# Patient Record
Sex: FEMALE | Race: BLACK OR AFRICAN AMERICAN | Employment: FULL TIME | URBAN - METROPOLITAN AREA
[De-identification: names, ages, dates, MRNs, and addresses within clinical notes are randomized per-mention and may not be internally consistent; named-entity substitution may affect disease eponyms.]

---

## 2017-01-23 ENCOUNTER — HOSPITAL ENCOUNTER (OUTPATIENT)
Dept: LAB | Age: 27
Discharge: HOME | End: 2017-01-23

## 2017-01-23 DIAGNOSIS — C56.2 FEMALE YOLK SAC TUMOR, LEFT (HCC): ICD-10-CM

## 2017-01-23 PROCEDURE — 99001 SPECIMEN HANDLING PT-LAB: CPT | Performed by: OBSTETRICS & GYNECOLOGY

## 2017-01-26 ENCOUNTER — TELEPHONE (OUTPATIENT)
Dept: ONCOLOGY | Age: 27
End: 2017-01-26

## 2017-01-26 DIAGNOSIS — C56.2 FEMALE YOLK SAC TUMOR, LEFT (HCC): Primary | ICD-10-CM

## 2017-02-09 ENCOUNTER — HOSPITAL ENCOUNTER (OUTPATIENT)
Dept: LAB | Age: 27
Discharge: HOME | End: 2017-02-09

## 2017-02-09 ENCOUNTER — TELEPHONE (OUTPATIENT)
Dept: ONCOLOGY | Age: 27
End: 2017-02-09

## 2017-02-09 DIAGNOSIS — C56.2 FEMALE YOLK SAC TUMOR, LEFT (HCC): ICD-10-CM

## 2017-02-09 PROCEDURE — 99001 SPECIMEN HANDLING PT-LAB: CPT | Performed by: NURSE PRACTITIONER

## 2017-02-09 NOTE — TELEPHONE ENCOUNTER
Pt agreeable to having labs redrawn. Orders in [address] [place] [address]. Will f/u via phone to review results.

## 2017-02-10 LAB
AFP-TM SERPL-MCNC: 4.2 NG/ML (ref 0–8.3)
CANCER AG125 SERPL-ACNC: 6.8 U/ML (ref 0–38.1)

## 2017-02-23 ENCOUNTER — TELEPHONE (OUTPATIENT)
Dept: ONCOLOGY | Age: 27
End: 2017-02-23

## 2017-04-25 ENCOUNTER — TELEPHONE (OUTPATIENT)
Dept: ONCOLOGY | Age: 27
End: 2017-04-25

## 2017-04-25 ENCOUNTER — HOSPITAL ENCOUNTER (OUTPATIENT)
Dept: LAB | Age: 27
Discharge: HOME | End: 2017-04-25

## 2017-04-25 DIAGNOSIS — C56.2 OVARIAN CANCER ON LEFT (HCC): ICD-10-CM

## 2017-04-25 DIAGNOSIS — C56.2 FEMALE YOLK SAC TUMOR, LEFT (HCC): ICD-10-CM

## 2017-04-25 DIAGNOSIS — C56.2 MALIGNANT GERM CELL TUMOR OF LEFT OVARY (HCC): Primary | ICD-10-CM

## 2017-04-25 DIAGNOSIS — C56.2: ICD-10-CM

## 2017-04-25 PROCEDURE — 99001 SPECIMEN HANDLING PT-LAB: CPT | Performed by: OBSTETRICS & GYNECOLOGY

## 2017-04-26 LAB
AFP-TM SERPL-MCNC: 3.8 NG/ML (ref 0–8.3)
CANCER AG125 SERPL-ACNC: 7.1 U/ML (ref 0–38.1)

## 2017-04-28 ENCOUNTER — OFFICE VISIT (OUTPATIENT)
Dept: ONCOLOGY | Age: 27
End: 2017-04-28

## 2017-04-28 VITALS
WEIGHT: 215 LBS | BODY MASS INDEX: 39.32 KG/M2 | OXYGEN SATURATION: 100 % | SYSTOLIC BLOOD PRESSURE: 124 MMHG | DIASTOLIC BLOOD PRESSURE: 70 MMHG | RESPIRATION RATE: 16 BRPM | HEART RATE: 82 BPM | TEMPERATURE: 97.9 F

## 2017-04-28 DIAGNOSIS — T45.1X5A NEUROPATHY DUE TO CHEMOTHERAPEUTIC DRUG (HCC): ICD-10-CM

## 2017-04-28 DIAGNOSIS — C56.2 OVARIAN CANCER ON LEFT (HCC): Primary | ICD-10-CM

## 2017-04-28 DIAGNOSIS — G62.0 NEUROPATHY DUE TO CHEMOTHERAPEUTIC DRUG (HCC): ICD-10-CM

## 2017-04-28 DIAGNOSIS — C56.2 MALIGNANT GERM CELL TUMOR OF LEFT OVARY (HCC): ICD-10-CM

## 2017-04-28 DIAGNOSIS — N95.1 MENOPAUSAL SYMPTOMS: ICD-10-CM

## 2017-04-28 DIAGNOSIS — C56.2: ICD-10-CM

## 2017-04-28 DIAGNOSIS — C56.2 FEMALE YOLK SAC TUMOR, LEFT (HCC): ICD-10-CM

## 2017-04-28 RX ORDER — GABAPENTIN 300 MG/1
300 CAPSULE ORAL 3 TIMES DAILY
COMMUNITY

## 2017-04-28 NOTE — PROGRESS NOTES
Pt here ambulatory for follow up with Dr Marycarmen Arellano. Completed chemo 12-19-17. Pt denies any vaginal discharge. Pt does have residual neuropathy in her hands and feet \"itching and tingling off and on\". Pt is taking neurontin \"it says 3 times a day, but I take it only once a day\". Advise her to discuss with her PCP. Also advised to take the B-6 and B12 vitamins. PT agreed to this. Wrote down dosage for these. Education provided and pt to follow up as scheduled.

## 2017-04-28 NOTE — PROGRESS NOTES
[facility]  [facility], [address], [address] [id number] [phone]  Yasmany Dumas MD    Patient ID:  Drake Polo  [id number]   y.o. 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 Diagnosed initially 10/24/2013 with a recurrence in 2014. Pt completed Bleomycin/Etoposide/Cisplatin for recurrence 14. Her previous procedures include Oophorectomy, right, and D&C 10/24/2014. Patient is also status post Oophorectomy, left, Diagnostic Lap, Exploratory Lap, Resection of Right Pelvic Mass, Lysis of adhesions and Cystoscopy 2014. She is here today for a 3 month f/u. Patient denies any Gyn symptoms. Patient specifically denies any abnormal vaginal bleeding, vaginal discharge, or vaginal irritation. Patient also denies abdominal pain, pelvic pain, or abdominal bloating. Patient is voiding without difficulty and bowel movements are regular. Patient reports residual neuropathy as detailed in [facility] note and reviewed by me. US TRANSVAGINAL: 5/15/15    IMPRESSION:  The uterus is within normal limits.  Both ovaries have been removed    Labs:  Component       AFP, Serum, Tumor Marker   Latest Ref Rng & Units       0.0 - 8.3 ng/mL   2017      12:00 AM 3.8   2017      12:00 AM 4.2   10/11/2016      7:35 AM 4.6   2016      8:30 AM 3.9   6/3/2016      7:55 AM 3.9   2016      7:44 AM 4.1   2016      9:00 AM 3.6   3/15/2016      8:07 AM 4.2   2016      9:58 AM 3.2   2016      8:09 AM 4.1   2015      10:23 AM 5.6   2015      8:25 AM 4.7   10/12/2015      11:42 AM 4.0   2015      12:00 AM 4.3   2015      9:06 AM 4.7   2015      2:03 PM 4.0   2015      10:50 AM 4.2   2015      10:09 AM 4.1   2015      4:22 PM 4.2   3/5/2015      12:00 AM 4.1   2015      12:00 AM 6.0   1/5/2015      11:15 AM 6.3     12/4/2014      12:10 PM 8.8 (H)   10/31/2014      11:30 AM 54.6 (H)   10/10/2014      9:55 .5 (H)   9/8/2014      12:00 AM 95302.0 (H)   5/29/2014      12:00 AM 6693.0 (H)     Component       Cancer Ag (CA) 125   Latest Ref Rng & Units       0.0 - 38.1 U/mL   2/9/2017      12:00 AM 6.8   10/11/2016       7:35 AM 8.1   7/6/2016       8:30 AM 9.3   6/3/2016       7:55 AM 8.4   5/11/2016       7:44 AM 7.6   4/14/2016       9:00 AM 8.1   3/15/2016       8:07 AM 8.1   2/17/2016       9:58 AM 6.5   1/8/2016       8:09 AM 7.6   12/2/2015      10:23 AM 9.0   11/12/2015       8:25 AM 9.8   10/12/2015      11:42 AM 9.0   9/18/2015      12:00 AM 8.2   8/4/2015       9:06 AM 9.1   7/7/2015       2:03 PM 10.2   6/5/2015      10:50 AM 8.7   5/12/2015      10:09 AM 8.8   4/6/2015       4:22 PM 8.3   3/5/2015      12:00 AM 8.1   1/5/2015      11:15 AM 12.8   12/12/2014      10:15 AM 13.5   11/21/2014       8:40 AM 14.2   10/31/2014      11:30 AM 11.2   9/26/2014       1:30 PM 33.6   5/29/2014      12:00 AM 23.8     Ca 125 11/4/2013: = 82.7       COMPREHENSIVE ROS:   Scanned in media and reviewed by me. Detailed positives and pertinent negatives in HPI      Past Medical History:   Diagnosis Date    Asthma     well controlled    Cancer ([address]     left ovary h/o    Coagulation disorder ([address]     Dyspepsia and other specified disorders of function of stomach     Hypokalemia 9/8/2014    Morbid obesity ([address]     [facility]     PIH (pregnancy induced hypertension)        Past Surgical History:   Procedure Laterality Date    HX ADENOIDECTOMY Bilateral 10/2005    HX OOPHORECTOMY      left    HX POLYPECTOMY  1993    rectal       Social History     Social History    Marital status:      Spouse name: N/A    Number of children: N/A    Years of education: N/A     Occupational History    Not on file.      Social History Main Topics    Smoking status: Never Smoker    Smokeless tobacco: Never Used    Alcohol use 0.0 oz/week      Comment: 1-2 mixed drinks monthly    Drug use: No    Sexual activity: Yes     Partners: Male     Other Topics Concern    Not on file     Social History Narrative       Family History   Problem Relation Age of Onset    Cancer Father     Cancer Paternal Uncle        Current Outpatient Prescriptions on File Prior to Visit   Medication Sig Dispense Refill    norethindrone-ethinyl estradiol-iron (WANDA FE 1.5/30, 28,) 1.5 mg-30 mcg (21)/75 mg (7) tab Take 1 Tab by mouth daily. Do not take last 7 pills of each skip. Discard these and start new pack. 112 Tab 3    loratadine (CLARITIN) 10 mg tablet Take 10 mg by mouth daily.  albuterol (PROAIR HFA) 90 mcg/actuation inhaler Take  by inhalation.  LORazepam (ATIVAN) 0.5 mg tablet Take 1 Tab by mouth every twelve (12) hours as needed for Anxiety. Max Daily Amount: 1 mg. 60 Tab 0    ibuprofen (MOTRIN) 800 mg tablet Take 1 tablet by mouth every six (6) hours as needed for Pain. 90 tablet 1     No current facility-administered medications on file prior to visit.         Allergies   Allergen Reactions    Penicillins Hives       OBJECTIVE:  PHYSICAL EXAM  VITAL SIGNS: Visit Vitals    /70 (BP 1 Location: Left arm, BP Patient Position: Sitting)    Pulse 82    Temp 97.9 °F (36.6 °C) (Oral)    Resp 16    Wt 97.5 kg (215 lb)    SpO2 100%    BMI 39.32 kg/m2      GENERAL ANDREW: Well nourished, well developed, no acute distress   HEENT: NCAT, EOMI, PERRL    RESPIRATORY: Lungs CTA bilaterally, no wheezing, rhonchi, or crackles    CARDIOVASC: RRR, S1 and S2 present, no murmurs, rubs, or gallop    GASTROINT: Soft, nontender, nondistended, NABS, no masses    MUSCULOSKEL: no joint tenderness, deformity or swelling    INTEGUMENT: no rash or abnormalities    EXTREMITIES: extremities normal, atraumatic, no cyanosis or edema    PELVIC: VULVA: normal appearing vulva with no masses, tenderness or lesions  VAGINA: normal appearing vagina with hyperemia and white clumpy discharge   CERVIX: normal appearing cervix, thin prep pap collected today UTERUS: uterus is normal size, shape, consistency and nontender  ADNEXA: surgically absent bilateral.   RECTAL: deferred   KATIA SURVEY: Cervical, supraclavicular, and axillary nodes normal   NEURO: Grossly normal       IMPRESSION AND PLAN:    Recurrent Yolk Sac Tumor of the Ovary   Completed chemotherapy with BEP x 4 cycles with complete clinical response  Neuropathy, chemo induced, improved. Encouraged continued B6 and B12 use. Encourage patient to take Neurontin as prescribed or discuss with PCP. GERD, improved, continue Protonix  Microgestin for menopausal symptoms. Taking continuous. Instructed patient to discard pill pack after completed first 21 pills and start new pack. Reinforced taking pill at the same time daily to avoid breakthrough bleeding. AFP and CA-125 ordered every 3 months. Follow up for pelvic exam for surveillance in 6 months. Will be due for pap 7/2017.   Patient verbalizes understanding of information provided today and agrees with plan of care      Jj Doan MD  Gynecologic Oncology  4/28/2017/10:30 AM

## 2017-04-28 NOTE — MR AVS SNAPSHOT
Visit Information Date & Time Provider Department Dept. Phone Encounter #  
 4/28/2017 10:30 AM MD Edy Manz[facility] [phone] [id number] Your Appointments 10/30/2017  9:45 AM  
Office Visit with MD Edy Manzano [facility] [address] [facility]) Appt Note: 6 MO F/U  
 [id number] [address] Upcoming Health Maintenance Date Due Pneumococcal 19-64 Highest Risk (1 of 3 - PCV13) 9/7/2008 DTaP/Tdap/Td series (1 - Tdap) 9/7/2010 INFLUENZA AGE 9 TO ADULT 8/1/2016 PAP AKA CERVICAL CYTOLOGY 7/19/2019 Allergies as of 4/28/2017  Review Complete On: 4/28/2017 By: [address], MD  
  
 Severity Noted Reaction Type Reactions Penicillins  10/23/2013   Topical Hives Current Immunizations  Reviewed on 12/29/2014 No immunizations on file. Not reviewed this visit You Were Diagnosed With   
  
 Codes Comments Ovarian cancer on left 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 
ICD-9-CM: 357.6, E933.1 Vitals BP Pulse Temp Resp Weight(growth percentile) SpO2  
 124/70 (BP 1 Location: Left arm, BP Patient Position: Sitting) 82 97.9 °F (36.6 °C) (Oral) 16 215 lb (97.5 kg) 100% BMI OB Status Smoking Status 39.32 kg/m2 Having regular periods Never Smoker BMI and BSA Data  Body Mass Index Body Surface Area  
 39.32 kg/m 2 2.07 m 2  
  
  
 Preferred Pharmacy Pharmacy Name Phone [id number]on [phone] Your Updated Medication List  
  
   
This list is accurate as of: 4/28/17 11:46 AM.  Always use your most recent med list.  
  
  
  
  
 CLARITIN 10 mg tablet Generic drug:  loratadine Take 10 mg by mouth daily. gabapentin 300 mg capsule Commonly known as:  NEURONTIN Take 300 mg by mouth daily. ibuprofen 800 mg tablet Commonly known as:  MOTRIN Take 1 tablet by mouth every six (6) hours as needed for Pain. LORazepam 0.5 mg tablet Commonly known as:  ATIVAN Take 1 Tab by mouth every twelve (12) hours as needed for Anxiety. Max Daily Amount: 1 mg. norethindrone-ethinyl estradiol-iron 1.5 mg-30 mcg (21)/75 mg (7) Tab Commonly known as:  WANDA FE 1.5/30 (28) Take 1 Tab by mouth daily. Do not take last 7 pills of each skip. Discard these and start new pack. PROAIR HFA 90 mcg/actuation inhaler Generic drug:  albuterol Take  by inhalation. Introducing [facility]! Dear Wes Phillips: Thank you for requesting a [employer] account. Our records indicate that you already have an active [employer] account. You can access your account anytime at [email or web address]. [employer] Did you know that you can access your hospital and ER discharge instructions at any time in [employer]? You can also review all of your test results from your hospital stay or ER visit. Additional Information If you have questions, please visit the Frequently Asked Questions section of the [employer] website at [email or web address]. [employer]/. Remember, [employer] is NOT to be used for urgent needs. For medical emergencies, dial 911. Now available from your iPhone and Android! Please provide this summary of care documentation to your next provider. Your primary care clinician is listed as Nathalie Arndt.  If you have any questions after today's visit, please call [phone].

## 2017-07-11 ENCOUNTER — HOSPITAL ENCOUNTER (OUTPATIENT)
Dept: LAB | Age: 27
Discharge: HOME | End: 2017-07-11

## 2017-07-11 PROCEDURE — 99001 SPECIMEN HANDLING PT-LAB: CPT | Performed by: OBSTETRICS & GYNECOLOGY

## 2017-07-12 LAB
AFP-TM SERPL-MCNC: 5 NG/ML (ref 0–8.3)
CANCER AG125 SERPL-ACNC: 6.8 U/ML (ref 0–38.1)

## 2017-07-25 ENCOUNTER — TELEPHONE (OUTPATIENT)
Dept: ONCOLOGY | Age: 27
End: 2017-07-25

## 2017-07-25 RX ORDER — FLUCONAZOLE 150 MG/1
150 TABLET ORAL DAILY
Qty: 1 TAB | Refills: 0 | Status: SHIPPED | OUTPATIENT
Start: 2017-07-25 | End: 2017-07-26

## 2017-07-27 DIAGNOSIS — C56.2 MALIGNANT GERM CELL TUMOR OF LEFT OVARY (HCC): ICD-10-CM

## 2017-07-27 DIAGNOSIS — C56.2: ICD-10-CM

## 2017-07-27 DIAGNOSIS — C56.2 OVARIAN CANCER ON LEFT (HCC): ICD-10-CM

## 2017-07-27 DIAGNOSIS — C56.2 FEMALE YOLK SAC TUMOR, LEFT (HCC): ICD-10-CM

## 2017-10-18 ENCOUNTER — TELEPHONE (OUTPATIENT)
Dept: ONCOLOGY | Age: 27
End: 2017-10-18

## 2017-10-26 ENCOUNTER — TELEPHONE (OUTPATIENT)
Dept: ONCOLOGY | Age: 27
End: 2017-10-26

## 2017-10-26 ENCOUNTER — HOSPITAL ENCOUNTER (OUTPATIENT)
Dept: LAB | Age: 27
Discharge: HOME | End: 2017-10-26

## 2017-10-26 PROCEDURE — 99001 SPECIMEN HANDLING PT-LAB: CPT | Performed by: OBSTETRICS & GYNECOLOGY

## 2017-10-26 RX ORDER — NORETHINDRONE ACETATE AND ETHINYL ESTRADIOL 1.5-30(21)
1 KIT ORAL DAILY
Qty: 60 TAB | Refills: 0 | Status: SHIPPED | OUTPATIENT
Start: 2017-10-26 | End: 2017-12-17 | Stop reason: SDUPTHER

## 2017-10-26 NOTE — TELEPHONE ENCOUNTER
Patient returned my call to reschedule her appointment since Dr. Yanique Silva is no longer with the practice. While rescheduling the patient mentioned that she is almost out of birth control and is requesting a refill, she will run out prior to her appointment. Please call her at [phone].

## 2017-10-27 LAB
AFP-TM SERPL-MCNC: 4.3 NG/ML (ref 0–8.3)
CANCER AG125 SERPL-ACNC: 7 U/ML (ref 0–38.1)

## 2017-11-07 ENCOUNTER — OFFICE VISIT (OUTPATIENT)
Dept: ONCOLOGY | Age: 27
End: 2017-11-07

## 2017-11-07 VITALS
WEIGHT: 219 LBS | OXYGEN SATURATION: 100 % | HEIGHT: 62 IN | BODY MASS INDEX: 40.3 KG/M2 | TEMPERATURE: 98.7 F | HEART RATE: 82 BPM | SYSTOLIC BLOOD PRESSURE: 131 MMHG | DIASTOLIC BLOOD PRESSURE: 99 MMHG

## 2017-11-07 DIAGNOSIS — Z12.4 SCREENING FOR MALIGNANT NEOPLASM OF CERVIX: ICD-10-CM

## 2017-11-07 DIAGNOSIS — C56.2 MALIGNANT GERM CELL TUMOR OF LEFT OVARY (HCC): Primary | ICD-10-CM

## 2017-11-07 DIAGNOSIS — C56.9 MALIGNANT NEOPLASM OF OVARY, UNSPECIFIED LATERALITY (HCC): ICD-10-CM

## 2017-11-07 DIAGNOSIS — C56.2: ICD-10-CM

## 2017-11-07 DIAGNOSIS — T45.1X5A NEUROPATHY DUE TO CHEMOTHERAPEUTIC DRUG (HCC): ICD-10-CM

## 2017-11-07 DIAGNOSIS — C56.2 OVARIAN CANCER ON LEFT (HCC): ICD-10-CM

## 2017-11-07 DIAGNOSIS — G62.0 NEUROPATHY DUE TO CHEMOTHERAPEUTIC DRUG (HCC): ICD-10-CM

## 2017-11-07 DIAGNOSIS — N95.1 MENOPAUSAL SYMPTOMS: ICD-10-CM

## 2017-11-07 RX ORDER — GENTAMICIN SULFATE 3 MG/ML
1 SOLUTION/ DROPS OPHTHALMIC
COMMUNITY
Start: 2017-11-02 | End: 2017-11-12

## 2017-11-07 RX ORDER — CITALOPRAM 20 MG/1
20 TABLET, FILM COATED ORAL
COMMUNITY
Start: 2017-10-19 | End: 2017-11-18

## 2017-11-07 RX ORDER — PHENTERMINE HYDROCHLORIDE 37.5 MG/1
37.5 TABLET ORAL
COMMUNITY
Start: 2017-07-25 | End: 2020-09-10 | Stop reason: SDUPTHER

## 2017-11-07 RX ORDER — FLUTICASONE PROPIONATE 50 MCG
2 SPRAY, SUSPENSION (ML) NASAL
COMMUNITY
Start: 2017-07-25 | End: 2018-07-25

## 2017-11-07 NOTE — PROGRESS NOTES
[facility]  [address] [place] [address], [address], [address] [phone]  [facility]    Patient ID:  Negro Franz  [id number]   y.o. 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 Diagnosed initially 10/24/2013 with a recurrence in 2014. Pt completed Bleomycin/Etoposide/Cisplatin for recurrence 14. Her previous procedures include Oophorectomy, right, and D&C 10/24/2014. Patient is also status post Oophorectomy, left, Diagnostic Lap, Exploratory Lap, Resection of Right Pelvic Mass, Lysis of adhesions and Cystoscopy 2014. She is here today for a 6 month f/u. Patient denies any Gyn symptoms. Patient specifically denies any abnormal vaginal bleeding, vaginal discharge, or vaginal irritation. Patient also denies abdominal pain, pelvic pain, or abdominal bloating. Patient is voiding without difficulty and bowel movements are regular. Patient reports residual neuropathy, taking Neurontin TID. Taking OCP continuously with relief of menopausal symptoms, denies ACHES. US TRANSVAGINAL: 5/15/15    IMPRESSION:  The uterus is within normal limits.  Both ovaries have been removed    Labs:  Component       Cancer Ag (CA) 125   Latest Ref Rng & Units       0.0 - 38.1 U/mL   10/26/2017      9:45 AM 7.0   2017      12:00 AM 6.8   2017      12:00 AM 6.8   10/11/2016      7:35 AM 8.1   2016      8:30 AM 9.3   6/3/2016      7:55 AM 8.4   2016      7:44 AM 7.6   2016      9:00 AM 8.1   3/15/2016      8:07 AM 8.1   2016      9:58 AM 6.5   2016      8:09 AM 7.6   2015      10:23 AM 9.0   2015      8:25 AM 9.8   10/12/2015      11:42 AM 9.0   2015      12:00 AM 8.2   2015      9:06 AM 9.1   2015      2:03 PM 10.2   2015      10:50 AM 8.7   2015      10:09 AM 8.8 4/6/2015      4:22 PM 8.3   3/5/2015      12:00 AM 8.1   1/5/2015      11:15 AM 12.8   12/12/2014      10:15 AM 13.5   11/21/2014      8:40 AM 14.2   10/31/2014      11:30 AM 11.2   9/26/2014      1:30 PM 33.6   5/7/2014      12:00 AM 11.5   1/20/2014      12:00 AM 12.6   12/30/2013      10:15 AM 11.8   11/25/2013      12:00 AM 46.0 (H)       Component       AFP, Serum, Tumor Marker   Latest Ref Rng & Units       0.0 - 8.3 ng/mL   10/26/2017       9:45 AM 4.3   7/11/2017      12:00 AM 5.0   4/25/2017      12:00 AM 3.8   2/9/2017      12:00 AM 4.2   10/11/2016       7:35 AM 4.6   7/6/2016       8:30 AM 3.9   6/3/2016       7:55 AM 3.9   5/11/2016       7:44 AM 4.1   4/14/2016       9:00 AM 3.6   3/15/2016       8:07 AM 4.2   2/17/2016       9:58 AM 3.2   1/8/2016       8:09 AM 4.1   12/2/2015      10:23 AM 5.6   11/12/2015       8:25 AM 4.7   10/12/2015      11:42 AM 4.0   9/18/2015      12:00 AM 4.3   8/4/2015       9:06 AM 4.7   7/7/2015       2:03 PM 4.0   6/5/2015      10:50 AM 4.2   5/12/2015      10:09 AM 4.1   4/6/2015       4:22 PM 4.2   3/5/2015      12:00 AM 4.1   1/23/2015      12:00 AM 6.0   1/5/2015      11:15 AM 6.3   12/4/2014      12:10 PM 8.8 (H)   10/31/2014      11:30 AM 54.6 (H)   10/10/2014       9:55 .5 (H)   9/8/2014      12:00 AM 74344.0 (H)         COMPREHENSIVE ROS:   Detailed positives and pertinent negatives in HPI      Past Medical History:   Diagnosis Date    Asthma     well controlled    Cancer [facility]     left ovary h/o    Coagulation disorder ([facility]     Dyspepsia and other specified disorders of function of stomach     Hypokalemia 9/8/2014    Morbid obesity ([facility]     Ovarian cancer ([facility]     PIH (pregnancy induced hypertension)     Pink eye disease, left        Past Surgical History:   Procedure Laterality Date    HX ADENOIDECTOMY Bilateral 10/2005    HX OOPHORECTOMY      left    HX POLYPECTOMY  1993    rectal       Social History     Social History    Marital status:      Spouse name: N/A    Number of children: N/A    Years of education: N/A     Occupational History    Not on file. Social History Main Topics    Smoking status: Never Smoker    Smokeless tobacco: Never Used    Alcohol use 0.0 oz/week      Comment: 1-2 mixed drinks monthly    Drug use: No    Sexual activity: Yes     Partners: Male     Birth control/ protection: Pill     Other Topics Concern    Not on file     Social History Narrative       Family History   Problem Relation Age of Onset    Cancer Father     Cancer Paternal Uncle        Current Outpatient Prescriptions on File Prior to Visit   Medication Sig Dispense Refill    norethindrone-ethinyl estradiol-iron (WANDA FE 1.5/30, 28,) 1.5 mg-30 mcg (21)/75 mg (7) tab Take 1 Tab by mouth daily. Do not take last 7 pills of each skip. Discard these and start new pack. 60 Tab 0    gabapentin (NEURONTIN) 300 mg capsule Take 300 mg by mouth daily.  loratadine (CLARITIN) 10 mg tablet Take 10 mg by mouth daily.  albuterol (PROAIR HFA) 90 mcg/actuation inhaler Take  by inhalation.  LORazepam (ATIVAN) 0.5 mg tablet Take 1 Tab by mouth every twelve (12) hours as needed for Anxiety. Max Daily Amount: 1 mg. 60 Tab 0    ibuprofen (MOTRIN) 800 mg tablet Take 1 tablet by mouth every six (6) hours as needed for Pain. 90 tablet 1     No current facility-administered medications on file prior to visit.         Allergies   Allergen Reactions    Penicillins Hives       OBJECTIVE:  PHYSICAL EXAM  VITAL SIGNS: Visit Vitals    BP (!) 131/99 (BP 1 Location: Right arm, BP Patient Position: Sitting)    Pulse 82    Temp 98.7 °F (37.1 °C) (Oral)    Ht 5' 2\" (1.575 m)    Wt 99.3 kg (219 lb)    LMP 10/16/2017 (Within Weeks)    SpO2 100%    BMI 40.06 kg/m2      GENERAL ANDREW: Well nourished, well developed, no acute distress   HEENT: NCAT, EOMI, PERRL    RESPIRATORY: Lungs CTA bilaterally, no wheezing, rhonchi, or crackles    CARDIOVASC: RRR, S1 and S2 present, no murmurs, rubs, or gallop    GASTROINT: Soft, nontender, nondistended, NABS, no masses    MUSCULOSKEL: no joint tenderness, deformity or swelling    INTEGUMENT: no rash or abnormalities    EXTREMITIES: extremities normal, atraumatic, no cyanosis or edema    PELVIC: VULVA: normal appearing vulva with no masses, tenderness or lesions  VAGINA: normal appearing vagina, atrophic  CERVIX: normal appearing cervix, thin prep pap collected today UTERUS: uterus is normal size, shape, consistency and nontender  ADNEXA: surgically absent bilateral.   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 Neuropathy, chemo induced, improved. Encouraged B6 and B12 use. Encourage patient to take Neurontin as prescribed or discuss with PCP. GERD, improved, continue Protonix  Microgestin for menopausal symptoms. Taking continuous. Instructed patient to discard pill pack after completed first 21 pills and start new pack. Reinforced taking pill at the same time daily to avoid breakthrough bleeding. AFP and CA-125 ordered every 3 months. Follow up for surveillance in 6 months. Patient verbalizes understanding of information provided today and agrees with plan of care    Total time spent was 25 minutes regarding diagnosis of ovarian cancer and >50% of this time was spent counseling and coordinating care.     [address] [place] [address]  Gynecologic Oncology  11/7/2017/10:30 AM

## 2017-11-07 NOTE — PROGRESS NOTES
Tyshawn July, a 28 y.o. female,  is here for   Chief Complaint   Patient presents with    Ovarian Cancer     surveillance       Visit Vitals    BP (!) 131/99 (BP 1 Location: Right arm, BP Patient Position: Sitting)    Pulse 82    Temp 98.7 °F (37.1 °C) (Oral)    Ht 5' 2\" (1.575 m)    Wt 99.3 kg (219 lb)    SpO2 100%    BMI 40.06 kg/m2       Patient denies any abdominal or pelvic pain. No unusual bleeding, discharge, or irritation. No changes in bladder or bowel habits. 1. Have you been to the ER, urgent care clinic since your last visit? Hospitalized since your last visit? Yes When: November 1, 2017 Where: [facility] Reason for visit:  pink eye    2. Have you seen or consulted any other health care providers outside of the [address] [place] [address] since your last visit? Include any pap smears or colon screening.  No

## 2017-11-13 LAB
CYTOLOGIST CVX/VAG CYTO: NORMAL
CYTOLOGY CVX/VAG DOC THIN PREP: NORMAL
DX ICD CODE: NORMAL
HPV I/H RISK 1 DNA CVX QL PROBE+SIG AMP: NEGATIVE
Lab: NORMAL
OTHER STN SPEC: NORMAL
PATH REPORT.FINAL DX SPEC: NORMAL
STAT OF ADQ CVX/VAG CYTO-IMP: NORMAL

## 2017-12-18 RX ORDER — FLUCONAZOLE 150 MG/1
150 TABLET ORAL DAILY
Qty: 1 TAB | Refills: 0 | Status: SHIPPED | OUTPATIENT
Start: 2017-12-18 | End: 2017-12-19

## 2017-12-18 RX ORDER — NORETHINDRONE ACETATE/ETHINYL ESTRADIOL AND FERROUS FUMARATE 1.5-30(21)
KIT ORAL
Qty: 56 TAB | Refills: 0 | Status: SHIPPED | OUTPATIENT
Start: 2017-12-18 | End: 2018-01-29 | Stop reason: SDUPTHER

## 2017-12-18 NOTE — TELEPHONE ENCOUNTER
Patient called requesting birth control refill to be put in today stating she needs to take her next pill today. If you have any questions please call her at [phone].

## 2018-01-26 DIAGNOSIS — C56.2 OVARIAN CANCER ON LEFT (HCC): ICD-10-CM

## 2018-01-26 DIAGNOSIS — C56.2 MALIGNANT GERM CELL TUMOR OF LEFT OVARY (HCC): ICD-10-CM

## 2018-01-26 DIAGNOSIS — C56.9 MALIGNANT NEOPLASM OF OVARY, UNSPECIFIED LATERALITY (HCC): ICD-10-CM

## 2018-01-26 DIAGNOSIS — C56.2: ICD-10-CM

## 2018-01-30 RX ORDER — NORETHINDRONE ACETATE/ETHINYL ESTRADIOL AND FERROUS FUMARATE 1.5-30(21)
KIT ORAL
Qty: 56 TAB | Refills: 0 | Status: SHIPPED | OUTPATIENT
Start: 2018-01-30 | End: 2018-04-16 | Stop reason: SDUPTHER

## 2018-02-12 ENCOUNTER — TELEPHONE (OUTPATIENT)
Dept: ONCOLOGY | Age: 28
End: 2018-02-12

## 2018-02-12 NOTE — TELEPHONE ENCOUNTER
Called to inform patient that her AFP and CA-125 are due to be completed. Patient expressed understanding and had no further questions or concerns. Encouraged to call back if she develops any.

## 2018-02-27 ENCOUNTER — HOSPITAL ENCOUNTER (OUTPATIENT)
Dept: LAB | Age: 28
Discharge: HOME | End: 2018-02-27

## 2018-02-27 PROCEDURE — 99001 SPECIMEN HANDLING PT-LAB: CPT | Performed by: NURSE PRACTITIONER

## 2018-03-01 ENCOUNTER — TELEPHONE (OUTPATIENT)
Dept: ONCOLOGY | Age: 28
End: 2018-03-01

## 2018-03-01 LAB
AFP-TM SERPL-MCNC: 4.3 NG/ML (ref 0–8.3)
CANCER AG125 SERPL-ACNC: 6.2 U/ML (ref 0–38.1)

## 2018-03-01 NOTE — TELEPHONE ENCOUNTER
LMOR nl labs, pt to call for specifics if desired, otherwise f/u as scheduled in May for routine surveillance.

## 2018-04-16 ENCOUNTER — TELEPHONE (OUTPATIENT)
Dept: ONCOLOGY | Age: 28
End: 2018-04-16

## 2018-04-16 RX ORDER — NORETHINDRONE ACETATE AND ETHINYL ESTRADIOL 1.5-30(21)
KIT ORAL
Qty: 56 TAB | Refills: 3 | Status: SHIPPED | OUTPATIENT
Start: 2018-04-16 | End: 2018-10-01 | Stop reason: SDUPTHER

## 2018-04-16 NOTE — TELEPHONE ENCOUNTER
Ms. Gela Carranza called the office requesting a birth control refill. Please call her at [phone] for any clarification.

## 2018-04-17 ENCOUNTER — TELEPHONE (OUTPATIENT)
Dept: ONCOLOGY | Age: 28
End: 2018-04-17

## 2018-04-17 RX ORDER — FLUCONAZOLE 150 MG/1
150 TABLET ORAL DAILY
Qty: 1 TAB | Refills: 0 | Status: SHIPPED | OUTPATIENT
Start: 2018-04-17 | End: 2018-04-18

## 2018-04-17 NOTE — TELEPHONE ENCOUNTER
Returned call to patient, will prescribe Diflucan one dose, if sx's persist will have patient f/u in office to further evaluate. Otherwise pt to f/u in May for routine surveillance. Pt agreeable.

## 2018-04-17 NOTE — TELEPHONE ENCOUNTER
Patient called and requested that yeast infection medication also be sent to her pharmacy. She requests a call back at [phone].

## 2018-04-26 DIAGNOSIS — C56.2 OVARIAN CANCER ON LEFT (HCC): ICD-10-CM

## 2018-04-26 DIAGNOSIS — C56.2: ICD-10-CM

## 2018-04-26 DIAGNOSIS — C56.2 MALIGNANT GERM CELL TUMOR OF LEFT OVARY (HCC): ICD-10-CM

## 2018-04-26 DIAGNOSIS — C56.9 MALIGNANT NEOPLASM OF OVARY, UNSPECIFIED LATERALITY (HCC): ICD-10-CM

## 2018-05-04 ENCOUNTER — HOSPITAL ENCOUNTER (OUTPATIENT)
Dept: LAB | Age: 28
Discharge: HOME | End: 2018-05-04

## 2018-05-04 PROCEDURE — 99001 SPECIMEN HANDLING PT-LAB: CPT | Performed by: OBSTETRICS & GYNECOLOGY

## 2018-05-05 LAB
AFP-TM SERPL-MCNC: 4.2 NG/ML (ref 0–8.3)
CANCER AG125 SERPL-ACNC: 6.5 U/ML (ref 0–38.1)

## 2018-05-07 ENCOUNTER — OFFICE VISIT (OUTPATIENT)
Dept: ONCOLOGY | Age: 28
End: 2018-05-07

## 2018-05-07 VITALS
BODY MASS INDEX: 40.74 KG/M2 | TEMPERATURE: 98.2 F | SYSTOLIC BLOOD PRESSURE: 132 MMHG | HEART RATE: 92 BPM | HEIGHT: 62 IN | DIASTOLIC BLOOD PRESSURE: 90 MMHG | RESPIRATION RATE: 18 BRPM | OXYGEN SATURATION: 98 % | WEIGHT: 221.4 LBS

## 2018-05-07 DIAGNOSIS — C56.9 MALIGNANT NEOPLASM OF OVARY, UNSPECIFIED LATERALITY (HCC): Primary | ICD-10-CM

## 2018-05-07 DIAGNOSIS — C56.2 MALIGNANT GERM CELL TUMOR OF LEFT OVARY (HCC): ICD-10-CM

## 2018-05-07 PROBLEM — E66.01 OBESITY, MORBID (HCC): Status: ACTIVE | Noted: 2018-05-07

## 2018-05-07 RX ORDER — CITALOPRAM 40 MG/1
40 TABLET, FILM COATED ORAL DAILY
Qty: 30 TAB | Refills: 6 | Status: SHIPPED | OUTPATIENT
Start: 2018-05-07 | End: 2018-12-06 | Stop reason: ALTCHOICE

## 2018-05-07 RX ORDER — VENLAFAXINE 37.5 MG/1
37.5 TABLET ORAL DAILY
Qty: 30 TAB | Refills: 6 | Status: SHIPPED | OUTPATIENT
Start: 2018-05-07 | End: 2018-05-07 | Stop reason: ALTCHOICE

## 2018-05-07 RX ORDER — CITALOPRAM 10 MG/1
20 TABLET ORAL DAILY
COMMUNITY
End: 2018-05-07 | Stop reason: DRUGHIGH

## 2018-05-07 NOTE — PROGRESS NOTES
[facility]  [address] [place] [address], [address], [address] [place]   [phone]  [facility]    Patient ID:  Sandip Toribio  [id number]   y.o. 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 Diagnosed initially 10/24/2013 with a recurrence in 2014. Pt completed Bleomycin/Etoposide/Cisplatin for recurrence 14. Her previous procedures include Oophorectomy, right, and D&C 10/24/2014. Patient is also status post Oophorectomy, left, Diagnostic Lap, Exploratory Lap, Resection of Right Pelvic Mass, Lysis of adhesions and Cystoscopy 2014. She is here today for a 6 month surveillance visit. Patient denies any Gyn symptoms. Patient specifically denies any abnormal vaginal bleeding, vaginal discharge, or vaginal irritation. Patient also denies abdominal pain, pelvic pain, or abdominal bloating. Patient is voiding without difficulty and bowel movements are regular. Patient reports residual neuropathy, taking Neurontin TID. Taking OCP continuously with relief of menopausal symptoms, denies ACHES. Admits to hot flashes, occurring daily. US TRANSVAGINAL: 5/15/15    IMPRESSION:  The uterus is within normal limits.  Both ovaries have been removed    Labs:  Component       Cancer Ag (CA) 125   Latest Ref Rng & Units       0.0 - 38.1 U/mL   2018      12:00 AM 6.5   2018      12:00 AM 6.2   10/26/2017      9:45 AM 7.0   2017      12:00 AM 6.8   2017      12:00 AM 7.1   2017      12:00 AM 6.8   10/11/2016      7:35 AM 8.1   2016      8:30 AM 9.3   6/3/2016      7:55 AM 8.4   2016      7:44 AM 7.6   2016      9:00 AM 8.1   3/15/2016      8:07 AM 8.1   2016      9:58 AM 6.5   2016      8:09 AM 7.6   2015      10:23 AM 9.0   2015      8:25 AM 9.8   10/12/2015      11:42 AM 9.0   2015 12:00 AM 8.2   8/4/2015      9:06 AM 9.1   7/7/2015      2:03 PM 10.2   6/5/2015      10:50 AM 8.7   5/12/2015      10:09 AM 8.8   4/6/2015      4:22 PM 8.3   3/5/2015      12:00 AM 8.1   1/5/2015      11:15 AM 12.8   12/12/2014      10:15 AM 13.5   11/21/2014      8:40 AM 14.2   10/31/2014      11:30 AM 11.2   9/26/2014      1:30 PM 33.6   5/29/2014      12:00 AM 23.8   5/7/2014      12:00 AM 11.5   3/21/2014      12:00 AM 16.3   2/20/2014      11:10 AM 22.0   1/20/2014      12:00 AM 12.6   12/30/2013      10:15 AM 11.8   11/25/2013      12:00 AM 46.0 (H)   11/4/2013      1:31 PM 82.7 (H)       Component       AFP, Serum, Tumor Marker   Latest Ref Rng & Units       0.0 - 8.3 ng/mL   5/4/2018      12:00 AM 4.2   2/27/2018      12:00 AM 4.3   10/26/2017       9:45 AM 4.3   7/11/2017      12:00 AM 5.0   4/25/2017      12:00 AM 3.8   2/9/2017      12:00 AM 4.2   10/11/2016       7:35 AM 4.6   7/6/2016       8:30 AM 3.9   6/3/2016       7:55 AM 3.9   5/11/2016       7:44 AM 4.1   4/14/2016       9:00 AM 3.6   3/15/2016       8:07 AM 4.2   2/17/2016       9:58 AM 3.2   1/8/2016       8:09 AM 4.1   12/2/2015      10:23 AM 5.6   11/12/2015       8:25 AM 4.7   10/12/2015      11:42 AM 4.0   9/18/2015      12:00 AM 4.3   8/4/2015       9:06 AM 4.7   7/7/2015       2:03 PM 4.0   6/5/2015      10:50 AM 4.2   5/12/2015      10:09 AM 4.1   4/6/2015       4:22 PM 4.2   3/5/2015      12:00 AM 4.1   1/23/2015      12:00 AM 6.0   1/5/2015      11:15 AM 6.3   12/4/2014      12:10 PM 8.8 (H)   10/31/2014      11:30 AM 54.6 (H)   10/10/2014       9:55 .5 (H)   9/8/2014      12:00 AM 17987.0 (H)   5/29/2014      12:00 AM 6693.0 (H)   4/30/2014      12:00 .1 (H)   3/21/2014      12:00 AM 59.1 (H)   2/20/2014      11:10 AM 20.4 (H)   1/20/2014      12:00 AM 21.4 (H)   12/30/2013      10:15 AM 30.8 (H)   11/25/2013      12:00 .2 (H)   11/4/2013       1:31 PM 3137.0 (H)       COMPREHENSIVE ROS:   Detailed positives and pertinent negatives in HPI      Past Medical History:   Diagnosis Date    Asthma     well controlled    [facility] ([facility]     left ovary h/o    Coagulation disorder ([facility]     Dyspepsia and other specified disorders of function of stomach     Hypokalemia 9/8/2014    Morbid obesity ([facility]     [facility]     PIH (pregnancy induced hypertension)     Pink eye disease, left        Past Surgical History:   Procedure Laterality Date    HX ADENOIDECTOMY Bilateral 10/2005    HX OOPHORECTOMY      left    HX POLYPECTOMY  1993    rectal       Social History     Social History    Marital status:      Spouse name: N/A    Number of children: N/A    Years of education: N/A     Occupational History    Not on file. Social History Main Topics    Smoking status: Never Smoker    Smokeless tobacco: Never Used    Alcohol use 0.0 oz/week      Comment: 1-2 mixed drinks monthly    Drug use: No    Sexual activity: Yes     Partners: Male     Birth control/ protection: Pill     Other Topics Concern    Not on file     Social History Narrative       Family History   Problem Relation Age of Onset    Cancer Father     Cancer Paternal Uncle        Current Outpatient Prescriptions on File Prior to Visit   Medication Sig Dispense Refill    norethindrone-ethinyl estradiol-iron (WANDA FE 1.5/30, 28,) 1.5 mg-30 mcg (21)/75 mg (7) tab TAKE 1 TABLET BY MOUTH ONCE A DAY DO NOT TAKE LAST 7 TABLETS DISCARD THEN  AND START NEW PACK 56 Tab 3    fluticasone (FLONASE) 50 mcg/actuation nasal spray 2 Sprays by Nasal route.  phentermine (ADIPEX-P) 37.5 mg tablet Take 37.5 mg by mouth.  gabapentin (NEURONTIN) 300 mg capsule Take 300 mg by mouth three (3) times daily.  loratadine (CLARITIN) 10 mg tablet Take 10 mg by mouth daily.  albuterol (PROAIR HFA) 90 mcg/actuation inhaler Take  by inhalation.  LORazepam (ATIVAN) 0.5 mg tablet Take 1 Tab by mouth every twelve (12) hours as needed for Anxiety.  Max Daily Amount: 1 mg. 60 Tab 0    ibuprofen (MOTRIN) 800 mg tablet Take 1 tablet by mouth every six (6) hours as needed for Pain. 90 tablet 1     No current facility-administered medications on file prior to visit. Allergies   Allergen Reactions    Penicillins Hives       OBJECTIVE:  PHYSICAL EXAM  VITAL SIGNS: There were no vitals taken for this visit. GENERAL ANDREW: Well nourished, well developed, no acute distress   HEENT: NCAT, EOMI, PERRL    RESPIRATORY: Lungs CTA bilaterally, no wheezing, rhonchi, or crackles    CARDIOVASC: RRR, S1 and S2 present, no murmurs, rubs, or gallop    GASTROINT: Soft, nontender, nondistended, NABS, no masses    MUSCULOSKEL: no joint tenderness, deformity or swelling    INTEGUMENT: no rash or abnormalities    EXTREMITIES: extremities normal, atraumatic, no cyanosis or edema    PELVIC: VULVA: normal appearing vulva with no masses, tenderness or lesions  VAGINA: normal appearing vagina, atrophic  CERVIX: normal appearing cervix   UTERUS: uterus is normal size, shape, consistency and nontender  ADNEXA: surgically absent bilateral.   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 Neuropathy, chemo induced, improved. Encouraged B6 and B12 use. Encourage patient to take Neurontin as prescribed or discuss with PCP. Microgestin for menopausal symptoms. Taking continuous. Instructed patient to discard pill pack after completed first 21 pills and start new pack. Reinforced taking pill at the same time daily to avoid breakthrough bleeding. Hot flashes. Patient currently taking Celexa for anxiety. Will increase dosing as this may also help with hot flashes. May consider alternative therapy such as Effexor if symptoms persist.   AFP and CA-125 WNL. Continue every 3 months. Follow up for surveillance in 6 months. Patient verbalizes understanding of information provided today and agrees with plan of care    Total time spent was 25 minutes regarding diagnosis of ovarian cancer and >50% of this time was spent counseling and coordinating care.     Radha [facility]  Gynecologic Oncology  5/7/2018/10:30 AM

## 2018-07-02 ENCOUNTER — TELEPHONE (OUTPATIENT)
Dept: ONCOLOGY | Age: 28
End: 2018-07-02

## 2018-08-01 DIAGNOSIS — C56.9 MALIGNANT NEOPLASM OF OVARY, UNSPECIFIED LATERALITY (HCC): ICD-10-CM

## 2018-08-01 DIAGNOSIS — C56.2 MALIGNANT GERM CELL TUMOR OF LEFT OVARY (HCC): ICD-10-CM

## 2018-10-01 RX ORDER — NORETHINDRONE ACETATE/ETHINYL ESTRADIOL AND FERROUS FUMARATE 1.5-30(21)
KIT ORAL
Qty: 56 TAB | Refills: 3 | Status: SHIPPED | OUTPATIENT
Start: 2018-10-01 | End: 2018-11-19 | Stop reason: SDUPTHER

## 2018-10-15 ENCOUNTER — HOSPITAL ENCOUNTER (OUTPATIENT)
Dept: LAB | Age: 28
Discharge: HOME | End: 2018-10-15
Attending: OBSTETRICS & GYNECOLOGY
Payer: COMMERCIAL

## 2018-10-15 PROCEDURE — 86304 IMMUNOASSAY TUMOR CA 125: CPT | Performed by: OBSTETRICS & GYNECOLOGY

## 2018-10-15 PROCEDURE — 82105 ALPHA-FETOPROTEIN SERUM: CPT | Performed by: OBSTETRICS & GYNECOLOGY

## 2018-10-15 PROCEDURE — 36415 COLL VENOUS BLD VENIPUNCTURE: CPT | Performed by: OBSTETRICS & GYNECOLOGY

## 2018-10-16 LAB
AFP-TM SERPL-MCNC: 4.9 NG/ML (ref 0–8.3)
CANCER AG125 SERPL-ACNC: 8.2 U/ML (ref 0–38.1)

## 2018-10-22 ENCOUNTER — TELEPHONE (OUTPATIENT)
Dept: ONCOLOGY | Age: 28
End: 2018-10-22

## 2018-10-22 NOTE — TELEPHONE ENCOUNTER
Attempted to contact patient to get patient scheduled for 6 month f/u appointment with Holly. Patient was not accepting calls at the time. I will try to contact patient again.

## 2018-11-16 ENCOUNTER — TELEPHONE (OUTPATIENT)
Dept: ONCOLOGY | Age: 28
End: 2018-11-16

## 2018-11-16 NOTE — TELEPHONE ENCOUNTER
Contacted patient per Holly and made patient aware Holly recommended for patient to follow up with PCP with this concern and matter. NP stated that if patient has any other conncerns or questions to  follow up with her. Patient was ok with the plan of care and requested for Holly to contact her still when she could.

## 2018-11-16 NOTE — TELEPHONE ENCOUNTER
Patient contacted the office stating that she had a  Bowl movement and notice a decent amount of blood in her stool. Patient stated that she is a little concerned and would like Holly to give her a call back as soon as possible.   [phone]

## 2018-11-19 RX ORDER — NORETHINDRONE ACETATE AND ETHINYL ESTRADIOL 1.5-30(21)
KIT ORAL
Qty: 56 TAB | Refills: 3 | Status: SHIPPED | OUTPATIENT
Start: 2018-11-19 | End: 2020-05-19 | Stop reason: ALTCHOICE

## 2018-11-19 NOTE — TELEPHONE ENCOUNTER
Returned call to patient, states that she had single episode of blood with BM on Friday, \"not BRB, not dark, just normal red, mixed with stool\". She denies bleeding since. No pain or other symptoms. Unsure whether she has hemorrhoids or not. She is going to call PCP office tomorrow based on their office hours. I explained importance of prompt f/u. Patient also requested refill of OCP. Will send to pharmacy on file. Transferred to front to make routine surveillance appt with our office as well. Patient to call sooner PRN. Patient agreeable.

## 2018-12-06 ENCOUNTER — OFFICE VISIT (OUTPATIENT)
Dept: ONCOLOGY | Age: 28
End: 2018-12-06

## 2018-12-06 VITALS
HEIGHT: 62 IN | BODY MASS INDEX: 38.94 KG/M2 | TEMPERATURE: 97.6 F | DIASTOLIC BLOOD PRESSURE: 89 MMHG | WEIGHT: 211.6 LBS | SYSTOLIC BLOOD PRESSURE: 138 MMHG | HEART RATE: 80 BPM | RESPIRATION RATE: 18 BRPM | OXYGEN SATURATION: 97 %

## 2018-12-06 DIAGNOSIS — C56.2 FEMALE YOLK SAC TUMOR, LEFT (HCC): ICD-10-CM

## 2018-12-06 DIAGNOSIS — C56.2 MALIGNANT GERM CELL TUMOR OF LEFT OVARY (HCC): Primary | ICD-10-CM

## 2018-12-06 RX ORDER — VENLAFAXINE HYDROCHLORIDE 150 MG/1
150 CAPSULE, EXTENDED RELEASE ORAL
COMMUNITY
Start: 2018-10-10 | End: 2021-01-29 | Stop reason: SDUPTHER

## 2018-12-06 RX ORDER — FLUTICASONE PROPIONATE 50 MCG
2 SPRAY, SUSPENSION (ML) NASAL
COMMUNITY
Start: 2017-07-25

## 2018-12-06 RX ORDER — FLUCONAZOLE 150 MG/1
TABLET ORAL
Refills: 0 | COMMUNITY
Start: 2018-10-25 | End: 2019-06-11 | Stop reason: ALTCHOICE

## 2018-12-06 RX ORDER — PANTOPRAZOLE SODIUM 20 MG/1
20 TABLET, DELAYED RELEASE ORAL
COMMUNITY
Start: 2018-10-10 | End: 2021-01-25 | Stop reason: SDUPTHER

## 2018-12-06 NOTE — PROGRESS NOTES
[facility]  [address] [place] [address], [address], [address] [place] [phone]  [facility]    Patient ID:  Jennifer Dick  [id number]   y.o. 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 Diagnosed initially 10/24/2013 with a recurrence in 2014. Pt completed Bleomycin/Etoposide/Cisplatin for recurrence 14. Her previous procedures include Oophorectomy, right, and D&C 10/24/2014. Patient is also status post Oophorectomy, left, Diagnostic Lap, Exploratory Lap, Resection of Right Pelvic Mass, Lysis of adhesions and Cystoscopy 2014. She is here today for a 6 month surveillance visit. Patient denies any Gyn symptoms. Patient specifically denies any abnormal vaginal bleeding, vaginal discharge, or vaginal irritation. Patient also denies abdominal pain, pelvic pain, or abdominal bloating. Patient is voiding without difficulty and bowel movements are regular. Patient reports residual neuropathy, taking Neurontin TID. Taking Effexor and OCP continuously with relief of menopausal symptoms, denies ACHES. US TRANSVAGINAL: 5/15/15    IMPRESSION:  The uterus is within normal limits.  Both ovaries have been removed    Labs:  Component       Cancer Ag (CA) 125   Latest Ref Rng & Units       0.0 - 38.1 U/mL   10/15/2018      9:03 AM 8.2   10/26/2017      9:45 AM 7.0   2017      12:00 AM 6.8   2017      12:00 AM 6.8   10/11/2016      7:35 AM 8.1   2016      8:30 AM 9.3   6/3/2016      7:55 AM 8.4   2016      7:44 AM 7.6   2016      9:00 AM 8.1   3/15/2016      8:07 AM 8.1   2016      9:58 AM 6.5   2016      8:09 AM 7.6   2015      10:23 AM 9.0   2015      8:25 AM 9.8   10/12/2015      11:42 AM 9.0   2015      12:00 AM 8.2   2015      9:06 AM 9.1   2015      2:03 PM 10.2   2015 10:50 AM 8.7   5/12/2015      10:09 AM 8.8   4/6/2015      4:22 PM 8.3   3/5/2015      12:00 AM 8.1   1/5/2015      11:15 AM 12.8   12/12/2014      10:15 AM 13.5   11/21/2014      8:40 AM 14.2   10/31/2014      11:30 AM 11.2   9/26/2014      1:30 PM 33.6   5/7/2014      12:00 AM 11.5   1/20/2014      12:00 AM 12.6   12/30/2013      10:15 AM 11.8   11/25/2013      12:00 AM 46.0 (H)   11/4/2013      1:31 PM 82.7 (H)       Component       AFP, Serum, Tumor Marker   Latest Ref Rng & Units       0.0 - 8.3 ng/mL   10/15/2018       9:03 AM 4.9   5/4/2018      12:00 AM 4.2   2/27/2018      12:00 AM 4.3   10/26/2017       9:45 AM 4.3   7/11/2017      12:00 AM 5.0   4/25/2017      12:00 AM 3.8   2/9/2017      12:00 AM 4.2   10/11/2016       7:35 AM 4.6   7/6/2016       8:30 AM 3.9   6/3/2016       7:55 AM 3.9   5/11/2016       7:44 AM 4.1   4/14/2016       9:00 AM 3.6   3/15/2016       8:07 AM 4.2   2/17/2016       9:58 AM 3.2   1/8/2016       8:09 AM 4.1   12/2/2015      10:23 AM 5.6   11/12/2015       8:25 AM 4.7   10/12/2015      11:42 AM 4.0   9/18/2015      12:00 AM 4.3   8/4/2015       9:06 AM 4.7   7/7/2015       2:03 PM 4.0   6/5/2015      10:50 AM 4.2   5/12/2015      10:09 AM 4.1   4/6/2015       4:22 PM 4.2   3/5/2015      12:00 AM 4.1   1/23/2015      12:00 AM 6.0   1/5/2015      11:15 AM 6.3   12/4/2014      12:10 PM 8.8 (H)   10/31/2014      11:30 AM 54.6 (H)   10/10/2014       9:55 .5 (H)   9/8/2014      12:00 AM 25,163.0 (H)   5/29/2014      12:00 AM 6,693.0 (H)   4/30/2014      12:00 .1 (H)   3/21/2014      12:00 AM 59.1 (H)   2/20/2014      11:10 AM 20.4 (H)   1/20/2014      12:00 AM 21.4 (H)   12/30/2013      10:15 AM 30.8 (H)   11/25/2013      12:00 .2 (H)   11/4/2013       1:31 PM 3,137.0 (H)         COMPREHENSIVE ROS:   Detailed positives and pertinent negatives in HPI      Past Medical History:   Diagnosis Date    Asthma     well controlled    [facility]     left ovary h/o    [facility]     Dyspepsia and other specified disorders of function of stomach     Hypokalemia 9/8/2014    [facility]     [facility]     PIH (pregnancy induced hypertension)     Pink eye disease, left        Past Surgical History:   Procedure Laterality Date    HX ADENOIDECTOMY Bilateral 10/2005    HX OOPHORECTOMY      left    HX POLYPECTOMY  1993    rectal       Social History     Socioeconomic History    Marital status:      Spouse name: Not on file    Number of children: Not on file    Years of education: Not on file    Highest education level: Not on file   Social Needs    Financial resource strain: Not on file    Food insecurity - worry: Not on file    Food insecurity - inability: Not on file   [employer] needs - medical: Not on file   [email or web address] needs - non-medical: Not on file   Occupational History    Not on file   Tobacco Use    Smoking status: Never Smoker    Smokeless tobacco: Never Used   Substance and Sexual Activity    Alcohol use: Yes     Alcohol/week: 0.0 oz     Comment: 1-2 mixed drinks monthly    Drug use: No    Sexual activity: Yes     Partners: Male     Birth control/protection: Pill   Other Topics Concern    Not on file   Social History Narrative    Not on file       Family History   Problem Relation Age of Onset    Cancer Father     Cancer Paternal Uncle        Current Outpatient Medications on File Prior to Visit   Medication Sig Dispense Refill    fluticasone (FLONASE) 50 mcg/actuation nasal spray 2 Sprays by Nasal route.  pantoprazole (PROTONIX) 20 mg tablet Take 20 mg by mouth.  venlafaxine-SR (EFFEXOR-XR) 150 mg capsule Take 150 mg by mouth.  gabapentin (NEURONTIN) 300 mg capsule Take 300 mg by mouth three (3) times daily.  albuterol (PROAIR HFA) 90 mcg/actuation inhaler Take  by inhalation.       LORazepam (ATIVAN) 0.5 mg tablet Take 1 Tab by mouth every twelve (12) hours as needed for Anxiety. Max Daily Amount: 1 mg. 60 Tab 0    ibuprofen (MOTRIN) 800 mg tablet Take 1 tablet by mouth every six (6) hours as needed for Pain. 90 tablet 1    fluconazole (DIFLUCAN) 150 mg tablet take 1 tablet by mouth as a single dose  0    norethindrone-ethinyl estradiol-iron (WANDA FE 1.5/30, 28,) 1.5 mg-30 mcg (21)/75 mg (7) tab take 1 tablet by mouth once daily DO NOT TAKE LAST 7 TABS,DISCARD AND START NEW PACK 56 Tab 3    phentermine (ADIPEX-P) 37.5 mg tablet Take 37.5 mg by mouth. No current facility-administered medications on file prior to visit. Allergies   Allergen Reactions    Penicillins Hives       OBJECTIVE:  PHYSICAL EXAM  VITAL SIGNS: Visit Vitals  /89 (BP 1 Location: Right arm, BP Patient Position: Sitting)   Pulse 80   Temp 97.6 °F (36.4 °C) (Oral)   Resp 18   Ht 5' 2\" (1.575 m)   Wt 96 kg (211 lb 9.6 oz)   SpO2 97%   BMI 38.70 kg/m²      GENERAL ANDREW: Well nourished, well developed, no acute distress   HEENT: NCAT, EOMI, PERRL    RESPIRATORY: Lungs CTA bilaterally, no wheezing, rhonchi, or crackles    CARDIOVASC: RRR, S1 and S2 present, no murmurs, rubs, or gallop    GASTROINT: Soft, nontender, nondistended, NABS, no masses    MUSCULOSKEL: no joint tenderness, deformity or swelling    INTEGUMENT: no rash or abnormalities    EXTREMITIES: extremities normal, atraumatic, no cyanosis or edema    PELVIC: VULVA: normal appearing vulva with no masses, tenderness or lesions  VAGINA: normal appearing vagina, atrophic  CERVIX: normal appearing cervix   UTERUS: uterus is normal size, shape, consistency and nontender  ADNEXA: surgically absent bilateral.   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 Neuropathy, chemo induced, improved.  Encouraged B6 and B12 use. Encourage patient to take Neurontin as prescribed or discuss with PCP. Microgestin for menopausal symptoms. Taking continuous. Instructed patient to discard pill pack after completed first 21 pills and start new pack. Reinforced taking pill at the same time daily to avoid breakthrough bleeding. Hot flashes. Well managed with OCP and Effexor. AFP and CA-125 WNL. Continue every 6 months. Follow up for surveillance in 6 months. Patient verbalizes understanding of information provided today and agrees with plan of care    Total time spent was 25 minutes regarding diagnosis of ovarian cancer and >50% of this time was spent counseling and coordinating care.     Julio Flynn [facility]  Gynecologic Oncology  12/6/2018/10:30 AM

## 2018-12-06 NOTE — PROGRESS NOTES
Rocio Lehman, a 33 y.o. female,  is here for   Chief Complaint   Patient presents with    Ovarian Cancer     6 month follow up       Visit Vitals  /89 (BP 1 Location: Right arm, BP Patient Position: Sitting)   Pulse 80   Temp 97.6 °F (36.4 °C) (Oral)   Resp 18   Ht 5' 2\" (1.575 m)   Wt 96 kg (211 lb 9.6 oz)   SpO2 97%   BMI 38.70 kg/m²     Patient denies any persistent or worsening abdominal or pelvic pain. Denies any unusual vaginal bleeding, discharge, irritation, or odor. Denies experiencing any constipation or diarrhea. No burning, discomfort, or irritation with urination. 1. Have you been to the ER, urgent care clinic since your last visit? Hospitalized since your last visit? No    2. Have you seen or consulted any other health care providers outside of the [address] [place] [address] since your last visit? Include any pap smears or colon screening.  No

## 2018-12-27 NOTE — PROGRESS NOTES
Unable to cancel order because it is already linked to a scheduled appointment, however this is a duplicate order.

## 2019-03-28 ENCOUNTER — HOSPITAL ENCOUNTER (OUTPATIENT)
Dept: LAB | Age: 29
Discharge: HOME | End: 2019-03-28
Payer: COMMERCIAL

## 2019-03-28 DIAGNOSIS — C56.2 MALIGNANT GERM CELL TUMOR OF LEFT OVARY (HCC): ICD-10-CM

## 2019-03-28 DIAGNOSIS — C56.2 FEMALE YOLK SAC TUMOR, LEFT (HCC): ICD-10-CM

## 2019-03-28 PROCEDURE — 82105 ALPHA-FETOPROTEIN SERUM: CPT

## 2019-03-28 PROCEDURE — 86304 IMMUNOASSAY TUMOR CA 125: CPT

## 2019-03-28 PROCEDURE — 36415 COLL VENOUS BLD VENIPUNCTURE: CPT

## 2019-03-29 ENCOUNTER — TELEPHONE (OUTPATIENT)
Dept: ONCOLOGY | Age: 29
End: 2019-03-29

## 2019-03-29 LAB
AFP-TM SERPL-MCNC: 4.7 NG/ML (ref 0–8.3)
CANCER AG125 SERPL-ACNC: 7.6 U/ML (ref 0–38.1)

## 2019-03-29 NOTE — TELEPHONE ENCOUNTER
Reviewed nl tumor markers, patient transferred to make f/u appt for June. Advised to call sooner PRN. Patient agreeable.

## 2019-04-17 RX ORDER — NORETHINDRONE ACETATE/ETHINYL ESTRADIOL AND FERROUS FUMARATE 1.5-30(21)
KIT ORAL
Qty: 56 TAB | Refills: 6 | Status: SHIPPED | OUTPATIENT
Start: 2019-04-17 | End: 2020-05-19 | Stop reason: ALTCHOICE

## 2019-06-07 ENCOUNTER — HOSPITAL ENCOUNTER (OUTPATIENT)
Dept: LAB | Age: 29
Discharge: HOME | End: 2019-06-07
Payer: COMMERCIAL

## 2019-06-07 DIAGNOSIS — C56.2 FEMALE YOLK SAC TUMOR, LEFT (HCC): ICD-10-CM

## 2019-06-07 DIAGNOSIS — C56.2 MALIGNANT GERM CELL TUMOR OF LEFT OVARY (HCC): Primary | ICD-10-CM

## 2019-06-07 DIAGNOSIS — C56.9 MALIGNANT NEOPLASM OF OVARY, UNSPECIFIED LATERALITY (HCC): ICD-10-CM

## 2019-06-07 DIAGNOSIS — C56.2 MALIGNANT GERM CELL TUMOR OF LEFT OVARY (HCC): ICD-10-CM

## 2019-06-07 PROCEDURE — 86304 IMMUNOASSAY TUMOR CA 125: CPT

## 2019-06-07 PROCEDURE — 36415 COLL VENOUS BLD VENIPUNCTURE: CPT

## 2019-06-07 PROCEDURE — 82105 ALPHA-FETOPROTEIN SERUM: CPT

## 2019-06-08 LAB
AFP-TM SERPL-MCNC: 3.8 NG/ML (ref 0–8.3)
CANCER AG125 SERPL-ACNC: 6 U/ML (ref 1.5–35)

## 2019-06-11 ENCOUNTER — OFFICE VISIT (OUTPATIENT)
Dept: ONCOLOGY | Age: 29
End: 2019-06-11

## 2019-06-11 VITALS
HEIGHT: 62 IN | BODY MASS INDEX: 39.6 KG/M2 | RESPIRATION RATE: 14 BRPM | WEIGHT: 215.2 LBS | SYSTOLIC BLOOD PRESSURE: 134 MMHG | OXYGEN SATURATION: 96 % | DIASTOLIC BLOOD PRESSURE: 96 MMHG | HEART RATE: 96 BPM | TEMPERATURE: 98.4 F

## 2019-06-11 DIAGNOSIS — C56.2 MALIGNANT GERM CELL TUMOR OF LEFT OVARY (HCC): Primary | ICD-10-CM

## 2019-06-11 RX ORDER — PAROXETINE 30 MG/1
30 TABLET, FILM COATED ORAL
COMMUNITY
Start: 2019-03-12 | End: 2020-07-09 | Stop reason: ALTCHOICE

## 2019-06-11 NOTE — PATIENT INSTRUCTIONS

## 2019-06-11 NOTE — PROGRESS NOTES
[facility]  [address] [place] [address], [address], [address]  [phone]  [facility]    Patient ID:  Joey Monteiro  [id number]   y.o. 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 Diagnosed initially 10/24/2013 with a recurrence in 2014. Pt completed Bleomycin/Etoposide/Cisplatin for recurrence 14. Her previous procedures include Oophorectomy, right, and D&C 10/24/2014. Patient is also status post Oophorectomy, left, Diagnostic Lap, Exploratory Lap, Resection of Right Pelvic Mass, Lysis of adhesions and Cystoscopy 2014. She is here today for a 6 month surveillance visit. Patient denies any Gyn symptoms. Patient specifically denies any abnormal vaginal bleeding, vaginal discharge, or vaginal irritation. Patient also denies abdominal pain, pelvic pain, or abdominal bloating. Patient is voiding without difficulty and bowel movements are regular. Patient reports residual neuropathy, taking Neurontin TID. Taking OCP continuously without complaint, denies ACHES. Was switched from Effexor to Paxil by PCP, but would like to transition back to Effexor for relief of menopausal symptoms, primarily hot flashes. US TRANSVAGINAL: 5/15/15    IMPRESSION:  The uterus is within normal limits.  Both ovaries have been removed    Labs:  Component       Cancer Ag (CA) 125 CA-125   Latest Ref Rng & Units       0.0 - 38.1 U/mL 1.5 - 35.0 U/mL   2019      10:05 AM  6   3/28/2019      10:58 AM 7.6    10/15/2018      9:03 AM 8.2    10/26/2017      9:45 AM 7.0    2017      12:00 AM 6.8    2017      12:00 AM 6.8    10/11/2016      7:35 AM 8.1    2016      8:30 AM 9.3    6/3/2016      7:55 AM 8.4    2016      7:44 AM 7.6    2016      9:00 AM 8.1    3/15/2016      8:07 AM 8.1    2016      9:58 AM 6.5 1/8/2016      8:09 AM 7.6    12/2/2015      10:23 AM 9.0    11/12/2015      8:25 AM 9.8    10/12/2015      11:42 AM 9.0    9/18/2015      12:00 AM 8.2    8/4/2015      9:06 AM 9.1    7/7/2015      2:03 PM 10.2    6/5/2015      10:50 AM 8.7    5/12/2015      10:09 AM 8.8    4/6/2015      4:22 PM 8.3    3/5/2015      12:00 AM 8.1    1/5/2015      11:15 AM 12.8    12/12/2014      10:15 AM 13.5    11/21/2014      8:40 AM 14.2    10/31/2014      11:30 AM 11.2    9/26/2014      1:30 PM 33.6    5/7/2014      12:00 AM 11.5    1/20/2014      12:00 AM 12.6    12/30/2013      10:15 AM 11.8    11/25/2013      12:00 AM 46.0 (H)    11/4/2013      1:31 PM 82.7 (H)      Component       AFP, Serum, Tumor Marker   Latest Ref Rng & Units       0.0 - 8.3 ng/mL   6/7/2019      10:05 AM 3.8   3/28/2019      10:58 AM 4.7   10/15/2018       9:03 AM 4.9   5/4/2018      12:00 AM 4.2   2/27/2018      12:00 AM 4.3   10/26/2017       9:45 AM 4.3   7/11/2017      12:00 AM 5.0   4/25/2017      12:00 AM 3.8   2/9/2017      12:00 AM 4.2   10/11/2016       7:35 AM 4.6   7/6/2016       8:30 AM 3.9   6/3/2016       7:55 AM 3.9   5/11/2016       7:44 AM 4.1   4/14/2016       9:00 AM 3.6   3/15/2016       8:07 AM 4.2   2/17/2016       9:58 AM 3.2   1/8/2016       8:09 AM 4.1   12/2/2015      10:23 AM 5.6   11/12/2015       8:25 AM 4.7   10/12/2015      11:42 AM 4.0   9/18/2015      12:00 AM 4.3   8/4/2015       9:06 AM 4.7   7/7/2015       2:03 PM 4.0   6/5/2015      10:50 AM 4.2   5/12/2015      10:09 AM 4.1   4/6/2015       4:22 PM 4.2   3/5/2015      12:00 AM 4.1   1/23/2015      12:00 AM 6.0   1/5/2015      11:15 AM 6.3   12/4/2014      12:10 PM 8.8 (H)   10/31/2014      11:30 AM 54.6 (H)   10/10/2014       9:55 .5 (H)   9/8/2014      12:00 AM 25,163.0 (H)   5/29/2014      12:00 AM 6,693.0 (H)   4/30/2014      12:00 .1 (H)   3/21/2014      12:00 AM 59.1 (H)   2/20/2014      11:10 AM 20.4 (H)   1/20/2014      12:00 AM 21.4 (H) 12/30/2013      10:15 AM 30.8 (H)   11/25/2013      12:00 .2 (H)   11/4/2013       1:31 PM 3,137.0 (H)           COMPREHENSIVE ROS:   Detailed positives and pertinent negatives in HPI      Past Medical History:   Diagnosis Date    Anxiety     Asthma     well controlled    Cancer ([facility]     left ovary h/o    [facility]     Dyspepsia and other specified disorders of function of stomach     Hypokalemia 9/8/2014    [facility]     Ovarian cancer ([facility]     PIH (pregnancy induced hypertension)     Pink eye disease, left        Past Surgical History:   Procedure Laterality Date    HX ADENOIDECTOMY Bilateral 10/2005    HX OOPHORECTOMY      left    HX POLYPECTOMY  1993    rectal       Social History     Socioeconomic History    Marital status:      Spouse name: Not on file    Number of children: Not on file    Years of education: Not on file    Highest education level: Not on file   Occupational History    Not on file   Social Needs    Financial resource strain: Not on file    Food insecurity:     Worry: Not on file     Inability: Not on file    Transportation needs:     Medical: Not on file     Non-medical: Not on file   Tobacco Use    Smoking status: Never Smoker    Smokeless tobacco: Never Used   Substance and Sexual Activity    Alcohol use:  Yes     Alcohol/week: 0.0 oz     Comment: 1-2 mixed drinks monthly    Drug use: No    Sexual activity: Yes     Partners: Male     Birth control/protection: Pill   Lifestyle    Physical activity:     Days per week: Not on file     Minutes per session: Not on file    Stress: Not on file   Relationships    Social connections:     Talks on phone: Not on file     Gets together: Not on file     Attends Confucianism service: Not on file     Active member of club or organization: Not on file     Attends meetings of clubs or organizations: Not on file     Relationship status: Not on file    Intimate partner violence:     Fear of current or ex partner: Not on file     Emotionally abused: Not on file     Physically abused: Not on file     Forced sexual activity: Not on file   Other Topics Concern    Not on file   Social History Narrative    Not on file       Family History   Problem Relation Age of Onset    Cancer Father     Cancer Paternal Uncle        Current Outpatient Medications on File Prior to Visit   Medication Sig Dispense Refill    PARoxetine (PAXIL) 30 mg tablet Take 30 mg by mouth.  pantoprazole (PROTONIX) 20 mg tablet Take 20 mg by mouth.  norethindrone-ethinyl estradiol-iron (WANDA FE 1.5/30, 28,) 1.5 mg-30 mcg (21)/75 mg (7) tab take 1 tablet by mouth once daily DO NOT TAKE LAST 7 TABS,DISCARD AND START NEW PACK 56 Tab 3    phentermine (ADIPEX-P) 37.5 mg tablet Take 37.5 mg by mouth.  gabapentin (NEURONTIN) 300 mg capsule Take 300 mg by mouth three (3) times daily.  albuterol (PROAIR HFA) 90 mcg/actuation inhaler Take  by inhalation.  LORazepam (ATIVAN) 0.5 mg tablet Take 1 Tab by mouth every twelve (12) hours as needed for Anxiety. Max Daily Amount: 1 mg. 60 Tab 0    ibuprofen (MOTRIN) 800 mg tablet Take 1 tablet by mouth every six (6) hours as needed for Pain. 90 tablet 1    WANDA FE 1.5/30, 28, 1.5 mg-30 mcg (21)/75 mg (7) tab take 1 tablet by mouth once daily (DO NOT TAKE LAST 7 TABLETS,DISCARD AND START NEW PACK) 56 Tab 6    fluticasone (FLONASE) 50 mcg/actuation nasal spray 2 Sprays by Nasal route.  venlafaxine-SR (EFFEXOR-XR) 150 mg capsule Take 150 mg by mouth. No current facility-administered medications on file prior to visit.         Allergies   Allergen Reactions    Penicillins Hives       OBJECTIVE:  PHYSICAL EXAM  VITAL SIGNS: Visit Vitals  BP (!) 134/96 (BP 1 Location: Left arm, BP Patient Position: Sitting)   Pulse 96   Temp 98.4 °F (36.9 °C) (Oral)   Resp 14   Ht 5' 2\" (1.575 m)   Wt 97.6 kg (215 lb 3.2 oz)   SpO2 96%   BMI 39.36 kg/m²      GENERAL ANDREW: Well nourished, well developed, no acute distress   HEENT: NCAT, EOMI, PERRL    RESPIRATORY: Lungs CTA bilaterally, no wheezing, rhonchi, or crackles    CARDIOVASC: RRR, S1 and S2 present, no murmurs, rubs, or gallop    GASTROINT: Soft, nontender, nondistended, NABS, no masses    MUSCULOSKEL: no joint tenderness, deformity or swelling    INTEGUMENT: no rash or abnormalities    EXTREMITIES: extremities normal, atraumatic, no cyanosis or edema    PELVIC: VULVA: normal appearing vulva with no masses, tenderness or lesions  VAGINA: normal appearing vagina, atrophic  CERVIX: normal appearing cervix   UTERUS: uterus is normal size, shape, consistency and nontender  ADNEXA: surgically absent bilateral.   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 Neuropathy, chemo induced, improved. Encouraged B6 and B12 use. Encourage patient to take Neurontin as prescribed or discuss with PCP. Hot flashes. Continue OCP. Recommended discussing transitioning off Paxil and back to Effexor under care of PCP. AFP and CA-125 WNL. Continue every 6 months. Follow up for surveillance in 6 months. Patient verbalizes understanding of information provided today and agrees with plan of care    Total time spent was 25 minutes regarding diagnosis of ovarian cancer and >50% of this time was spent counseling and coordinating care.     Jodine Angelucci [facility]  Gynecologic Oncology  6/11/2019/10:30 AM

## 2019-06-11 NOTE — PROGRESS NOTES
Kiarra Gooden, a 33 y.o. female,  is here for   Chief Complaint   Patient presents with    Other     6 month surveillance of recurrent yolk sac tumor of ovary       Visit Vitals  BP (!) 134/96 (BP 1 Location: Left arm, BP Patient Position: Sitting)   Pulse 96   Temp 98.4 °F (36.9 °C) (Oral)   Resp 14   Ht 5' 2\" (1.575 m)   Wt 97.6 kg (215 lb 3.2 oz)   SpO2 96%   BMI 39.36 kg/m²     Patient is wondering how long she has to take Birth Control and if her previous chemotherapy treatment has any effect on her weight gain. Patient denies any persistent or worsening abdominal or pelvic pain. Denies any unusual vaginal bleeding, discharge, irritation, or odor. Denies experiencing any constipation or diarrhea. No burning, discomfort, or irritation with urination. 1. Have you been to the ER, urgent care clinic since your last visit? Hospitalized since your last visit? No    2. Have you seen or consulted any other health care providers outside of the [address] [place] [address] since your last visit? Include any pap smears or colon screening.  No

## 2019-10-31 NOTE — TELEPHONE ENCOUNTER
Protocol For Bath Puva: The patient received Bath PUVA. Reviewed nl labs with pt.  F/u as scheduled in April Protocol For Puva: The patient received PUVA. Total Body Energy: 6240 Protocol For Photochemotherapy: Mineral Oil And Nbuvb: The patient received Photochemotherapy: Mineral Oil and NBUVB (mineral oil applied to all lesions prior to phototherapy). Protocol For Photochemotherapy: Mineral Oil And Broad Band Uvb: The patient received Photochemotherapy: Mineral Oil and Broad Band UVB. Protocol For Photochemotherapy For Severe Photoresponsive Dermatoses: Tar And Nbuvb (Goeckerman Treatment): The patient received Photochemotherapy for severe photoresponsive dermatoses: Tar and NBUVB (Goeckerman treatment) requiring at least 4 to 8 hours of care under direct physician supervision. Render Post-Care In The Note: no Protocol For Protocol For Photochemotherapy For Severe Photoresponsive Dermatoses: Bath Puva: The patient received Photochemotherapy for severe photoresponsive dermatoses: Bath PUVA requiring at least 4 to 8 hours of care under direct physician supervision. Protocol For Photochemotherapy For Severe Photoresponsive Dermatoses: Petrolatum And Broad Band Uvb: The patient received Photochemotherapyfor severe photoresponsive dermatoses: Petrolatum and Broad Band UVB requiring at least 4 to 8 hours of care under direct physician supervision. Changes In Treatment Protocol: Same Treatment Number: 88 Protocol For Photochemotherapy For Severe Photoresponsive Dermatoses: Puva: The patient received Photochemotherapy for severe photoresponsive dermatoses: PUVA requiring at least 4 to 8 hours of care under direct physician supervision. Protocol For Photochemotherapy: Petrolatum And Broad Band Uvb: The patient received Photochemotherapy: Petrolatum and Broad Band UVB. Protocol: Photochemotherapy: Petrolatum and NBUVB Post-Care Instructions: I reviewed with the patient in detail post-care instructions. Patient is to wear sun protection. Patients may expect sunburn like redness, discomfort and scabbing. Protocol For Broad Band Uvb: The patient received Broad Band UVB. Protocol For Uva: The patient received UVA. Protocol For Photochemotherapy For Severe Photoresponsive Dermatoses: Tar And Broad Band Uvb (Goeckerman Treatment): The patient received Photochemotherapy for severe photoresponsive dermatoses: Tar and Broad Band UVB (Goeckerman treatment) requiring at least 4 to 8 hours of care under direct physician supervision. Protocol For Nbuvb: The patient received NBUVB. Name Of Supervising Technician: Jeanne ESTEVES Protocol For Photochemotherapy: Triamcinolone Ointment And Nbuvb: The patient received Photochemotherapy: Triamcinolone and NBUVB (triamcinolone ointment applied to all lesions prior to phototherapy). Protocol For Photochemotherapy: Petrolatum And Nbuvb: The patient received Photochemotherapy: Petrolatum and NBUVB (petrolatum applied to all lesions prior to phototherapy). Protocol For Photochemotherapy: Baby Oil And Nbuvb: The patient received Photochemotherapy: Baby Oil and NBUVB (baby oil applied to all lesions prior to phototherapy). Protocol For Photochemotherapy For Severe Photoresponsive Dermatoses: Petrolatum And Nbuvb: The patient received Photochemotherapy for severe photoresponsive dermatoses: Petrolatum and NBUVB requiring at least 4 to 8 hours of care under direct physician supervision. Protocol For Photochemotherapy: Tar And Nbuvb (Goeckerman Treatment): The patient received Photochemotherapy: Tar and NBUVB (Goeckerman treatment). Protocol For Uva1: The patient received UVA1. Total Treatment Time: 6:19 Protocol For Photochemotherapy: Tar And Broad Band Uvb (Goeckerman Treatment): The patient received Photochemotherapy: Tar and Broad Band UVB (Goeckerman treatment). Treatment Number: 93 Detail Level: Generalized Comments: Pt placed in light box unit by Sheila Protocol For Nb Uva: The patient received NB UVA. Consent: Written consent obtained.  The risks were reviewed with the patient including but not limited to: burn, pigmentary changes, pain, blistering, scabbing, redness, increased risk of skin cancers, and the remote possibility of scarring.

## 2019-12-03 ENCOUNTER — HOSPITAL ENCOUNTER (OUTPATIENT)
Dept: LAB | Age: 29
Discharge: HOME | End: 2019-12-03
Payer: SELF-PAY

## 2019-12-03 DIAGNOSIS — C56.2 MALIGNANT GERM CELL TUMOR OF LEFT OVARY (HCC): ICD-10-CM

## 2019-12-03 PROCEDURE — 82105 ALPHA-FETOPROTEIN SERUM: CPT

## 2019-12-03 PROCEDURE — 86304 IMMUNOASSAY TUMOR CA 125: CPT

## 2019-12-03 PROCEDURE — 36415 COLL VENOUS BLD VENIPUNCTURE: CPT

## 2019-12-04 LAB
AFP-TM SERPL-MCNC: 4 NG/ML (ref 0–8.3)
CANCER AG125 SERPL-ACNC: 4 U/ML (ref 1.5–35)

## 2019-12-05 ENCOUNTER — TELEPHONE (OUTPATIENT)
Dept: ONCOLOGY | Age: 29
End: 2019-12-05

## 2019-12-12 NOTE — PROGRESS NOTES
[facility]  [address] [place] [address], [address], [address] [phone]      Patient ID:  Reji Castro  [id number]   y.o. 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 Diagnosed initially 10/24/2013 with a recurrence in 2014. Pt completed Bleomycin/Etoposide/Cisplatin for recurrence 14. Her previous procedures include Oophorectomy, right, and D&C 10/24/2014. Patient is also status post Oophorectomy, left, Diagnostic Lap, Exploratory Lap, Resection of Right Pelvic Mass, Lysis of adhesions and Cystoscopy 2014. She is here today for a 6 month surveillance visit. Patient denies any Gyn symptoms. Patient specifically denies any abnormal vaginal bleeding, vaginal discharge, or vaginal irritation. Patient also denies abdominal pain, pelvic pain, or abdominal bloating. Patient is voiding without difficulty and bowel movements are regular. Taking OCP continuously without complaint. Has some breakthrough bleeding when she forgets to take pill and then has some associated itching. US TRANSVAGINAL: 5/15/15    IMPRESSION:  The uterus is within normal limits.  Both ovaries have been removed    Labs:  Component      Latest Ref Rng & Units 12/3/2019 12/3/2019           9:54 AM  9:54 AM   AFP, Serum, Tumor Marker      0.0 - 8.3 ng/mL  4.0   CA-125      1.5 - 35.0 U/mL 4      Component       Cancer Ag (CA) 125 CA-125   Latest Ref Rng & Units       0.0 - 38.1 U/mL 1.5 - 35.0 U/mL   2019      10:05 AM  6   3/28/2019      10:58 AM 7.6    10/15/2018      9:03 AM 8.2    10/26/2017      9:45 AM 7.0    2017      12:00 AM 6.8    2017      12:00 AM 6.8    10/11/2016      7:35 AM 8.1    2016      8:30 AM 9.3    6/3/2016      7:55 AM 8.4    2016      7:44 AM 7.6    2016      9:00 AM 8.1    3/15/2016      8:07 AM 8.1 2/17/2016      9:58 AM 6.5    1/8/2016      8:09 AM 7.6    12/2/2015      10:23 AM 9.0    11/12/2015      8:25 AM 9.8    10/12/2015      11:42 AM 9.0    9/18/2015      12:00 AM 8.2    8/4/2015      9:06 AM 9.1    7/7/2015      2:03 PM 10.2    6/5/2015      10:50 AM 8.7    5/12/2015      10:09 AM 8.8    4/6/2015      4:22 PM 8.3    3/5/2015      12:00 AM 8.1    1/5/2015      11:15 AM 12.8    12/12/2014      10:15 AM 13.5    11/21/2014      8:40 AM 14.2    10/31/2014      11:30 AM 11.2    9/26/2014      1:30 PM 33.6    5/7/2014      12:00 AM 11.5    1/20/2014      12:00 AM 12.6    12/30/2013      10:15 AM 11.8    11/25/2013      12:00 AM 46.0 (H)    11/4/2013      1:31 PM 82.7 (H)      Component       AFP, Serum, Tumor Marker   Latest Ref Rng & Units       0.0 - 8.3 ng/mL   6/7/2019      10:05 AM 3.8   3/28/2019      10:58 AM 4.7   10/15/2018       9:03 AM 4.9   5/4/2018      12:00 AM 4.2   2/27/2018      12:00 AM 4.3   10/26/2017       9:45 AM 4.3   7/11/2017      12:00 AM 5.0   4/25/2017      12:00 AM 3.8   2/9/2017      12:00 AM 4.2   10/11/2016       7:35 AM 4.6   7/6/2016       8:30 AM 3.9   6/3/2016       7:55 AM 3.9   5/11/2016       7:44 AM 4.1   4/14/2016       9:00 AM 3.6   3/15/2016       8:07 AM 4.2   2/17/2016       9:58 AM 3.2   1/8/2016       8:09 AM 4.1   12/2/2015      10:23 AM 5.6   11/12/2015       8:25 AM 4.7   10/12/2015      11:42 AM 4.0   9/18/2015      12:00 AM 4.3   8/4/2015       9:06 AM 4.7   7/7/2015       2:03 PM 4.0   6/5/2015      10:50 AM 4.2   5/12/2015      10:09 AM 4.1   4/6/2015       4:22 PM 4.2   3/5/2015      12:00 AM 4.1   1/23/2015      12:00 AM 6.0   1/5/2015      11:15 AM 6.3   12/4/2014      12:10 PM 8.8 (H)   10/31/2014      11:30 AM 54.6 (H)   10/10/2014       9:55 .5 (H)   9/8/2014      12:00 AM 25,163.0 (H)   5/29/2014      12:00 AM 6,693.0 (H)   4/30/2014      12:00 .1 (H)   3/21/2014      12:00 AM 59.1 (H)   2/20/2014      11:10 AM 20.4 (H)   1/20/2014 12:00 AM 21.4 (H)   12/30/2013      10:15 AM 30.8 (H)   11/25/2013      12:00 .2 (H)   11/4/2013       1:31 PM 3,137.0 (H)           COMPREHENSIVE ROS:   Detailed positives and pertinent negatives in HPI      Past Medical History:   Diagnosis Date    Anxiety     Asthma     well controlled    [facility] ([facility]     left ovary h/o    Coagulation disorder ([facility]     Dyspepsia and other specified disorders of function of stomach     Hypokalemia 9/8/2014    Morbid obesity ([facility]     Ovarian cancer ([facility]     PIH (pregnancy induced hypertension)     Pink eye disease, left        Past Surgical History:   Procedure Laterality Date    HX ADENOIDECTOMY Bilateral 10/2005    HX OOPHORECTOMY      left    HX POLYPECTOMY  1993    rectal       Social History     Socioeconomic History    Marital status:      Spouse name: Not on file    Number of children: Not on file    Years of education: Not on file    Highest education level: Not on file   Occupational History    Not on file   Social Needs    Financial resource strain: Not on file    Food insecurity:     Worry: Not on file     Inability: Not on file    Transportation needs:     Medical: Not on file     Non-medical: Not on file   Tobacco Use    Smoking status: Never Smoker    Smokeless tobacco: Never Used   Substance and Sexual Activity    Alcohol use:  Yes     Alcohol/week: 0.0 standard drinks     Comment: 1-2 mixed drinks monthly    Drug use: No    Sexual activity: Yes     Partners: Male     Birth control/protection: Pill   Lifestyle    Physical activity:     Days per week: Not on file     Minutes per session: Not on file    Stress: Not on file   Relationships    Social connections:     Talks on phone: Not on file     Gets together: Not on file     Attends Anabaptist service: Not on file     Active member of club or organization: Not on file     Attends meetings of clubs or organizations: Not on file     Relationship status: Not on file   [facility] Intimate partner violence:     Fear of current or ex partner: Not on file     Emotionally abused: Not on file     Physically abused: Not on file     Forced sexual activity: Not on file   Other Topics Concern    Not on file   Social History Narrative    Not on file       Family History   Problem Relation Age of Onset    Cancer Father     Cancer Paternal Uncle        Current Outpatient Medications on File Prior to Visit   Medication Sig Dispense Refill    PARoxetine (PAXIL) 30 mg tablet Take 30 mg by mouth.  WANDA FE 1.5/30, 28, 1.5 mg-30 mcg (21)/75 mg (7) tab take 1 tablet by mouth once daily (DO NOT TAKE LAST 7 TABLETS,DISCARD AND START NEW PACK) 56 Tab 6    fluticasone (FLONASE) 50 mcg/actuation nasal spray 2 Sprays by Nasal route.  pantoprazole (PROTONIX) 20 mg tablet Take 20 mg by mouth.  venlafaxine-SR (EFFEXOR-XR) 150 mg capsule Take 150 mg by mouth.  norethindrone-ethinyl estradiol-iron (WANDA FE 1.5/30, 28,) 1.5 mg-30 mcg (21)/75 mg (7) tab take 1 tablet by mouth once daily DO NOT TAKE LAST 7 TABS,DISCARD AND START NEW PACK 56 Tab 3    phentermine (ADIPEX-P) 37.5 mg tablet Take 37.5 mg by mouth.  gabapentin (NEURONTIN) 300 mg capsule Take 300 mg by mouth three (3) times daily.  albuterol (PROAIR HFA) 90 mcg/actuation inhaler Take  by inhalation.  LORazepam (ATIVAN) 0.5 mg tablet Take 1 Tab by mouth every twelve (12) hours as needed for Anxiety. Max Daily Amount: 1 mg. 60 Tab 0    ibuprofen (MOTRIN) 800 mg tablet Take 1 tablet by mouth every six (6) hours as needed for Pain. 90 tablet 1     No current facility-administered medications on file prior to visit. Allergies   Allergen Reactions    Penicillins Hives       OBJECTIVE:  PHYSICAL EXAM  VITAL SIGNS: There were no vitals taken for this visit.    GENERAL ANDREW: Well nourished, well developed, no acute distress   HEENT: NCAT, EOMI, PERRL    RESPIRATORY: Lungs CTA bilaterally, no wheezing, rhonchi, or crackles CARDIOVASC: RRR, S1 and S2 present, no murmurs, rubs, or gallop    GASTROINT: Soft, nontender, nondistended, NABS, no masses    MUSCULOSKEL: no joint tenderness, deformity or swelling    INTEGUMENT: no rash or abnormalities    EXTREMITIES: extremities normal, atraumatic, no cyanosis or edema    PELVIC: VULVA: normal appearing vulva with no masses, tenderness or lesions  VAGINA: normal appearing vagina, atrophic  CERVIX: normal appearing cervix, some blood at os   UTERUS: uterus is normal size, shape, consistency and nontender  ADNEXA: surgically absent bilateral.   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 Neuropathy, chemo induced, improved. Encouraged B6 and B12 use. Encourage patient to take Neurontin as prescribed or discuss with PCP. Hot flashes. Continue OCP. AFP and CA-125 WNL. Continue every 12 months  Will need pap at next visit  Patient verbalizes understanding of information provided today and agrees with plan of care    Total time spent was 25 minutes regarding diagnosis of ovarian cancer and >50% of this time was spent counseling and coordinating care.     Tianna Story DO  Gynecologic Oncology  12/12/2019/10:30 AM

## 2019-12-13 ENCOUNTER — OFFICE VISIT (OUTPATIENT)
Dept: ONCOLOGY | Age: 29
End: 2019-12-13

## 2019-12-13 VITALS
HEART RATE: 88 BPM | HEIGHT: 62 IN | BODY MASS INDEX: 41.04 KG/M2 | WEIGHT: 223 LBS | RESPIRATION RATE: 12 BRPM | SYSTOLIC BLOOD PRESSURE: 144 MMHG | TEMPERATURE: 98 F | DIASTOLIC BLOOD PRESSURE: 97 MMHG | OXYGEN SATURATION: 100 %

## 2019-12-13 DIAGNOSIS — C56.2 MALIGNANT GERM CELL TUMOR OF LEFT OVARY (HCC): Primary | ICD-10-CM

## 2019-12-13 RX ORDER — FLUCONAZOLE 150 MG/1
150 TABLET ORAL DAILY
Qty: 1 TAB | Refills: 2 | Status: SHIPPED | OUTPATIENT
Start: 2019-12-13 | End: 2019-12-14

## 2019-12-13 NOTE — LETTER
12/13/19 Patient: Reji Castro YOB: 1990 Date of Visit: 12/13/2019 Mila Treviño MD 
[address] [facility] [address] [place] [address] VIA Facsimile: [phone] Dear Mila Treviño MD, Thank you [facility]. My notes for this consultation are attached. If you have questions, please do not hesitate to call me. I look forward to following your patient along with you. Sincerely, Rita Ludn MD

## 2019-12-13 NOTE — PROGRESS NOTES
Miki Starkey is a 26 y.o. 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 Pt c/o vaginal irritation and itching. Do you have any changes in your bowel movements? No  Have you been experiencing nausea or vomiting? No  Have you been experiencing any continuous or worsening abdominal pain? No  Any urinary burning? No    Visit Vitals  BP (!) 144/97 (BP 1 Location: Right arm, BP Patient Position: Sitting)   Pulse 88   Temp 98 °F (36.7 °C) (Oral)   Resp 12   Ht 5' 2\" (1.575 m)   Wt 101.2 kg (223 lb)   SpO2 100%   BMI 40.79 kg/m²         Health Maintenance Due   Topic Date Due    DTaP/Tdap/Td series (1 - Tdap) 09/07/2000    Influenza Age 5 to Adult  08/01/2019         1. Have you been to the ER, urgent care clinic since your last visit? Hospitalized since your last visit? No    2. Have you seen or consulted any other health care providers outside of the [address] [place] [address] since your last visit? Include any pap smears or colon screening. No    3 most recent PHQ Screens 12/6/2018   Little interest or pleasure in doing things Not at all   Feeling down, depressed, irritable, or hopeless Not at all   Total Score PHQ 2 0       Abuse Screening Questionnaire 11/7/2017   Do you ever feel afraid of your partner? N   Are you in a relationship with someone who physically or mentally threatens you? N   Is it safe for you to go home? Y       No flowsheet data found.     Learning Assessment 11/7/2017   PRIMARY LEARNER Patient   HIGHEST LEVEL OF EDUCATION - PRIMARY LEARNER  2 YEARS OF COLLEGE   BARRIERS PRIMARY LEARNER NONE   CO-LEARNER CAREGIVER No   PRIMARY LANGUAGE ENGLISH   LEARNER PREFERENCE PRIMARY DEMONSTRATION   ANSWERED BY patient   RELATIONSHIP SELF

## 2019-12-13 NOTE — PATIENT INSTRUCTIONS
Ovarian Cancer: Care Instructions  Your Care Instructions  Ovarian cancer occurs when abnormal cells grow out of control in or near your ovaries. These cells may spread to other areas in the body. The ovaries are the organs that hold and release a woman's eggs. Treatment usually involves surgery to remove the ovaries. Chemotherapy may also be used. Radiation therapy is rarely used, but in some cases it might be part of treatment. You also may get medicines to help with the side effects of chemotherapy, which may include nausea, vomiting, and fatigue. Finding out that you have cancer is scary. You may feel many emotions and may need some help coping. Seek out family, friends, and counselors for support. You can do things at home to make yourself feel better while you go through treatment. You also can call the [employer] (9-322.728.5947) or visit its website at [address] for more information. Follow-up care is a key part of your treatment and safety. Be sure to make and go to all appointments, and call your doctor if you are having problems. It's also a good idea to know your test results and keep a list of the medicines you take. How can you care for yourself at home? · Take your medicines exactly as prescribed. Call your doctor if you think you are having a problem with your medicine. You may get medicine for nausea and vomiting if you have these side effects. · Eat healthy food. If you do not feel like eating, try to eat food that has protein and extra calories to keep up your strength and prevent weight loss. Drink liquid meal replacements for extra calories and protein. Try to eat your main meal early. Eating smaller portions more often may help as well. · Get some physical activity every day, but do not get too tired. Keep doing the hobbies you enjoy as your energy allows. · Take steps to control your stress and workload. Learn relaxation techniques. ? Share your feelings.  Stress and tension affect our emotions. By expressing your feelings to others, you may be able to understand and cope with them. ? Consider joining a support group. Talking about a problem with your spouse, a good friend, or other people with similar problems is a good way to reduce tension and stress. ? Express yourself through art. Try writing, dance, art, or crafts to relieve tension. Some dance, writing, or art groups may be available just for people who have cancer. ? Be kind to your body and mind. Getting enough sleep, eating a healthy diet, and taking time to do things you enjoy can contribute to an overall feeling of balance in your life and help reduce stress. ? Get help if you need it. Discuss your concerns with your doctor or counselor. · If you are vomiting or have diarrhea:  ? Drink plenty of fluids (enough so that your urine is light yellow or clear like water) to prevent dehydration. Choose water and other caffeine-free clear liquids. If you have kidney, heart, or liver disease and have to limit fluids, talk with your doctor before you increase the amount of fluids you drink. ? When you are able to eat, try clear soups, mild foods, and liquids until all symptoms are gone for 12 to 48 hours. Other good choices include dry toast, crackers, cooked cereal, and gelatin dessert, such as Jell-O.  · Take care of your urinary tract to prevent problems such as infection, which can be caused by ovarian cancer and its treatment. Limit drinks with caffeine, drink plenty of fluids, and urinate every 3 or 4 hours. · If you have not already done so, prepare a list of advance directives. Advance directives are instructions to your doctor and family members about what kind of care you want if you become unable to speak or express yourself. When should you call for help? Call 911 anytime you think you may need emergency care.  For example, call if:    · You passed out (lost consciousness).    Call your doctor now or seek immediate medical care if:    · You have a fever.     · You have abnormal bleeding.     · You have new or worse pain.     · You think you have an infection.     · You have new symptoms, such as a cough, belly pain, vomiting, diarrhea, or a rash.    Watch closely for changes in your health, and be sure to contact your doctor if:    · You are much more tired than usual.     · You have swollen glands in your armpits, groin, or neck.     · You do not get better as expected. Where can you learn more? Go to http://arlene-cheryl.info/. Enter D145 in the search box to learn more about \"Ovarian Cancer: Care Instructions. \"  Current as of: December 19, 2018  Content Version: 12.2  © 6197-8155 [employer]. Care instructions adapted under license by [employer] (which disclaims liability or warranty for this information). If you have questions about a medical condition or this instruction, always ask your healthcare professional. Jose Armando Coxhe any warranty or liability for your use of this information.

## 2020-03-21 RX ORDER — NORETHINDRONE ACETATE AND ETHINYL ESTRADIOL 1.5-30(21)
KIT ORAL
Qty: 84 TAB | Refills: 0 | Status: SHIPPED | OUTPATIENT
Start: 2020-03-21 | End: 2020-05-19

## 2020-05-18 DIAGNOSIS — C56.2 MALIGNANT GERM CELL TUMOR OF LEFT OVARY (HCC): Primary | ICD-10-CM

## 2020-05-19 RX ORDER — NORETHINDRONE ACETATE AND ETHINYL ESTRADIOL 1.5-30(21)
KIT ORAL
Qty: 84 TAB | Refills: 4 | Status: SHIPPED | OUTPATIENT
Start: 2020-05-19 | End: 2021-01-13 | Stop reason: SDUPTHER

## 2020-05-19 NOTE — TELEPHONE ENCOUNTER
Spoke with patient and confirmed need for refill. Patient verbalized understanding and agreed to plan. Patient instructed to contact office for any question or concerns.        Edward Beckett NP

## 2020-07-09 ENCOUNTER — VIRTUAL VISIT (OUTPATIENT)
Dept: FAMILY MEDICINE CLINIC | Age: 30
End: 2020-07-09

## 2020-07-09 DIAGNOSIS — F41.9 ANXIETY: Primary | ICD-10-CM

## 2020-07-09 DIAGNOSIS — R63.5 WEIGHT GAIN: ICD-10-CM

## 2020-07-09 RX ORDER — BUSPIRONE HYDROCHLORIDE 5 MG/1
5 TABLET ORAL 2 TIMES DAILY
Qty: 60 TAB | Refills: 1 | Status: SHIPPED | OUTPATIENT
Start: 2020-07-09 | End: 2021-01-12

## 2020-07-09 NOTE — PROGRESS NOTES
Elbert Palomares is a 26 y.o. female who was seen by synchronous (real-time) audio-video technology on 7/9/2020 for Anxiety and Weight Gain        Assessment & Plan:   Diagnoses and all orders for this visit:    1. Anxiety    2. Weight gain    Other orders  -     busPIRone (BUSPAR) 5 mg tablet; Take 1 Tab by mouth two (2) times a day. discussed lifestyle changes for weight  I spent at least 20 minutes on this visit with this new patient. 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 States she does wear her facemask and also washes her hands regularly. Comments she will be tested today or tomorrow. Comments resident that has tested positive is in quarantine. Patient reports she has increased anxiety since being dx with ovarian cancer 5 years ago. Currently taking effexor for hot flashes but this has not been effective for her anxiety. Also has taken ativan previously with improvement. Weight: reports she has not been attempting to make lifestyle changes at this time to help with weight. Prior to Admission medications    Medication Sig Start Date End Date Taking? Authorizing Provider   Benjamin Fe 1.5/30, 28, 1.5 mg-30 mcg (21)/75 mg (7) tab TAKE ONE TABLET BY MOUTH DAILY *DO NOT TAKE THE LAST 7 TABLETS, DISCARD AND START NEW PACK* 5/19/20  Yes Jessica Sanders, MARIO   fluticasone (FLONASE) 50 mcg/actuation nasal spray 2 Sprays by Nasal route. 7/25/17  Yes Provider, Historical   gabapentin (NEURONTIN) 300 mg capsule Take 300 mg by mouth three (3) times daily. Yes Provider, Historical   albuterol (PROAIR HFA) 90 mcg/actuation inhaler Take  by inhalation. Yes Provider, Historical   PARoxetine (PAXIL) 30 mg tablet Take 30 mg by mouth.  3/12/19 7/9/20  Provider, Historical   pantoprazole (PROTONIX) 20 mg tablet Take 20 mg by mouth. 10/10/18   Provider, Historical   venlafaxine-SR (EFFEXOR-XR) 150 mg capsule Take 150 mg by mouth. 10/10/18   Provider, Historical   phentermine (ADIPEX-P) 37.5 mg tablet Take 37.5 mg by mouth. 7/25/17   Provider, Historical   LORazepam (ATIVAN) 0.5 mg tablet Take 1 Tab by mouth every twelve (12) hours as needed for Anxiety. Max Daily Amount: 1 mg. 2/9/15   Rick Gaming MD   ibuprofen (MOTRIN) 800 mg tablet Take 1 tablet by mouth every six (6) hours as needed for Pain.  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 ([address] C56.2    IUP (intrauterine pregnancy), incidental Z33.1    Ovarian cancer ([address] C56.9    Ileus, postoperative ([address] K91.89, K56.7    Breast engorgement, postpartum O92.79    Hypokalemia E87.6    Neuropathy due to chemotherapeutic drug ([address] G62.0, T45.1X5A    Tinnitus H93.19    Fatigue R53.83    Indigestion K30    SOB (shortness of breath) R06.02    Arthralgia M25.50    Anxiety F41.9    Stress F43.9    Insomnia G47.00    Post-menopausal bleeding N95.0    Vaginal bleeding N93.9    Vaginal candidiasis B37.3    Obesity, morbid ([facility]) E66.01     Patient Active Problem List    Diagnosis Date Noted    Obesity, morbid ([address] 05/07/2018    Vaginal candidiasis 07/18/2016    Post-menopausal bleeding 05/01/2015    Vaginal bleeding 05/01/2015    SOB (shortness of breath) 02/09/2015    Arthralgia 02/09/2015    Anxiety 02/09/2015    Stress 02/09/2015    Insomnia 02/09/2015    Indigestion 12/04/2014    Neuropathy due to chemotherapeutic drug ([address] 10/10/2014    Tinnitus 10/10/2014    Fatigue 10/10/2014    Hypokalemia 09/08/2014    Ileus, postoperative ([address] 09/04/2014    Breast engorgement, postpartum 09/04/2014    Ovarian cancer ([facility] 08/28/2014    IUP (intrauterine pregnancy), incidental 07/15/2014    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  fluticasone (FLONASE) 50 mcg/actuation nasal spray 2 Sprays by Nasal route.  pantoprazole (PROTONIX) 20 mg tablet Take 20 mg by mouth.  venlafaxine-SR (EFFEXOR-XR) 150 mg capsule Take 150 mg by mouth.  phentermine (ADIPEX-P) 37.5 mg tablet Take 37.5 mg by mouth.  gabapentin (NEURONTIN) 300 mg capsule Take 300 mg by mouth three (3) times daily.  albuterol (PROAIR HFA) 90 mcg/actuation inhaler Take  by inhalation.  LORazepam (ATIVAN) 0.5 mg tablet Take 1 Tab by mouth every twelve (12) hours as needed for Anxiety. Max Daily Amount: 1 mg. 60 Tab 0    ibuprofen (MOTRIN) 800 mg tablet Take 1 tablet by mouth every six (6) hours as needed for Pain.  90 tablet 1     Allergies   Allergen Reactions    Penicillins Hives     Past Medical History:   Diagnosis Date    Anxiety     Asthma     well controlled    Cancer ([address]     left ovary h/o    Coagulation disorder ([address]     Dyspepsia and other specified disorders of function of stomach     Hypokalemia 9/8/2014    Morbid obesity ([address]     Ovarian cancer ([address]     PIH (pregnancy induced hypertension)     Pink eye disease, left      Past Surgical History:   Procedure Laterality Date    HX ADENOIDECTOMY Bilateral 10/2005    HX OOPHORECTOMY      left    HX POLYPECTOMY  1993    rectal     Family History   Problem Relation Age of Onset    Cancer Father     Cancer Paternal Uncle      Social History     Tobacco Use    Smoking status: Never Smoker    Smokeless tobacco: Never Used   Substance Use Topics    Alcohol use: Yes     Alcohol/week: 0.0 standard drinks     Comment: 1-2 mixed drinks monthly       Review of Systems   Psychiatric/Behavioral: The patient is nervous/anxious. Objective:   No flowsheet data found. General: alert, cooperative, no distress   Mental  status: normal mood, behavior, speech, dress, motor activity, and thought processes, able to follow commands   HENT: NCAT   Neck: no visualized mass   Resp: no respiratory distress   Neuro: no gross deficits   Skin: no discoloration or lesions of concern on visible areas   Psychiatric: normal affect, consistent with stated mood, no evidence of hallucinations     Additional exam findings: We discussed the expected course, resolution and complications of the diagnosis(es) in detail. Medication risks, benefits, costs, interactions, and alternatives were discussed as indicated. I advised her to contact the office if her condition worsens, changes or fails to improve as anticipated. She expressed understanding with the diagnosis(es) and plan. Hank Kruse, who was evaluated through a patient-initiated, synchronous (real-time) audio-video encounter, and/or her healthcare decision maker, is aware that it is a billable service, with coverage as determined by her insurance carrier. She provided verbal consent to proceed: Yes, and patient identification was verified. It was conducted pursuant to the emergency declaration under the [address] [place] [address] authority and the [employer] Act. A caregiver was present when appropriate. Ability to conduct physical exam was limited. I was in the office. The patient was at home.       Katiana Tipton NP

## 2020-09-10 ENCOUNTER — VIRTUAL VISIT (OUTPATIENT)
Dept: FAMILY MEDICINE CLINIC | Age: 30
End: 2020-09-10

## 2020-09-10 DIAGNOSIS — E66.01 OBESITY, CLASS III, BMI 40-49.9 (MORBID OBESITY) (HCC): Primary | ICD-10-CM

## 2020-09-10 DIAGNOSIS — C56.2 OVARIAN CANCER ON LEFT (HCC): ICD-10-CM

## 2020-09-10 RX ORDER — IBUPROFEN 800 MG/1
800 TABLET ORAL
Qty: 90 TAB | Refills: 1 | Status: SHIPPED | OUTPATIENT
Start: 2020-09-10

## 2020-09-10 RX ORDER — PHENTERMINE HYDROCHLORIDE 37.5 MG/1
37.5 TABLET ORAL
Qty: 30 TAB | Refills: 0 | Status: SHIPPED | OUTPATIENT
Start: 2020-09-10 | End: 2021-01-12 | Stop reason: SDUPTHER

## 2020-12-29 ENCOUNTER — TELEPHONE (OUTPATIENT)
Dept: FAMILY MEDICINE CLINIC | Age: 30
End: 2020-12-29

## 2020-12-29 NOTE — TELEPHONE ENCOUNTER
Pt called into office at 11:35, asked abt appt type stating needed directions for vv, once advised that appt was ov she stated state it would be 12:00 before she could get here and demanded that she still be seen, adv that would have to reschedule. Pt became irate and stated that it is the office fault that because she has always had virtual appts, tried to explain but unsure bc patient originally asked for instructions for vv. Offered to reschedule appt pt declined and demanded to be placed on same day at end of the day, adv none available, offered also to send mssg to nurse and provider, pt upset and wanted me to get them from current pt room to speak to them, again offered to send mssg, pt demand to speak to whomever was over me, adv that would have to sned message bc not available at this exact moment, stated not acceptable. pt became more irate and began to yell asked pt to calm and could place on hold while I sent mssg or call would have to end. Pt hung up while noting chart.  Please adv [phone]

## 2021-01-08 ENCOUNTER — HOSPITAL ENCOUNTER (OUTPATIENT)
Dept: LAB | Age: 31
Discharge: HOME | End: 2021-01-08

## 2021-01-08 LAB — SENTARA SPECIMEN COL,SENBCF: NORMAL

## 2021-01-08 PROCEDURE — 99001 SPECIMEN HANDLING PT-LAB: CPT

## 2021-01-12 ENCOUNTER — HOSPITAL ENCOUNTER (OUTPATIENT)
Dept: LAB | Age: 31
Discharge: HOME | End: 2021-01-12
Payer: COMMERCIAL

## 2021-01-12 ENCOUNTER — OFFICE VISIT (OUTPATIENT)
Dept: FAMILY MEDICINE CLINIC | Age: 31
End: 2021-01-12
Payer: COMMERCIAL

## 2021-01-12 VITALS
TEMPERATURE: 97.8 F | WEIGHT: 227 LBS | HEIGHT: 62 IN | BODY MASS INDEX: 41.77 KG/M2 | SYSTOLIC BLOOD PRESSURE: 140 MMHG | OXYGEN SATURATION: 98 % | RESPIRATION RATE: 18 BRPM | DIASTOLIC BLOOD PRESSURE: 90 MMHG | HEART RATE: 99 BPM

## 2021-01-12 DIAGNOSIS — E66.01 OBESITY, CLASS III, BMI 40-49.9 (MORBID OBESITY) (HCC): ICD-10-CM

## 2021-01-12 DIAGNOSIS — R03.0 ELEVATED BLOOD PRESSURE READING: ICD-10-CM

## 2021-01-12 DIAGNOSIS — J45.30 MILD PERSISTENT ASTHMA WITHOUT COMPLICATION: Primary | ICD-10-CM

## 2021-01-12 LAB
ALBUMIN SERPL-MCNC: 3.5 G/DL (ref 3.4–5)
ALBUMIN/GLOB SERPL: 0.8 {RATIO} (ref 0.8–1.7)
ALP SERPL-CCNC: 64 U/L (ref 45–117)
ALT SERPL-CCNC: 69 U/L (ref 13–56)
ANION GAP SERPL CALC-SCNC: 8 MMOL/L (ref 3–18)
AST SERPL-CCNC: 24 U/L (ref 10–38)
BILIRUB SERPL-MCNC: 0.4 MG/DL (ref 0.2–1)
BUN SERPL-MCNC: 19 MG/DL (ref 7–18)
BUN/CREAT SERPL: 20 (ref 12–20)
CALCIUM SERPL-MCNC: 9.2 MG/DL (ref 8.5–10.1)
CHLORIDE SERPL-SCNC: 105 MMOL/L (ref 100–111)
CHOLEST SERPL-MCNC: 209 MG/DL
CO2 SERPL-SCNC: 26 MMOL/L (ref 21–32)
CREAT SERPL-MCNC: 0.94 MG/DL (ref 0.6–1.3)
EST. AVERAGE GLUCOSE BLD GHB EST-MCNC: 131 MG/DL
GLOBULIN SER CALC-MCNC: 4.4 G/DL (ref 2–4)
GLUCOSE SERPL-MCNC: 85 MG/DL (ref 74–99)
HBA1C MFR BLD: 6.2 % (ref 4.2–5.6)
HDLC SERPL-MCNC: 91 MG/DL (ref 40–60)
HDLC SERPL: 2.3 {RATIO} (ref 0–5)
LDLC SERPL CALC-MCNC: 105.8 MG/DL (ref 0–100)
LIPID PROFILE,FLP: ABNORMAL
POTASSIUM SERPL-SCNC: 3.9 MMOL/L (ref 3.5–5.5)
PROT SERPL-MCNC: 7.9 G/DL (ref 6.4–8.2)
SODIUM SERPL-SCNC: 139 MMOL/L (ref 136–145)
TRIGL SERPL-MCNC: 61 MG/DL (ref ?–150)
VLDLC SERPL CALC-MCNC: 12.2 MG/DL

## 2021-01-12 PROCEDURE — 80061 LIPID PANEL: CPT

## 2021-01-12 PROCEDURE — 80053 COMPREHEN METABOLIC PANEL: CPT

## 2021-01-12 PROCEDURE — 99214 OFFICE O/P EST MOD 30 MIN: CPT | Performed by: NURSE PRACTITIONER

## 2021-01-12 PROCEDURE — 83036 HEMOGLOBIN GLYCOSYLATED A1C: CPT

## 2021-01-12 RX ORDER — CHLORTHALIDONE 25 MG/1
25 TABLET ORAL DAILY
Qty: 30 TAB | Refills: 1 | Status: SHIPPED | OUTPATIENT
Start: 2021-01-12 | End: 2021-02-28

## 2021-01-12 RX ORDER — MONTELUKAST SODIUM 10 MG/1
10 TABLET ORAL DAILY
Qty: 30 TAB | Refills: 2 | Status: SHIPPED | OUTPATIENT
Start: 2021-01-12 | End: 2021-04-30

## 2021-01-12 RX ORDER — FLUTICASONE PROPIONATE AND SALMETEROL 250; 50 UG/1; UG/1
1 POWDER RESPIRATORY (INHALATION) EVERY 12 HOURS
Qty: 60 EACH | Refills: 2 | Status: SHIPPED | OUTPATIENT
Start: 2021-01-12

## 2021-01-12 RX ORDER — PHENTERMINE HYDROCHLORIDE 37.5 MG/1
37.5 TABLET ORAL
Qty: 30 TAB | Refills: 0 | Status: SHIPPED | OUTPATIENT
Start: 2021-01-12 | End: 2021-02-10 | Stop reason: SDUPTHER

## 2021-01-12 NOTE — PATIENT INSTRUCTIONS
Learning About Asthma Triggers What are triggers? When you have asthma, certain things can make your symptoms worse. These are called triggers. They include: · Cigarette smoke or air pollution. · Things you are allergic to, such as: 
¨ Pollen, mold, or dust mites. ¨ Pet hair, skin, or saliva. · Illnesses, like colds, flu, or pneumonia. · Exercise. · Dry, cold air. How do triggers affect asthma? Triggers can make it harder for your lungs to work as they should and can lead to sudden difficulty breathing and other symptoms. When you are around a trigger, an asthma attack is more likely. If your symptoms are severe, you may need emergency treatment or have to go to the hospital for treatment. If you know what your triggers are and can avoid them, you may be able to prevent asthma attacks, reduce how often you have them, and make them less severe. What can you do to avoid triggers? The first thing is to know your triggers. When you are having symptoms, note the things around you that might be causing them. Then look for patterns in what may be triggering your symptoms. Record your triggers on a piece of paper or in an asthma diary. When you have your list of possible triggers, work with your doctor to find ways to avoid them. You also can check how well your lungs are working by measuring your peak expiratory flow (PEF) throughout the day. Your PEF may drop when you are near things that trigger symptoms. Here are some ways to avoid a few common triggers. · Do not smoke or allow others to smoke around you. If you need help quitting, talk to your doctor about stop-smoking programs and medicines. These can increase your chances of quitting for good. · If there is a lot of pollution, pollen, or dust outside, stay at home and keep your windows closed. Use an air conditioner or air filter in your home. Check your local weather report or newspaper for air quality and pollen reports. · Get a flu shot every year. Talk to your doctor about getting a pneumococcal shot. Wash your hands often to prevent infections. · Avoid exercising outdoors in cold weather. If you are outdoors in cold weather, wear a scarf around your face and breathe through your nose. How can you manage an asthma attack? · If you have an asthma action plan, follow the plan. In general: ¨ Use your quick-relief inhaler as directed by your doctor. If your symptoms do not get better after you use your medicine, have someone take you to the emergency room. Call an ambulance if needed. ¨ If your doctor has given you other inhaled medicines or steroid pills, take them as directed. Where can you learn more? Go to [employer] Enter U052 in the search box to learn more about \"Learning About Asthma Triggers. \"  
© 4573-4226 [employer]. Care instructions adapted under license by [employer] (which disclaims liability or warranty for this information). This care instruction is for use with your licensed healthcare professional. If you have questions about a medical condition or this instruction, always ask your healthcare professional. [place] [address] any warranty or liability for your use of this information. Content Version: 26.3.904147; Last Revised: February 23, 2012 Controlling Your Asthma: Care Instructions Your Care Instructions Asthma is a long-term condition that affects your breathing. It causes the airways that lead to the lungs to swell. During an asthma attack, the airways swell and narrow. This makes it hard to breathe. You may wheeze or cough. If you have a bad attack, you may need emergency care. There are two things to do to treat asthma. · Control asthma over the long term. · Treat attacks when they occur. You and your doctor can make an asthma action plan. It tells you what medicines you need to take every day to control asthma symptoms and what to do if you have an asthma attack. Your asthma action plan can help prevent and treat attacks. When you keep your asthma under control, you can prevent severe attacks and lasting damage to your airways. You need to treat your asthma even when you are not having symptoms. Although asthma is a lifelong disease, treatment can help control it and help you stay healthy. Follow-up care is a key part of your treatment and safety. Be sure to make and go to all appointments, and call your doctor if you are having problems. It's also a good idea to know your test results and keep a list of the medicines you take. How can you care for yourself at home? To control asthma over the long term Medicines Controller medicines reduce swelling in your lungs. They also prevent asthma attacks. Take your controller medicine exactly as prescribed. Talk to your doctor if you have any problems with your medicine. · Inhaled corticosteroid is a common and effective controller medicine. Using it the right way can prevent or reduce most side effects. · Take your controller medicine every day, not just when you have symptoms. It helps prevent problems before they occur. · Your doctor may prescribe another medicine that you use along with the corticosteroid. This is often a long-acting bronchodilator. Do not take this medicine by itself. Using a long-acting bronchodilator by itself can increase your risk of a severe or fatal asthma attack. · Do not take inhaled corticosteroids or long-acting bronchodilators to stop an asthma attack that has already started. They don't work fast enough to help. · Talk to your doctor before you use other medicines. Some medicines, such as aspirin, can cause asthma attacks in some people. Education · Learn what triggers an asthma attack. Avoid these triggers when you can. Common triggers include colds, smoke, air pollution, dust, pollen, mold, pets, cockroaches, stress, and cold air. · Check yourself for asthma symptoms to know which step to follow in your action plan. Watch for things like being short of breath, having chest tightness, coughing, and wheezing. Also notice if symptoms wake you up at night or if you get tired quickly when you exercise. · If you have a peak flow meter, use it to check how well you are breathing. It can help you know when an asthma attack is going to occur. Then you can take medicine to prevent the asthma attack or make it less severe. · Do not smoke or allow others to smoke around you. Avoid smoky places. Smoking makes asthma worse. If you need help quitting, talk to your doctor about stop-smoking programs and medicines. These can increase your chances of quitting for good. · Avoid colds and the flu. Get a pneumococcal vaccine shot. If you have had one before, ask your doctor whether you need a second dose. Get a flu vaccine every year, as soon as it's available. If you must be around people with colds or the flu, wash your hands often. To treat attacks when they occur Use your asthma action plan when you have an attack. Your quick-relief medicine will stop an asthma attack. It relaxes the muscles that get tight around the airways. If your doctor prescribed corticosteroid pills to use during an attack, take them as directed. They may take hours to work, but they may shorten the attack and help you breathe better. · Albuterol is an effective quick-relief inhaler. · Take your quick-relief medicine exactly as prescribed. · Always bring your asthma medicine with you when you travel. · You may need to use quick-relief medicine before you exercise. · Call your doctor if you think you are having a problem with your medicine. When should you call for help? Call 911 anytime you think you may need emergency care. For example, call if: 
  · You are having severe trouble breathing. Call your doctor now or seek immediate medical care if: 
  · Your symptoms do not get better after you have followed your asthma action plan.  
  · You cough up yellow, dark brown, or bloody mucus (sputum). Watch closely for changes in your health, and be sure to contact your doctor if: 
  · Your coughing and wheezing get worse.  
  · You need to use your quick-relief medicine on more than 2 days a week (unless it is just for exercise).  
  · You need help figuring out what is triggering your asthma attacks. Where can you learn more? Go to http://www.gray.com/ Enter C260 in the search box to learn more about \"Controlling Your Asthma: Care Instructions. \" Current as of: February 24, 2020               Content Version: 12.6 © 1533-0790 [employer]. Care instructions adapted under license by [employer] (which disclaims liability or warranty for this information). If you have questions about a medical condition or this instruction, always ask your healthcare professional. [address] any warranty or liability for your use of this information.

## 2021-01-12 NOTE — PROGRESS NOTES
Remington Hernandez is a 31 y.o. female who was seen in clinic today (1/12/2021) for Weight Gain (Patient requesting medication for weight loss) and ED Follow-up (seen at [facility] on 1/7/2021 diagnosed with asthma exacerbation. given prednisone and albuterol inhaler. COVID test negative.)    Assessment & Plan:   Diagnoses and all orders for this visit:    1. Mild persistent asthma without complication    2. Obesity, Class III, BMI 40-49.9 (morbid obesity) ([facility])  -     phentermine (ADIPEX-P) 37.5 mg tablet; Take 1 Tab by mouth every morning. Max Daily Amount: 37.5 mg.  -     METABOLIC PANEL, COMPREHENSIVE; Future  -     HEMOGLOBIN A1C WITH EAG; Future  -     LIPID PANEL; Future    3. Elevated blood pressure reading    Other orders  -     montelukast (SINGULAIR) 10 mg tablet; Take 1 Tab by mouth daily. -     fluticasone propion-salmeteroL (ADVAIR/WIXELA) 250-50 mcg/dose diskus inhaler; Take 1 Puff by inhalation every twelve (12) hours. -     chlorthalidone (HYGROTON) 25 mg tablet; Take 1 Tab by mouth daily. I have discussed the diagnosis with the patient and the intended plan as seen in the above orders. The patient has received an after-visit summary and questions were answered concerning future plans. I have discussed medication side effects and warnings with the patient as well. Patient agreeable with above plan and verbalizes understanding. Follow-up and Dispositions    · Return in about 4 weeks (around 2/9/2021) for weight, virtual follow up. Subjective:   Cardiovascular ROS: no TIA's, no chest pain on exertion, no dyspnea on exertion, no swelling of ankles. Patient states she has noticed she was having asthma flare with the weather change. Comments she was treated for asthma excarbation at [facility] on 1/7/2021 and treated with prednisone and albuterol. States she has pnd. States she has been on singulair in the past with improvement. Also was on advair previously.     Continues to have anxiety with work related stressors. 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 States effexor has not been effective for anxiety but does work very well for her hot flashes which way she was originally prescribed medication. Results for orders placed or performed during the hospital encounter of 01/08/21   [facility]. Result Value Ref Range    [facility] Specimens collected/[facility] to [facility]        Prior to Admission medications    Medication Sig Start Date End Date Taking? Authorizing Provider   ibuprofen (MOTRIN) 800 mg tablet Take 1 Tab by mouth every six (6) hours as needed for Pain. 9/10/20  Yes Louis Quinn NP   [place] Fe 1.5/30, 28, 1.5 mg-30 mcg (21)/75 mg (7) tab TAKE ONE TABLET BY MOUTH DAILY *DO NOT TAKE THE LAST 7 TABLETS, DISCARD AND START NEW PACK* 5/19/20  Yes Georgina Maynard NP   pantoprazole (PROTONIX) 20 mg tablet Take 20 mg by mouth. 10/10/18  Yes Provider, Historical   venlafaxine-SR (EFFEXOR-XR) 150 mg capsule Take 150 mg by mouth. 10/10/18  Yes Provider, Historical   gabapentin (NEURONTIN) 300 mg capsule Take 300 mg by mouth three (3) times daily. Yes Provider, Historical   albuterol (PROAIR HFA) 90 mcg/actuation inhaler Take  by inhalation. Yes Provider, Historical   LORazepam (ATIVAN) 0.5 mg tablet Take 1 Tab by mouth every twelve (12) hours as needed for Anxiety. Max Daily Amount: 1 mg. 2/9/15  Yes Shaun Goodwin MD   phentermine (ADIPEX-P) 37.5 mg tablet Take 1 Tab by mouth every morning. Max Daily Amount: 37.5 mg. 9/10/20   Carlitos BLOOM NP   busPIRone (BUSPAR) 5 mg tablet Take 1 Tab by mouth two (2) times a day. 7/9/20   Carlitos BLOOM NP   fluticasone (FLONASE) 50 mcg/actuation nasal spray 2 Sprays by Nasal route. 7/25/17   Provider, Historical       The following sections were reviewed & updated as appropriate: PMH, PSH, FH, and SH.     Review of Systems   Constitutional: Negative for chills and fever. Respiratory: Negative for shortness of breath. Cardiovascular: Negative for chest pain and palpitations. Neurological: Negative for dizziness and headaches. Objective:     Visit Vitals  BP (!) 140/90 (BP 1 Location: Left arm, BP Patient Position: Sitting)   Pulse 99   Temp 97.8 °F (36.6 °C) (Temporal)   Resp 18   Ht 5' 2\" (1.575 m)   Wt 227 lb (103 kg)   SpO2 98%   BMI 41.52 kg/m²      Physical Exam  Constitutional:       Appearance: She is well-developed. HENT:      Head: Normocephalic and atraumatic. Neck:      Musculoskeletal: Normal range of motion and neck supple. Cardiovascular:      Rate and Rhythm: Normal rate and regular rhythm. Heart sounds: Normal heart sounds. No murmur. No friction rub. No gallop. Pulmonary:      Effort: Pulmonary effort is normal.      Breath sounds: Normal breath sounds. No wheezing, rhonchi or rales. Abdominal:      General: Bowel sounds are normal.      Palpations: Abdomen is soft. Tenderness: There is no abdominal tenderness. Skin:     General: Skin is warm and dry. Neurological:      Mental Status: She is alert and oriented to person, place, and time. Disclaimer: The patient understands our medical plan. Alternatives have been explained and offered. The risks, benefits and significant side effects of all medications have been reviewed. Anticipated time course and progression of condition reviewed. All questions have been addressed. She is encouraged to employ the information provided in the after visit summary, which was reviewed. Where applicable, she is instructed to call the clinic if she has not been notified either by phone or through [address] with the results of her tests or with an appointment plan for any referrals within 1 week(s). No news is not good news; it's no news. The patient  is to call if her condition worsens or fails to improve or if significant side effects are experienced.      Aspects of this note may have been generated using voice recognition software. Despite editing, there may be unrecognized errors.        Dinorah Stokes NP

## 2021-01-13 ENCOUNTER — OFFICE VISIT (OUTPATIENT)
Dept: ONCOLOGY | Age: 31
End: 2021-01-13
Payer: COMMERCIAL

## 2021-01-13 VITALS
HEIGHT: 62 IN | HEART RATE: 111 BPM | SYSTOLIC BLOOD PRESSURE: 147 MMHG | OXYGEN SATURATION: 98 % | DIASTOLIC BLOOD PRESSURE: 95 MMHG | TEMPERATURE: 97.9 F | BODY MASS INDEX: 41.26 KG/M2 | WEIGHT: 224.2 LBS

## 2021-01-13 DIAGNOSIS — C56.2 MALIGNANT GERM CELL TUMOR OF LEFT OVARY (HCC): Primary | ICD-10-CM

## 2021-01-13 PROCEDURE — 99213 OFFICE O/P EST LOW 20 MIN: CPT | Performed by: OBSTETRICS & GYNECOLOGY

## 2021-01-13 RX ORDER — NORETHINDRONE ACETATE AND ETHINYL ESTRADIOL 1.5-30(21)
KIT ORAL
Qty: 84 TAB | Refills: 4 | Status: SHIPPED | OUTPATIENT
Start: 2021-01-13 | End: 2022-01-13

## 2021-01-13 NOTE — PROGRESS NOTES
Cris Pepe is a 31 y.o. female presents in office for surveillance ovarian cancer. Chief Complaint   Patient presents with    Follow-up      Pt needs refill on birth control. Do you have any unusual vaginal bleeding, discharge or irritation? Pt c/o light bleeding. Do you have any changes in your bowel movements? no  Have you been experiencing nausea or vomiting? no  Have you been experiencing any continuous or worsening abdominal pain? no  Any urinary burning? no    Visit Vitals  BP (!) 147/95 (BP 1 Location: Right arm, BP Patient Position: Sitting)   Pulse (!) 111   Temp 97.9 °F (36.6 °C) (Oral)   Ht 5' 2\" (1.575 m)   Wt 224 lb 3.2 oz (101.7 kg)   SpO2 98%   BMI 41.01 kg/m²         1. Have you been to the ER, urgent care clinic since your last visit? Hospitalized since your last visit? No    2. Have you seen or consulted any other health care providers outside of the [address] [place] [address] since your last visit? Include any pap smears or colon screening.  No    3 most recent PHQ Screens 1/12/2021   Little interest or pleasure in doing things Not at all   Feeling down, depressed, irritable, or hopeless Not at all   Total Score PHQ 2 0   Trouble falling or staying asleep, or sleeping too much Not at all   Feeling tired or having little energy Not at all   Poor appetite, weight loss, or overeating Not at all   Feeling bad about yourself - or that you are a failure or have let yourself or your family down Not at all   Trouble concentrating on things such as school, work, reading, or watching TV Not at all   Moving or speaking so slowly that other people could have noticed; or the opposite being so fidgety that others notice Not at all   Thoughts of being better off dead, or hurting yourself in some way Not at all   PHQ 9 Score 0   How difficult have these problems made it for you to do your work, take care of your home and get along with others Not difficult at all       Abuse Screening Questionnaire 11/7/2017   Do you ever feel afraid of your partner? N   Are you in a relationship with someone who physically or mentally threatens you? N   Is it safe for you to go home? Y       No flowsheet data found.     Learning Assessment 11/7/2017   PRIMARY LEARNER Patient   HIGHEST LEVEL OF EDUCATION - PRIMARY LEARNER  2 YEARS OF COLLEGE   BARRIERS PRIMARY LEARNER NONE   CO-LEARNER CAREGIVER No   PRIMARY LANGUAGE ENGLISH   LEARNER PREFERENCE PRIMARY DEMONSTRATION   ANSWERED BY patient   RELATIONSHIP SELF

## 2021-01-13 NOTE — PROGRESS NOTES
[facility]  [address] [place] [address], [address], [address] [id number] [phone]      Patient ID:  Laith Land  [id number]   y.o. 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 Diagnosed initially 10/24/2013 with a recurrence in 2014. Pt completed Bleomycin/Etoposide/Cisplatin for recurrence 14. Her previous procedures include Oophorectomy, right, and D&C 10/24/2014. Patient is also status post Oophorectomy, left, Diagnostic Lap, Exploratory Lap, Resection of Right Pelvic Mass, Lysis of adhesions and Cystoscopy 2014. She is here today for a 1 year surveillance visit. pt has run out of OCP and having bleeding. US TRANSVAGINAL: 5/15/15    IMPRESSION:  The uterus is within normal limits.  Both ovaries have been removed    Labs:  2021  AFP: 3.7  :8.44      Component      Latest Ref Rng & Units 12/3/2019 12/3/2019           9:54 AM  9:54 AM   AFP, Serum, Tumor Marker      0.0 - 8.3 ng/mL  4.0   CA-125      1.5 - 35.0 U/mL 4      Component       Cancer Ag (CA) 125 CA-125   Latest Ref Rng & Units       0.0 - 38.1 U/mL 1.5 - 35.0 U/mL   2019      10:05 AM  6   3/28/2019      10:58 AM 7.6    10/15/2018      9:03 AM 8.2    10/26/2017      9:45 AM 7.0    2017      12:00 AM 6.8    2017      12:00 AM 6.8    10/11/2016      7:35 AM 8.1    2016      8:30 AM 9.3    6/3/2016      7:55 AM 8.4    2016      7:44 AM 7.6    2016      9:00 AM 8.1    3/15/2016      8:07 AM 8.1    2016      9:58 AM 6.5    2016      8:09 AM 7.6    2015      10:23 AM 9.0    2015      8:25 AM 9.8    10/12/2015      11:42 AM 9.0    2015      12:00 AM 8.2    2015      9:06 AM 9.1    2015      2:03 PM 10.2    2015      10:50 AM 8.7    2015      10:09 AM 8.8    2015      4:22 PM 8.3 3/5/2015      12:00 AM 8.1    1/5/2015      11:15 AM 12.8    12/12/2014      10:15 AM 13.5    11/21/2014      8:40 AM 14.2    10/31/2014      11:30 AM 11.2    9/26/2014      1:30 PM 33.6    5/7/2014      12:00 AM 11.5    1/20/2014      12:00 AM 12.6    12/30/2013      10:15 AM 11.8    11/25/2013      12:00 AM 46.0 (H)    11/4/2013      1:31 PM 82.7 (H)      Component       AFP, Serum, Tumor Marker   Latest Ref Rng & Units       0.0 - 8.3 ng/mL   6/7/2019      10:05 AM 3.8   3/28/2019      10:58 AM 4.7   10/15/2018       9:03 AM 4.9   5/4/2018      12:00 AM 4.2   2/27/2018      12:00 AM 4.3   10/26/2017       9:45 AM 4.3   7/11/2017      12:00 AM 5.0   4/25/2017      12:00 AM 3.8   2/9/2017      12:00 AM 4.2   10/11/2016       7:35 AM 4.6   7/6/2016       8:30 AM 3.9   6/3/2016       7:55 AM 3.9   5/11/2016       7:44 AM 4.1   4/14/2016       9:00 AM 3.6   3/15/2016       8:07 AM 4.2   2/17/2016       9:58 AM 3.2   1/8/2016       8:09 AM 4.1   12/2/2015      10:23 AM 5.6   11/12/2015       8:25 AM 4.7   10/12/2015      11:42 AM 4.0   9/18/2015      12:00 AM 4.3   8/4/2015       9:06 AM 4.7   7/7/2015       2:03 PM 4.0   6/5/2015      10:50 AM 4.2   5/12/2015      10:09 AM 4.1   4/6/2015       4:22 PM 4.2   3/5/2015      12:00 AM 4.1   1/23/2015      12:00 AM 6.0   1/5/2015      11:15 AM 6.3   12/4/2014      12:10 PM 8.8 (H)   10/31/2014      11:30 AM 54.6 (H)   10/10/2014       9:55 .5 (H)   9/8/2014      12:00 AM 25,163.0 (H)   5/29/2014      12:00 AM 6,693.0 (H)   4/30/2014      12:00 .1 (H)   3/21/2014      12:00 AM 59.1 (H)   2/20/2014      11:10 AM 20.4 (H)   1/20/2014      12:00 AM 21.4 (H)   12/30/2013      10:15 AM 30.8 (H)   11/25/2013      12:00 .2 (H)   11/4/2013       1:31 PM 3,137.0 (H)           COMPREHENSIVE ROS:   Detailed positives and pertinent negatives in HPI      Past Medical History:   Diagnosis Date    Anxiety     Asthma     well controlled    Cancer ([facility]     left ovary h/o    Coagulation disorder ([facility]     Dyspepsia and other specified disorders of function of stomach     Hypokalemia 9/8/2014    Morbid obesity ([facility]     [facility] ([facility]     PIH (pregnancy induced hypertension)     Pink eye disease, left        Past Surgical History:   Procedure Laterality Date    HX ADENOIDECTOMY Bilateral 10/2005    HX OOPHORECTOMY      left    HX POLYPECTOMY  1993    rectal       Social History     Socioeconomic History    Marital status:      Spouse name: Not on file    Number of children: Not on file    Years of education: Not on file    Highest education level: Not on file   Occupational History    Not on file   Social Needs    Financial resource strain: Not on file    Food insecurity     Worry: Not on file     Inability: Not on file    Transportation needs     Medical: Not on file     Non-medical: Not on file   Tobacco Use    Smoking status: Never Smoker    Smokeless tobacco: Never Used   Substance and Sexual Activity    Alcohol use:  Yes     Alcohol/week: 0.0 standard drinks     Comment: 1-2 mixed drinks monthly    Drug use: No    Sexual activity: Yes     Partners: Male     Birth control/protection: Pill   Lifestyle    Physical activity     Days per week: Not on file     Minutes per session: Not on file    Stress: Not on file   Relationships    Social connections     Talks on phone: Not on file     Gets together: Not on file     Attends Mu-ism service: Not on file     Active member of club or organization: Not on file     Attends meetings of clubs or organizations: Not on file     Relationship status: Not on file    Intimate partner violence     Fear of current or ex partner: Not on file     Emotionally abused: Not on file     Physically abused: Not on file     Forced sexual activity: Not on file   Other Topics Concern    Not on file   Social History Narrative    Not on file       Family History   Problem Relation Age of Onset    Cancer Father    Erika Cancer Paternal Uncle        Current Outpatient Medications on File Prior to Visit   Medication Sig Dispense Refill    montelukast (SINGULAIR) 10 mg tablet Take 1 Tab by mouth daily. 30 Tab 2    fluticasone propion-salmeteroL (ADVAIR/WIXELA) 250-50 mcg/dose diskus inhaler Take 1 Puff by inhalation every twelve (12) hours. 60 Each 2    phentermine (ADIPEX-P) 37.5 mg tablet Take 1 Tab by mouth every morning. Max Daily Amount: 37.5 mg. 30 Tab 0    chlorthalidone (HYGROTON) 25 mg tablet Take 1 Tab by mouth daily. 30 Tab 1    ibuprofen (MOTRIN) 800 mg tablet Take 1 Tab by mouth every six (6) hours as needed for Pain. 90 Tab 1    fluticasone (FLONASE) 50 mcg/actuation nasal spray 2 Sprays by Nasal route.  pantoprazole (PROTONIX) 20 mg tablet Take 20 mg by mouth.  venlafaxine-SR (EFFEXOR-XR) 150 mg capsule Take 150 mg by mouth.  gabapentin (NEURONTIN) 300 mg capsule Take 300 mg by mouth three (3) times daily.  albuterol (PROAIR HFA) 90 mcg/actuation inhaler Take  by inhalation.  LORazepam (ATIVAN) 0.5 mg tablet Take 1 Tab by mouth every twelve (12) hours as needed for Anxiety. Max Daily Amount: 1 mg. 60 Tab 0     No current facility-administered medications on file prior to visit.         Allergies   Allergen Reactions    Penicillins Hives       OBJECTIVE:  PHYSICAL EXAM  VITAL SIGNS: Visit Vitals  BP (!) 147/95 (BP 1 Location: Right arm, BP Patient Position: Sitting)   Pulse (!) 111   Temp 97.9 °F (36.6 °C) (Oral)   Ht 5' 2\" (1.575 m)   Wt 101.7 kg (224 lb 3.2 oz)   SpO2 98%   BMI 41.01 kg/m²      GENERAL ANDREW: Well nourished, well developed, no acute distress   GASTROINT: Soft, nontender, nondistended, NABS, no masses    MUSCULOSKEL: no joint tenderness, deformity or swelling    INTEGUMENT: no rash or abnormalities    EXTREMITIES: extremities normal, atraumatic, no cyanosis or edema    PELVIC: VULVA: normal appearing vulva with no masses, tenderness or lesions  VAGINA: normal appearing vagina, atrophic  CERVIX: normal appearing cervix, some blood at os   UTERUS: uterus is normal size, shape, consistency and nontender  ADNEXA: surgically absent bilateral.   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  Neuropathy, chemo induced, improved. Encouraged B6 and B12 use. Encourage patient to take Neurontin as prescribed or discuss with PCP.  Hot flashes. Continue OCP.   AFP and CA-125 WNL. Continue every 12 months  Pap done today  Patient verbalizes understanding of information provided today and agrees with plan of care    Total time spent was 25 minutes regarding diagnosis of ovarian cancer and >50% of this time was spent counseling and coordinating care.    Florentin Dudley DO  Gynecologic Oncology  1/13/2021/10:30 AM

## 2021-01-13 NOTE — LETTER
1/13/2021 Patient: Elyse Isaacs YOB: 1990 Date of Visit: 1/13/2021 Joyce Cuellar NP 
[address] [facility] [address] [address] [employer] Dear Joyce Cuellar NP, Thank you for referring Ms. Yuko Mcmillan to [facility] for evaluation. My notes for this consultation are attached. If you have questions, please do not hesitate to call me. I look forward to following your patient along with you. Sincerely, Shellie Shook MD

## 2021-01-21 RX ORDER — MIRTAZAPINE 15 MG/1
15 TABLET, FILM COATED ORAL
Qty: 30 TAB | Refills: 0 | Status: SHIPPED | OUTPATIENT
Start: 2021-01-21

## 2021-01-22 LAB
HPV H RFLX, 13184: NEGATIVE
PAP IMAGE GUIDED, 8900296: NORMAL

## 2021-01-25 ENCOUNTER — TELEPHONE (OUTPATIENT)
Dept: ONCOLOGY | Age: 31
End: 2021-01-25

## 2021-01-25 NOTE — TELEPHONE ENCOUNTER
These meds are listed as historical. Please sign if appropriate. Last Visit: 1/21/21 with MARIO Wheeler  Next Appointment: 2/10/21 with MARIO Wheeler    Requested Prescriptions     Pending Prescriptions Disp Refills    pantoprazole (PROTONIX) 20 mg tablet 90 Tab 1     Sig: Take 1 Tab by mouth Daily (before breakfast).  venlafaxine-SR (EFFEXOR-XR) 150 mg capsule 90 Cap 1     Sig: Take 1 Cap by mouth daily.

## 2021-01-28 ENCOUNTER — PATIENT MESSAGE (OUTPATIENT)
Dept: FAMILY MEDICINE CLINIC | Age: 31
End: 2021-01-28

## 2021-01-28 NOTE — TELEPHONE ENCOUNTER
Pt called to check status of refill for pantoprazole (PROTONIX) 20 mg tablet because currently out of med, adv still pending provider approval. Also states wanted provider to know that themirtazapine (REMERON) 15 mg tablet given doesn't work.  Please adv [phone]

## 2021-02-01 RX ORDER — PANTOPRAZOLE SODIUM 20 MG/1
20 TABLET, DELAYED RELEASE ORAL
Qty: 90 TAB | Refills: 1 | Status: SHIPPED | OUTPATIENT
Start: 2021-02-01 | End: 2021-07-28

## 2021-02-01 RX ORDER — VENLAFAXINE HYDROCHLORIDE 150 MG/1
150 CAPSULE, EXTENDED RELEASE ORAL DAILY
Qty: 90 CAP | Refills: 1 | Status: SHIPPED | OUTPATIENT
Start: 2021-02-01

## 2021-02-10 ENCOUNTER — VIRTUAL VISIT (OUTPATIENT)
Dept: FAMILY MEDICINE CLINIC | Age: 31
End: 2021-02-10
Payer: COMMERCIAL

## 2021-02-10 DIAGNOSIS — J45.30 MILD PERSISTENT ASTHMA WITHOUT COMPLICATION: ICD-10-CM

## 2021-02-10 DIAGNOSIS — E66.01 OBESITY, CLASS III, BMI 40-49.9 (MORBID OBESITY) (HCC): Primary | ICD-10-CM

## 2021-02-10 DIAGNOSIS — R03.0 ELEVATED BLOOD PRESSURE READING: ICD-10-CM

## 2021-02-10 PROCEDURE — 99213 OFFICE O/P EST LOW 20 MIN: CPT | Performed by: NURSE PRACTITIONER

## 2021-02-10 RX ORDER — PHENTERMINE HYDROCHLORIDE 37.5 MG/1
37.5 TABLET ORAL
Qty: 30 TAB | Refills: 1 | Status: SHIPPED | OUTPATIENT
Start: 2021-02-10

## 2021-02-10 NOTE — PROGRESS NOTES
Thekeanu Lund is a 31 y.o. female who was seen by synchronous (real-time) audio-video technology on 2/10/2021 for No chief complaint on file. Assessment & Plan:   Diagnoses and all orders for this visit:    1. Obesity, Class III, BMI 40-49.9 (morbid obesity) (HCC)  -     phentermine (ADIPEX-P) 37.5 mg tablet; Take 1 Tab by mouth every morning. Max Daily Amount: 37.5 mg.    2. Mild persistent asthma without complication  -     Nebulizer Accessories kit; Take  by inhalation every four to six (4-6) hours as needed (wheezing/shortness of breath). -     Nebulizer & Compressor machine; 1 Each by Does Not Apply route every four to six (4-6) hours as needed for Wheezing or Shortness of Breath. 3. Elevated blood pressure reading     above improved continue current treatment plan    Follow-up and Dispositions    · Return in about 3 months (around 5/10/2021) for elevated bp/weight, in office follow up. Routing History        I spent at least 20 minutes on this visit with this established patient. 712  Subjective:   Patient states since beginning phentermine she has lost 7 lbs. Denies any adverse effects with taking medication. Reports she has been decreasing her portion size  Hypertension ROS: taking medications as instructed, no medication side effects noted, home BP monitoring in range of 444'A systolic over 80'Z diastolic, no TIA's, no chest pain on exertion, no dyspnea on exertion, no swelling of ankles. Anxiety: patient reports she took 1 tab of Remeron and it caused her to have drowsiness the following day. Comments she didn't any further medication after. Would like to continue with taking Ativan strictly as needed. Patient requesting nebulizer machine for her home use. Comments when her asthma flares she will borrow her sister's nebulizer with improvement. Prior to Admission medications    Medication Sig Start Date End Date Taking?  Authorizing Provider   pantoprazole (PROTONIX) 20 mg tablet Take 1 Tab by mouth Daily (before breakfast). 2/1/21   Wilson BLOOM NP   venlafaxine-SR [facility] 150 mg capsule Take 1 Cap by mouth daily. 2/1/21   Wilson BLOOM NP   mirtazapine (REMERON) 15 mg tablet Take 1 Tab by mouth nightly. 1/21/21   Wilson BLOOM NP   norethindrone-ethinyl estradiol-iron ([place] 1.5/30, 28,) 1.5 mg-30 mcg (21)/75 mg (7) tab TAKE ONE TABLET BY MOUTH DAILY *DO NOT TAKE THE LAST 7 TABLETS, DISCARD AND START NEW PACK* 1/13/21   Mellissa North MD   montelukast (SINGULAIR) 10 mg tablet Take 1 Tab by mouth daily. 1/12/21   Wilson BLOOM NP   fluticasone propion-salmeteroL (ADVAIR/WIXELA) 250-50 mcg/dose diskus inhaler Take 1 Puff by inhalation every twelve (12) hours. 1/12/21   Wilson BLOOM NP   phentermine (ADIPEX-P) 37.5 mg tablet Take 1 Tab by mouth every morning. Max Daily Amount: 37.5 mg. 1/12/21   Wilson BLOOM NP   chlorthalidone (HYGROTON) 25 mg tablet Take 1 Tab by mouth daily. 1/12/21   Wilson BLOOM NP   ibuprofen (MOTRIN) 800 mg tablet Take 1 Tab by mouth every six (6) hours as needed for Pain. 9/10/20   Wilson BLOOM NP   fluticasone (FLONASE) 50 mcg/actuation nasal spray 2 Sprays by Nasal route. 7/25/17   Provider, Historical   gabapentin (NEURONTIN) 300 mg capsule Take 300 mg by mouth three (3) times daily. Provider, Historical   albuterol (PROAIR HFA) 90 mcg/actuation inhaler Take  by inhalation. Provider, Historical   LORazepam (ATIVAN) 0.5 mg tablet Take 1 Tab by mouth every twelve (12) hours as needed for Anxiety.  Max Daily Amount: 1 mg. 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 ([address] C56.2    IUP (intrauterine pregnancy), incidental Z33.1    Ovarian cancer ([address] C56.9    Ileus, postoperative ([address] K91.89, K56.7  Breast engorgement, postpartum O92.79    Hypokalemia E87.6    Neuropathy due to chemotherapeutic drug ([facility] G62.0, T45.1X5A    Tinnitus H93.19    Fatigue R53.83    Indigestion K30    SOB (shortness of breath) R06.02    Arthralgia M25.50    Anxiety F41.9    Stress F43.9    Insomnia G47.00    Post-menopausal bleeding N95.0    Vaginal bleeding N93.9    Vaginal candidiasis B37.3    Obesity, morbid ([facility]) E66.01     Patient Active Problem List    Diagnosis Date Noted    Ob[facility], morbid ([facility] 05/07/2018    Vaginal candidiasis 07/18/2016    Post-menopausal bleeding 05/01/2015    Vaginal bleeding 05/01/2015    SOB (shortness of breath) 02/09/2015    Arthralgia 02/09/2015    Anxiety 02/09/2015    Stress 02/09/2015    Insomnia 02/09/2015    Indigestion 12/04/2014    Neuropathy due to chemotherapeutic drug ([facility] 10/10/2014    Tinnitus 10/10/2014    Fatigue 10/10/2014    Hypokalemia 09/08/2014    Ileus, postoperative ([facility] 09/04/2014    Breast engorgement, postpartum 09/04/2014    Ovarian cancer ([facility] 08/28/2014    IUP (intrauterine pregnancy), incidental 07/15/2014    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 90 Tab 1    venlafaxine-SR (EFFEXOR-XR) 150 mg capsule Take 1 Cap by mouth daily. 90 Cap 1    mirtazapine (REMERON) 15 mg tablet Take 1 Tab by mouth nightly. 30 Tab 0    norethindrone-ethinyl estradiol-iron ([place] 1.5/30, 28,) 1.5 mg-30 mcg (21)/75 mg (7) tab TAKE ONE TABLET BY MOUTH DAILY *DO NOT TAKE THE LAST 7 TABLETS, DISCARD AND START NEW PACK* 84 Tab 4    montelukast (SINGULAIR) 10 mg tablet Take 1 Tab by mouth daily.  30 Tab 2    fluticasone propion-salmeteroL (ADVAIR/WIXELA) 250-50 mcg/dose diskus inhaler Take 1 Puff by inhalation every twelve (12) hours. 60 Each 2    phentermine (ADIPEX-P) 37.5 mg tablet Take 1 Tab by mouth every morning. Max Daily Amount: 37.5 mg. 30 Tab 0    chlorthalidone (HYGROTON) 25 mg tablet Take 1 Tab by mouth daily. 30 Tab 1    ibuprofen (MOTRIN) 800 mg tablet Take 1 Tab by mouth every six (6) hours as needed for Pain. 90 Tab 1    fluticasone (FLONASE) 50 mcg/actuation nasal spray 2 Sprays by Nasal route.  gabapentin (NEURONTIN) 300 mg capsule Take 300 mg by mouth three (3) times daily.  albuterol (PROAIR HFA) 90 mcg/actuation inhaler Take  by inhalation.  LORazepam (ATIVAN) 0.5 mg tablet Take 1 Tab by mouth every twelve (12) hours as needed for Anxiety. Max Daily Amount: 1 mg. 60 Tab 0     Allergies   Allergen Reactions    Penicillins Hives     Past Medical History:   Diagnosis Date    Anxiety     Asthma     well controlled    Cancer ([address]     left ovary h/o    Coagulation disorder ([address]     Dyspepsia and other specified disorders of function of stomach     Hypokalemia 9/8/2014    Morbid obesity ([address]     Ovarian [facility]     PIH (pregnancy induced hypertension)     Pink eye disease, left      Past Surgical History:   Procedure Laterality Date    HX ADENOIDECTOMY Bilateral 10/2005    HX OOPHORECTOMY      left    HX POLYPECTOMY  1993    rectal     Family History   Problem Relation Age of Onset    Cancer Father     Cancer Paternal Uncle      Social History     Tobacco Use    Smoking status: Never Smoker    Smokeless tobacco: Never Used   Substance Use Topics    Alcohol use: Yes     Alcohol/week: 0.0 standard drinks     Comment: 1-2 mixed drinks monthly     ROS    Objective:   No flowsheet data found.    General: alert, cooperative, no distress   Mental  status: normal mood, behavior, speech, dress, motor activity, and thought processes, able to follow commands   HENT: NCAT   Neck: no visualized mass   Resp: no respiratory distress   Neuro: no gross deficits   Skin: no discoloration or lesions of concern on visible areas   Psychiatric: normal affect, consistent with stated mood, no evidence of hallucinations     Additional exam findings: We discussed the expected course, resolution and complications of the diagnosis(es) in detail. Medication risks, benefits, costs, interactions, and alternatives were discussed as indicated. I advised her to contact the office if her condition worsens, changes or fails to improve as anticipated. She expressed understanding with the diagnosis(es) and plan. Yasmin Phillips, who was evaluated through a patient-initiated, synchronous (real-time) audio-video encounter, and/or her healthcare decision maker, is aware that it is a billable service, with coverage as determined by her insurance carrier. She provided verbal consent to proceed: Yes, and patient identification was verified. It was conducted pursuant to the emergency declaration under the [address] [place] [address], [address] [place] [address] authority and the [employer] and [employer] General Act. A caregiver was present when appropriate. Ability to conduct physical exam was limited. I was in the office. The patient was at home.       Rosa Purcell NP

## 2021-02-18 RX ORDER — NEBULIZER AND COMPRESSOR
1 EACH MISCELLANEOUS
Qty: 1 EACH | Refills: 0 | Status: SHIPPED | OUTPATIENT
Start: 2021-02-18

## 2021-04-30 RX ORDER — MONTELUKAST SODIUM 10 MG/1
TABLET ORAL
Qty: 30 TAB | Refills: 1 | Status: SHIPPED | OUTPATIENT
Start: 2021-04-30

## 2021-06-14 RX ORDER — CHLORTHALIDONE 25 MG/1
TABLET ORAL
Qty: 30 TABLET | Refills: 1 | Status: SHIPPED | OUTPATIENT
Start: 2021-06-14 | End: 2021-08-12

## 2021-07-28 RX ORDER — PANTOPRAZOLE SODIUM 20 MG/1
TABLET, DELAYED RELEASE ORAL
Qty: 90 TABLET | Refills: 1 | Status: SHIPPED | OUTPATIENT
Start: 2021-07-28

## 2021-09-20 RX ORDER — CHLORTHALIDONE 25 MG/1
TABLET ORAL
Qty: 30 TABLET | Refills: 0 | Status: SHIPPED | OUTPATIENT
Start: 2021-09-20

## 2022-01-06 ENCOUNTER — APPOINTMENT (OUTPATIENT)
Dept: PHYSICAL THERAPY | Age: 32
End: 2022-01-06

## 2022-01-12 DIAGNOSIS — C56.2 MALIGNANT GERM CELL TUMOR OF LEFT OVARY (HCC): ICD-10-CM

## 2022-01-13 RX ORDER — NORETHINDRONE ACETATE AND ETHINYL ESTRADIOL 1.5-30(21)
KIT ORAL
Qty: 84 TABLET | Refills: 4 | Status: SHIPPED | OUTPATIENT
Start: 2022-01-13 | End: 2022-02-18

## 2022-02-18 ENCOUNTER — OFFICE VISIT (OUTPATIENT)
Dept: ONCOLOGY | Age: 32
End: 2022-02-18
Payer: COMMERCIAL

## 2022-02-18 ENCOUNTER — HOSPITAL ENCOUNTER (OUTPATIENT)
Dept: LAB | Age: 32
Discharge: HOME | End: 2022-02-18

## 2022-02-18 VITALS
BODY MASS INDEX: 41.04 KG/M2 | DIASTOLIC BLOOD PRESSURE: 95 MMHG | SYSTOLIC BLOOD PRESSURE: 141 MMHG | TEMPERATURE: 98.7 F | OXYGEN SATURATION: 100 % | RESPIRATION RATE: 16 BRPM | WEIGHT: 223 LBS | HEART RATE: 88 BPM | HEIGHT: 62 IN

## 2022-02-18 DIAGNOSIS — C56.2 MALIGNANT GERM CELL TUMOR OF LEFT OVARY (HCC): ICD-10-CM

## 2022-02-18 DIAGNOSIS — C56.2 MALIGNANT GERM CELL TUMOR OF LEFT OVARY (HCC): Primary | ICD-10-CM

## 2022-02-18 LAB — SENTARA SPECIMEN COL,SENBCF: NORMAL

## 2022-02-18 PROCEDURE — 99213 OFFICE O/P EST LOW 20 MIN: CPT | Performed by: OBSTETRICS & GYNECOLOGY

## 2022-02-18 PROCEDURE — 99001 SPECIMEN HANDLING PT-LAB: CPT

## 2022-02-18 NOTE — LETTER
2/18/2022    Patient: Marissa Sethi   YOB: 1990   Date of Visit: 2/18/2022     Roshni Nugent NP  [address] [facility]  [address] [place]  [address] [facility]    Dear Roshni Nugent NP,      Thank you for referring Ms. Yuko Mcmillan to [address] [place] [address] for evaluation. My notes for this consultation are attached. If you have questions, please do not hesitate to call me. I look forward to following your patient along with you.       Sincerely,    Grant Fischer MD

## 2022-02-18 NOTE — PROGRESS NOTES
[facility]  [address] [place] [address], [address], [address] [id number] [phone]      Patient ID:  Josh Carter  [id number]   y.o. 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 Diagnosed initially 10/24/2013 with a recurrence in 2014. Pt completed Bleomycin/Etoposide/Cisplatin for recurrence 14. Her previous procedures include Oophorectomy, right, and D&C 10/24/2014. Patient is also status post Oophorectomy, left, Diagnostic Lap, Exploratory Lap, Resection of Right Pelvic Mass, Lysis of adhesions and Cystoscopy 2014. She is here today for a 1 year surveillance visit. Patient complaining of uncontrolled hot flashes      US TRANSVAGINAL: 5/15/15    IMPRESSION:  The uterus is within normal limits.  Both ovaries have been removed    Labs:  2021  AFP: 3.7  :8.44      Component      Latest Ref Rng & Units 12/3/2019 12/3/2019           9:54 AM  9:54 AM   AFP, Serum, Tumor Marker      0.0 - 8.3 ng/mL  4.0   CA-125      1.5 - 35.0 U/mL 4      Component       Cancer Ag (CA) 125 CA-125   Latest Ref Rng & Units       0.0 - 38.1 U/mL 1.5 - 35.0 U/mL   2019      10:05 AM  6   3/28/2019      10:58 AM 7.6    10/15/2018      9:03 AM 8.2    10/26/2017      9:45 AM 7.0    2017      12:00 AM 6.8    2017      12:00 AM 6.8    10/11/2016      7:35 AM 8.1    2016      8:30 AM 9.3    6/3/2016      7:55 AM 8.4    2016      7:44 AM 7.6    2016      9:00 AM 8.1    3/15/2016      8:07 AM 8.1    2016      9:58 AM 6.5    2016      8:09 AM 7.6    2015      10:23 AM 9.0    2015      8:25 AM 9.8    10/12/2015      11:42 AM 9.0    2015      12:00 AM 8.2    2015      9:06 AM 9.1    2015      2:03 PM 10.2    2015      10:50 AM 8.7    2015      10:09 AM 8.8    2015      4:22 PM 8.3 3/5/2015      12:00 AM 8.1    1/5/2015      11:15 AM 12.8    12/12/2014      10:15 AM 13.5    11/21/2014      8:40 AM 14.2    10/31/2014      11:30 AM 11.2    9/26/2014      1:30 PM 33.6    5/7/2014      12:00 AM 11.5    1/20/2014      12:00 AM 12.6    12/30/2013      10:15 AM 11.8    11/25/2013      12:00 AM 46.0 (H)    11/4/2013      1:31 PM 82.7 (H)      Component       AFP, Serum, Tumor Marker   Latest Ref Rng & Units       0.0 - 8.3 ng/mL   6/7/2019      10:05 AM 3.8   3/28/2019      10:58 AM 4.7   10/15/2018       9:03 AM 4.9   5/4/2018      12:00 AM 4.2   2/27/2018      12:00 AM 4.3   10/26/2017       9:45 AM 4.3   7/11/2017      12:00 AM 5.0   4/25/2017      12:00 AM 3.8   2/9/2017      12:00 AM 4.2   10/11/2016       7:35 AM 4.6   7/6/2016       8:30 AM 3.9   6/3/2016       7:55 AM 3.9   5/11/2016       7:44 AM 4.1   4/14/2016       9:00 AM 3.6   3/15/2016       8:07 AM 4.2   2/17/2016       9:58 AM 3.2   1/8/2016       8:09 AM 4.1   12/2/2015      10:23 AM 5.6   11/12/2015       8:25 AM 4.7   10/12/2015      11:42 AM 4.0   9/18/2015      12:00 AM 4.3   8/4/2015       9:06 AM 4.7   7/7/2015       2:03 PM 4.0   6/5/2015      10:50 AM 4.2   5/12/2015      10:09 AM 4.1   4/6/2015       4:22 PM 4.2   3/5/2015      12:00 AM 4.1   1/23/2015      12:00 AM 6.0   1/5/2015      11:15 AM 6.3   12/4/2014      12:10 PM 8.8 (H)   10/31/2014      11:30 AM 54.6 (H)   10/10/2014       9:55 .5 (H)   9/8/2014      12:00 AM 25,163.0 (H)   5/29/2014      12:00 AM 6,693.0 (H)   4/30/2014      12:00 .1 (H)   3/21/2014      12:00 AM 59.1 (H)   2/20/2014      11:10 AM 20.4 (H)   1/20/2014      12:00 AM 21.4 (H)   12/30/2013      10:15 AM 30.8 (H)   11/25/2013      12:00 .2 (H)   11/4/2013       1:31 PM 3,137.0 (H)           COMPREHENSIVE ROS:   Detailed positives and pertinent negatives in HPI      Past Medical History:   Diagnosis Date    Anxiety     Asthma     well controlled    [facility] ([facility]     left ovary h/o    [facility]     Dyspepsia and other specified disorders of function of stomach     Hypokalemia 9/8/2014    Morbid obesity ([facility]     [facility] cancer ([facility]     PIH (pregnancy induced hypertension)     Pink eye disease, left        Past Surgical History:   Procedure Laterality Date    HX ADENOIDECTOMY Bilateral 10/2005    HX OOPHORECTOMY      left    HX POLYPECTOMY  1993    rectal       Social History     Socioeconomic History    Marital status:      Spouse name: Not on file    Number of children: Not on file    Years of education: Not on file    Highest education level: Not on file   Occupational History    Not on file   Tobacco Use    Smoking status: Never Smoker    Smokeless tobacco: Never Used   Vaping Use    Vaping Use: Never used   Substance and Sexual Activity    Alcohol use: Yes     Alcohol/week: 0.0 standard drinks     Comment: 1-2 mixed drinks monthly    Drug use: No    Sexual activity: Yes     Partners: Male     Birth control/protection: Pill   Other Topics Concern    Not on file   Social History Narrative    Not on file     Social Determinants of Health     Financial Resource Strain:     Difficulty of Paying Living Expenses: Not on file   Food Insecurity:     Worried About Running Out of Food in the Last Year: Not on file    Prince of Food in the Last Year: Not on file   Transportation Needs:     Lack of Transportation (Medical): Not on file    Lack of Transportation (Non-Medical):  Not on file   Physical Activity:     Days of Exercise per Week: Not on file    Minutes of Exercise per Session: Not on file   Stress:     Feeling of Stress : Not on file   Social Connections:     Frequency of Communication with Friends and Family: Not on file    Frequency of Social Gatherings with Friends and Family: Not on file    Attends Worship Services: Not on file    Active Member of Clubs or Organizations: Not on file    Attends Club or Organization Meetings: Not on file    Marital Status: Not on file   Intimate Partner Violence:     Fear of Current or Ex-Partner: Not on file    Emotionally Abused: Not on file    Physically Abused: Not on file    Sexually Abused: Not on file   Housing Stability:     Unable to Pay for Housing in the Last Year: Not on file    Number of Places Lived in the Last Year: Not on file    Unstable Housing in the Last Year: Not on file       Family History   Problem Relation Age of Onset    Cancer Father     Cancer Paternal Uncle        Current Outpatient Medications on File Prior to Visit   Medication Sig Dispense Refill    chlorthalidone (HYGROTON) 25 mg tablet TAKE ONE TABLET BY MOUTH DAILY 30 Tablet 0    pantoprazole (PROTONIX) 20 mg tablet TAKE ONE TABLET BY MOUTH EVERY MORNING BEFORE BREAKFAST 90 Tablet 1    montelukast (SINGULAIR) 10 mg tablet TAKE ONE TABLET BY MOUTH DAILY 30 Tab 1    Nebulizer Accessories kit Take  by inhalation every four to six (4-6) hours as needed (wheezing/shortness of breath). 1 Kit 0    Nebulizer & Compressor machine 1 Each by Does Not Apply route every four to six (4-6) hours as needed for Wheezing or Shortness of Breath. 1 Each 0    venlafaxine-SR (EFFEXOR-XR) 150 mg capsule Take 1 Cap by mouth daily. 90 Cap 1    fluticasone propion-salmeteroL (ADVAIR/WIXELA) 250-50 mcg/dose diskus inhaler Take 1 Puff by inhalation every twelve (12) hours. 60 Each 2    albuterol (PROAIR HFA) 90 mcg/actuation inhaler Take  by inhalation.  LORazepam (ATIVAN) 0.5 mg tablet Take 1 Tab by mouth every twelve (12) hours as needed for Anxiety. Max Daily Amount: 1 mg. 60 Tab 0    phentermine (ADIPEX-P) 37.5 mg tablet Take 1 Tab by mouth every morning. Max Daily Amount: 37.5 mg. (Patient not taking: Reported on 2/18/2022) 30 Tab 1    mirtazapine (REMERON) 15 mg tablet Take 1 Tab by mouth nightly.  (Patient not taking: Reported on 2/18/2022) 30 Tab 0    ibuprofen (MOTRIN) 800 mg tablet Take 1 Tab by mouth every six (6) hours as needed for Pain. 90 Tab 1    fluticasone (FLONASE) 50 mcg/actuation nasal spray 2 Sprays by Nasal route. (Patient not taking: Reported on 2/18/2022)      gabapentin (NEURONTIN) 300 mg capsule Take 300 mg by mouth three (3) times daily. (Patient not taking: Reported on 2/18/2022)       No current facility-administered medications on file prior to visit. Allergies   Allergen Reactions    Penicillins Hives       OBJECTIVE:  PHYSICAL EXAM  VITAL SIGNS: Visit Vitals  BP (!) 141/95 (BP 1 Location: Right arm, BP Patient Position: Sitting)   Pulse 88   Temp 98.7 °F (37.1 °C) (Oral)   Resp 16   Ht 5' 2\" (1.575 m)   Wt 101.2 kg (223 lb)   SpO2 100%   BMI 40.79 kg/m²      GENERAL ANDREW: Well nourished, well developed, no acute distress   GASTROINT: Soft, nontender, nondistended, NABS, no masses    MUSCULOSKEL: no joint tenderness, deformity or swelling    INTEGUMENT: no rash or abnormalities    EXTREMITIES: extremities normal, atraumatic, no cyanosis or edema    PELVIC: VULVA: normal appearing vulva with no masses, tenderness or lesions  VAGINA: normal appearing vagina, atrophic  CERVIX: normal appearing cervix, some blood at os   UTERUS: uterus is normal size, shape, consistency and nontender  ADNEXA: surgically absent bilateral.   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 Hot flashes. Started on Pempro daily. Will discontinue OCP  AFP and CA-125 ordered today, will call with results  Patient verbalizes understanding of information provided today and agrees with plan of care    Total time spent was 25 minutes regarding diagnosis of ovarian cancer and >50% of this time was spent counseling and coordinating care.     Martha Lopez  Gynecologic Oncology  2/18/2022/10:30 AM

## 2022-02-18 NOTE — PROGRESS NOTES
Mark De Leon is a 27 y.o. female presents in office for surveillance ovarian cancer. Chief Complaint   Patient presents with    Ovarian Cancer      Pt c/o hot flashes and night sweats. Do you have any unusual vaginal bleeding, discharge or irritation? No.  Do you have any changes in your bowel movements? no  Have you been experiencing nausea or vomiting? no  Have you been experiencing any continuous or worsening abdominal pain? no  Any urinary burning? no    Visit Vitals  BP (!) 141/95 (BP 1 Location: Right arm, BP Patient Position: Sitting)   Pulse 88   Temp 98.7 °F (37.1 °C) (Oral)   Resp 16   Ht 5' 2\" (1.575 m)   Wt 101.2 kg (223 lb)   SpO2 100%   BMI 40.79 kg/m²         1. Have you been to the ER, urgent care clinic since your last visit? Hospitalized since your last visit? No    2. Have you seen or consulted any other health care providers outside of the [address] [place] [address] since your last visit? Include any pap smears or colon screening.  No    3 most recent PHQ Screens 2/10/2021   Little interest or pleasure in doing things Not at all   Feeling down, depressed, irritable, or hopeless Not at all   Total Score PHQ 2 0   Trouble falling or staying asleep, or sleeping too much Not at all   Feeling tired or having little energy Not at all   Poor appetite, weight loss, or overeating Not at all   Feeling bad about yourself - or that you are a failure or have let yourself or your family down Not at all   Trouble concentrating on things such as school, work, reading, or watching TV Not at all   Moving or speaking so slowly that other people could have noticed; or the opposite being so fidgety that others notice Not at all   Thoughts of being better off dead, or hurting yourself in some way Not at all   PHQ 9 Score 0   How difficult have these problems made it for you to do your work, take care of your home and get along with others Not difficult at all       Abuse Screening Questionnaire 11/7/2017   Do you ever feel afraid of your partner? N   Are you in a relationship with someone who physically or mentally threatens you? N   Is it safe for you to go home? Y       No flowsheet data found.     Learning Assessment 11/7/2017   PRIMARY LEARNER Patient   HIGHEST LEVEL OF EDUCATION - PRIMARY LEARNER  2 YEARS OF COLLEGE   BARRIERS PRIMARY LEARNER NONE   CO-LEARNER CAREGIVER No   PRIMARY LANGUAGE ENGLISH   LEARNER PREFERENCE PRIMARY DEMONSTRATION   ANSWERED BY patient   RELATIONSHIP SELF

## 2022-02-21 ENCOUNTER — TELEPHONE (OUTPATIENT)
Dept: ONCOLOGY | Age: 32
End: 2022-02-21

## 2022-02-21 NOTE — TELEPHONE ENCOUNTER
Spoke with patient and explained to her the reason Prempro was ordered for HRT is because she has an intact uterus and we don't want further problems in the future. I offered her information on ways to reduce the cost of medication but said she's tried that and it still expensive. I explained to her that our office don't usually prescribe HRT, so she should discuss HRT with her PCP or ob/gyn for further assistance since they prescribe it more than we do. Patient agreed. All of her questions were answered.

## 2022-03-19 PROBLEM — E66.01 OBESITY, MORBID (HCC): Status: ACTIVE | Noted: 2018-05-07

## 2022-12-15 ENCOUNTER — HOSPITAL ENCOUNTER (EMERGENCY)
Age: 32
Discharge: HOME | End: 2022-12-16
Attending: EMERGENCY MEDICINE

## 2022-12-15 DIAGNOSIS — B34.9 VIRAL SYNDROME: Primary | ICD-10-CM

## 2022-12-15 PROCEDURE — 83690 ASSAY OF LIPASE: CPT

## 2022-12-15 PROCEDURE — 85025 COMPLETE CBC W/AUTO DIFF WBC: CPT

## 2022-12-15 PROCEDURE — 96374 THER/PROPH/DIAG INJ IV PUSH: CPT

## 2022-12-15 PROCEDURE — 74011250636 HC RX REV CODE- 250/636: Performed by: EMERGENCY MEDICINE

## 2022-12-15 PROCEDURE — 80053 COMPREHEN METABOLIC PANEL: CPT

## 2022-12-15 PROCEDURE — 84703 CHORIONIC GONADOTROPIN ASSAY: CPT

## 2022-12-15 PROCEDURE — 99284 EMERGENCY DEPT VISIT MOD MDM: CPT

## 2022-12-15 RX ORDER — ONDANSETRON 2 MG/ML
4 INJECTION INTRAMUSCULAR; INTRAVENOUS
Status: COMPLETED | OUTPATIENT
Start: 2022-12-15 | End: 2022-12-15

## 2022-12-15 RX ADMIN — ONDANSETRON 4 MG: 2 INJECTION INTRAMUSCULAR; INTRAVENOUS at 23:35

## 2022-12-15 RX ADMIN — SODIUM CHLORIDE 1000 ML: 9 INJECTION, SOLUTION INTRAVENOUS at 23:37

## 2022-12-15 NOTE — Clinical Note
[address] [facility] EMERGENCY DEPT  [id number] [id number] [phone]    Work/School Note    Date: 12/15/2022    To Whom It May concern:    Vanessa Marcelo was seen and treated today in the emergency room by the following provider(s):  Attending Provider: Brett Joseph MD.      Vanessa Marcelo is excused from work/school on 12/16/22 and 12/17/22. She is medically clear to return to work/school on 12/18/2022.        Sincerely,          Clovis Avery MD

## 2022-12-16 VITALS
BODY MASS INDEX: 40.85 KG/M2 | DIASTOLIC BLOOD PRESSURE: 73 MMHG | RESPIRATION RATE: 18 BRPM | OXYGEN SATURATION: 99 % | HEART RATE: 88 BPM | HEIGHT: 62 IN | SYSTOLIC BLOOD PRESSURE: 127 MMHG | TEMPERATURE: 99.7 F | WEIGHT: 222 LBS

## 2022-12-16 LAB
ALBUMIN SERPL-MCNC: 3.5 G/DL (ref 3.4–5)
ALBUMIN/GLOB SERPL: 0.7 {RATIO} (ref 0.8–1.7)
ALP SERPL-CCNC: 56 U/L (ref 45–117)
ALT SERPL-CCNC: 32 U/L (ref 13–56)
ANION GAP SERPL CALC-SCNC: 3 MMOL/L (ref 3–18)
AST SERPL-CCNC: 50 U/L (ref 10–38)
BASOPHILS # BLD: 0 K/UL (ref 0–0.1)
BASOPHILS NFR BLD: 0 % (ref 0–2)
BILIRUB SERPL-MCNC: 0.6 MG/DL (ref 0.2–1)
BUN SERPL-MCNC: 14 MG/DL (ref 7–18)
BUN/CREAT SERPL: 16 (ref 12–20)
CALCIUM SERPL-MCNC: 8.9 MG/DL (ref 8.5–10.1)
CHLORIDE SERPL-SCNC: 104 MMOL/L (ref 100–111)
CO2 SERPL-SCNC: 25 MMOL/L (ref 21–32)
COVID-19 RAPID TEST, COVR: NOT DETECTED
CREAT SERPL-MCNC: 0.88 MG/DL (ref 0.6–1.3)
DIFFERENTIAL METHOD BLD: ABNORMAL
EOSINOPHIL # BLD: 0 K/UL (ref 0–0.4)
EOSINOPHIL NFR BLD: 0 % (ref 0–5)
ERYTHROCYTE [DISTWIDTH] IN BLOOD BY AUTOMATED COUNT: 13.2 % (ref 11.6–14.5)
FLUAV AG NPH QL IA: NEGATIVE
FLUBV AG NOSE QL IA: NEGATIVE
GLOBULIN SER CALC-MCNC: 4.7 G/DL (ref 2–4)
GLUCOSE SERPL-MCNC: 116 MG/DL (ref 74–99)
HCG SERPL QL: NEGATIVE
HCT VFR BLD AUTO: 40.7 % (ref 35–45)
HGB BLD-MCNC: 13.9 G/DL (ref 12–16)
IMM GRANULOCYTES # BLD AUTO: 0 K/UL (ref 0–0.04)
IMM GRANULOCYTES NFR BLD AUTO: 0 % (ref 0–0.5)
LIPASE SERPL-CCNC: 137 U/L (ref 73–393)
LYMPHOCYTES # BLD: 0.4 K/UL (ref 0.9–3.6)
LYMPHOCYTES NFR BLD: 6 % (ref 21–52)
MCH RBC QN AUTO: 27.9 PG (ref 24–34)
MCHC RBC AUTO-ENTMCNC: 34.2 G/DL (ref 31–37)
MCV RBC AUTO: 81.7 FL (ref 78–100)
MONOCYTES # BLD: 0.5 K/UL (ref 0.05–1.2)
MONOCYTES NFR BLD: 6 % (ref 3–10)
NEUTS SEG # BLD: 7 K/UL (ref 1.8–8)
NEUTS SEG NFR BLD: 88 % (ref 40–73)
NRBC # BLD: 0 K/UL (ref 0–0.01)
NRBC BLD-RTO: 0 PER 100 WBC
PLATELET # BLD AUTO: 353 K/UL (ref 135–420)
PMV BLD AUTO: 11.1 FL (ref 9.2–11.8)
POTASSIUM SERPL-SCNC: 4.1 MMOL/L (ref 3.5–5.5)
PROT SERPL-MCNC: 8.2 G/DL (ref 6.4–8.2)
RBC # BLD AUTO: 4.98 M/UL (ref 4.2–5.3)
SODIUM SERPL-SCNC: 132 MMOL/L (ref 136–145)
SOURCE, COVRS: NORMAL
WBC # BLD AUTO: 7.9 K/UL (ref 4.6–13.2)

## 2022-12-16 PROCEDURE — 96361 HYDRATE IV INFUSION ADD-ON: CPT

## 2022-12-16 PROCEDURE — 74011250636 HC RX REV CODE- 250/636: Performed by: EMERGENCY MEDICINE

## 2022-12-16 PROCEDURE — 87635 SARS-COV-2 COVID-19 AMP PRB: CPT

## 2022-12-16 PROCEDURE — 87804 INFLUENZA ASSAY W/OPTIC: CPT

## 2022-12-16 PROCEDURE — 96375 TX/PRO/DX INJ NEW DRUG ADDON: CPT

## 2022-12-16 RX ORDER — KETOROLAC TROMETHAMINE 15 MG/ML
30 INJECTION, SOLUTION INTRAMUSCULAR; INTRAVENOUS
Status: DISCONTINUED | OUTPATIENT
Start: 2022-12-16 | End: 2022-12-16

## 2022-12-16 RX ORDER — IBUPROFEN 600 MG/1
600 TABLET ORAL
Qty: 20 TABLET | Refills: 0 | Status: SHIPPED | OUTPATIENT
Start: 2022-12-16

## 2022-12-16 RX ORDER — KETOROLAC TROMETHAMINE 15 MG/ML
15 INJECTION, SOLUTION INTRAMUSCULAR; INTRAVENOUS
Status: COMPLETED | OUTPATIENT
Start: 2022-12-16 | End: 2022-12-16

## 2022-12-16 RX ORDER — ONDANSETRON 4 MG/1
4 TABLET, FILM COATED ORAL
Qty: 12 TABLET | Refills: 0 | Status: SHIPPED | OUTPATIENT
Start: 2022-12-16

## 2022-12-16 RX ORDER — ONDANSETRON 2 MG/ML
4 INJECTION INTRAMUSCULAR; INTRAVENOUS
Status: DISCONTINUED | OUTPATIENT
Start: 2022-12-16 | End: 2022-12-16 | Stop reason: HOSPADM

## 2022-12-16 RX ADMIN — KETOROLAC TROMETHAMINE 15 MG: 15 INJECTION, SOLUTION INTRAMUSCULAR; INTRAVENOUS at 00:15

## 2022-12-16 NOTE — ED TRIAGE NOTES
Patient is ambulatory into triage for c/o vomiting since this morning. 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 NAD noted.

## 2022-12-16 NOTE — ED PROVIDER NOTES
EMERGENCY DEPARTMENT HISTORY AND PHYSICAL EXAM      Date: 12/15/2022  Patient Name: Kim Ruggiero      History of Presenting Illness     Chief Complaint   Patient presents with    Vomiting       Location/Duration/Severity/Modifying factors   Chief Complaint   Patient presents with    Vomiting       HPI:  Kim Ruggiero is a 34 y.o. female with history as listed presents with a concern of being exposed to sick children on her job. Location: N/V  Duration: 1 day   Quality: mild   Severity: mild  Modifying Factors: mild    Additional History: mild        There are no other complaints, changes, or physical findings at this time. PCP: Amie Henriquez MD    Current Facility-Administered Medications   Medication Dose Route Frequency Provider Last Rate Last Admin    ketorolac (TORADOL) injection 15 mg  15 mg IntraVENous NOW Hedy Goncalves MD        sodium chloride 0.9 % bolus infusion 1,000 mL  1,000 mL IntraVENous ONCE Hedy Goncalves MD 1,000 mL/hr at 12/15/22 2337 1,000 mL at 12/15/22 2337     Current Outpatient Medications   Medication Sig Dispense Refill    estrogen, conjugated,-medroxyPROGESTERone (PREMPRO) 0.3-1.5 mg tab Take 1 Tablet by mouth daily. 30 Tablet 2    chlorthalidone (HYGROTON) 25 mg tablet TAKE ONE TABLET BY MOUTH DAILY 30 Tablet 0    pantoprazole (PROTONIX) 20 mg tablet TAKE ONE TABLET BY MOUTH EVERY MORNING BEFORE BREAKFAST 90 Tablet 1    montelukast (SINGULAIR) 10 mg tablet TAKE ONE TABLET BY MOUTH DAILY 30 Tab 1    Nebulizer Accessories kit Take  by inhalation every four to six (4-6) hours as needed (wheezing/shortness of breath). 1 Kit 0    Nebulizer & Compressor machine 1 Each by Does Not Apply route every four to six (4-6) hours as needed for Wheezing or Shortness of Breath. 1 Each 0    phentermine (ADIPEX-P) 37.5 mg tablet Take 1 Tab by mouth every morning.  Max Daily Amount: 37.5 mg. (Patient not taking: Reported on 2/18/2022) 30 Tab 1    venlafaxine-SR (EFFEXOR-XR) 150 mg capsule Take 1 Cap by mouth daily. 90 Cap 1    mirtazapine (REMERON) 15 mg tablet Take 1 Tab by mouth nightly. (Patient not taking: Reported on 2/18/2022) 30 Tab 0    fluticasone propion-salmeteroL (ADVAIR/WIXELA) 250-50 mcg/dose diskus inhaler Take 1 Puff by inhalation every twelve (12) hours. 60 Each 2    ibuprofen (MOTRIN) 800 mg tablet Take 1 Tab by mouth every six (6) hours as needed for Pain. 90 Tab 1    fluticasone (FLONASE) 50 mcg/actuation nasal spray 2 Sprays by Nasal route. (Patient not taking: Reported on 2/18/2022)      gabapentin (NEURONTIN) 300 mg capsule Take 300 mg by mouth three (3) times daily. (Patient not taking: Reported on 2/18/2022)      albuterol (PROAIR HFA) 90 mcg/actuation inhaler Take  by inhalation. LORazepam (ATIVAN) 0.5 mg tablet Take 1 Tab by mouth every twelve (12) hours as needed for Anxiety. Max Daily Amount: 1 mg. 60 Tab 0       Past History     Past Medical History:  Past Medical History:   Diagnosis Date    Anxiety     Asthma     well controlled    Cancer ([address]     left ovary h/o    Coagulation disorder ([address]     Dyspepsia and other specified disorders of function of stomach     Hypokalemia 9/8/2014    Morbid obesity ([address]     Ovarian cancer ([address]     PIH (pregnancy induced hypertension)     Pink eye disease, left        Past Surgical History:  Past Surgical History:   Procedure Laterality Date    HX ADENOIDECTOMY Bilateral 10/2005    HX OOPHORECTOMY      left    HX POLYPECTOMY  1993    rectal       Family History:  Family History   Problem Relation Age of Onset    Cancer Father     Cancer Paternal Uncle        Social History:  Social History     Tobacco Use    Smoking status: Never    Smokeless tobacco: Never   Vaping Use    Vaping Use: Never used   Substance Use Topics    Alcohol use: Yes     Alcohol/week: 0.0 standard drinks     Comment: 1-2 mixed drinks monthly    Drug use: No       Allergies:   Allergies   Allergen Reactions    Penicillins Hives         Review of Systems     Review of Systems   Constitutional:  Positive for activity change and appetite change. Negative for fever. HENT: Negative. Eyes: Negative. Respiratory: Negative. Cardiovascular: Negative. Gastrointestinal:  Positive for nausea. Negative for vomiting. Endocrine: Negative. Genitourinary: Negative. Musculoskeletal: Negative. Skin: Negative. Allergic/Immunologic: Negative. Neurological: Negative. Hematological: Negative. Psychiatric/Behavioral: Negative. All other systems reviewed and are negative. Physical Exam     Physical Exam  Vitals and nursing note reviewed. Constitutional:       General: She is not in acute distress. Appearance: She is well-developed. She is not diaphoretic. HENT:      Head: Normocephalic. Right Ear: External ear normal.      Left Ear: External ear normal.      Mouth/Throat:      Pharynx: No oropharyngeal exudate. Eyes:      General: No scleral icterus. Right eye: No discharge. Left eye: No discharge. Conjunctiva/sclera: Conjunctivae normal.      Pupils: Pupils are equal, round, and reactive to light. Neck:      Thyroid: No thyromegaly. Vascular: No JVD. Trachea: No tracheal deviation. Cardiovascular:      Rate and Rhythm: Normal rate and regular rhythm. Heart sounds: Normal heart sounds. No murmur heard. No friction rub. No gallop. Pulmonary:      Effort: Pulmonary effort is normal. No respiratory distress. Breath sounds: Normal breath sounds. No stridor. No wheezing or rales. Chest:      Chest wall: No tenderness. Abdominal:      General: Bowel sounds are normal. There is no distension. Palpations: Abdomen is soft. There is no mass. Tenderness: There is no abdominal tenderness. There is no guarding or rebound. Musculoskeletal:         General: No tenderness. Normal range of motion. Cervical back: Normal range of motion and neck supple.    Lymphadenopathy: Cervical: No cervical adenopathy. Skin:     General: Skin is warm and dry. Coloration: Skin is not pale. Findings: No erythema or rash. Neurological:      Mental Status: She is alert and oriented to person, place, and time. Cranial Nerves: No cranial nerve deficit. Motor: No abnormal muscle tone. Coordination: Coordination normal.      Deep Tendon Reflexes: Reflexes normal.       Lab and Diagnostic Study Results     Labs -  No results found for this or any previous visit (from the past 24 hour(s)). Radiologic Studies -   No orders to display         Procedures and Critical Care       Performed by: Dominik Ventura MD    Procedures         Dominik Ventura MD    Medical Decision Making and ED Course   - I am the first and primary provider for this patient AND AM THE PRIMARY PROVIDER OF RECORD. - I reviewed the vital signs, available nursing notes, past medical history, past surgical history, family history and social history. - Initial assessment performed. The patients presenting problems have been discussed, and the staff are in agreement with the care plan formulated and outlined with them. I have encouraged them to ask questions as they arise throughout their visit. Vital Signs-Reviewed the patient's vital signs. Patient Vitals for the past 12 hrs:   Temp Pulse Resp BP SpO2   12/15/22 2300 99.7 °F (37.6 °C) -- 18 132/78 --   12/15/22 2257 -- (!) 106 20 -- 99 %         Provider Notes (Medical Decision Making): JIGNESH Vazquez-19, viral syndrome,, flu    MDM     ED Course: With IV hydration and IV Zofran. Reassessed patient prior to discharge: Patient asymptomatic.     ------------------------------------------------------------------------------------------------------------        Consultations:       Consultations: none      Disposition     Rx: Jori Keys. F/U PCP prn . Return to ER prn.         Diagnosis     Clinical Impression: viral syndrome      Nieves Fields MD

## 2022-12-16 NOTE — ED NOTES
Discharge instruction reviewed with patient. Patient verbalized understanding. Patient ambulated from ED treatment area without requiring assistance.

## 2022-12-16 NOTE — PROGRESS NOTES
Ketorolac 15 mg IV was therapeutically interchanged for Ketorolac 30 mg IV per the P&T Committee approved Therapeutic Interchanges Policy.

## 2023-11-10 ENCOUNTER — TELEPHONE (OUTPATIENT)
Age: 33
End: 2023-11-10

## 2023-11-21 ENCOUNTER — OFFICE VISIT (OUTPATIENT)
Age: 33
End: 2023-11-21
Payer: COMMERCIAL

## 2023-11-21 VITALS
BODY MASS INDEX: 43.61 KG/M2 | WEIGHT: 237 LBS | TEMPERATURE: 97.9 F | DIASTOLIC BLOOD PRESSURE: 87 MMHG | HEIGHT: 62 IN | OXYGEN SATURATION: 94 % | SYSTOLIC BLOOD PRESSURE: 134 MMHG | RESPIRATION RATE: 16 BRPM | HEART RATE: 80 BPM

## 2023-11-21 DIAGNOSIS — E66.01 OBESITY, MORBID (HCC): Primary | ICD-10-CM

## 2023-11-21 DIAGNOSIS — Z71.89 ENCOUNTER FOR PRE-BARIATRIC SURGERY COUNSELING AND EDUCATION: ICD-10-CM

## 2023-11-21 DIAGNOSIS — E11.9 TYPE 2 DIABETES MELLITUS WITHOUT COMPLICATION, WITHOUT LONG-TERM CURRENT USE OF INSULIN (HCC): ICD-10-CM

## 2023-11-21 DIAGNOSIS — K21.9 GASTRO-ESOPHAGEAL REFLUX DISEASE WITHOUT ESOPHAGITIS: ICD-10-CM

## 2023-11-21 DIAGNOSIS — J45.909 ASTHMA, UNSPECIFIED ASTHMA SEVERITY, UNSPECIFIED WHETHER COMPLICATED, UNSPECIFIED WHETHER PERSISTENT: ICD-10-CM

## 2023-11-21 DIAGNOSIS — G47.30 SLEEP APNEA, UNSPECIFIED TYPE: ICD-10-CM

## 2023-11-21 PROCEDURE — 3046F HEMOGLOBIN A1C LEVEL >9.0%: CPT | Performed by: SURGERY

## 2023-11-21 PROCEDURE — G8427 DOCREV CUR MEDS BY ELIG CLIN: HCPCS | Performed by: SURGERY

## 2023-11-21 PROCEDURE — G8419 CALC BMI OUT NRM PARAM NOF/U: HCPCS | Performed by: SURGERY

## 2023-11-21 PROCEDURE — 1036F TOBACCO NON-USER: CPT | Performed by: SURGERY

## 2023-11-21 PROCEDURE — G8484 FLU IMMUNIZE NO ADMIN: HCPCS | Performed by: SURGERY

## 2023-11-21 PROCEDURE — 99204 OFFICE O/P NEW MOD 45 MIN: CPT | Performed by: SURGERY

## 2023-11-21 PROCEDURE — 2022F DILAT RTA XM EVC RTNOPTHY: CPT | Performed by: SURGERY

## 2023-12-06 ENCOUNTER — TELEPHONE (OUTPATIENT)
Age: 33
End: 2023-12-06

## 2023-12-06 NOTE — TELEPHONE ENCOUNTER
I spoke to the patient regarding r/s of her EGD that was scheduled for 12/15/2023, but she asked it to be r/s.  It had been r/s for 1/19/24.    Also the patient informed me that she no longer has [facility].    As for now the patient only has [place] [employer].   Member ID# [id number].    Monalisa, please update the patient insurance information.    Thank you.     Sarah.

## 2023-12-08 ENCOUNTER — CLINICAL DOCUMENTATION (OUTPATIENT)
Facility: HOSPITAL | Age: 33
End: 2023-12-08

## 2023-12-08 ENCOUNTER — HOSPITAL ENCOUNTER (OUTPATIENT)
Facility: HOSPITAL | Age: 33
Discharge: HOME | End: 2023-12-11

## 2024-01-09 ENCOUNTER — HOSPITAL ENCOUNTER (OUTPATIENT)
Facility: HOSPITAL | Age: 34
Discharge: HOME | End: 2024-01-12

## 2024-01-09 ENCOUNTER — CLINICAL DOCUMENTATION (OUTPATIENT)
Facility: HOSPITAL | Age: 34
End: 2024-01-09

## 2024-01-09 NOTE — PROGRESS NOTES
[facility]  [address], [address]    Patient's Name: Rosa Houston   Age: 33 y.o.  YOB: 1990   Sex: female       Session: 2 of  6  Surgeon:  Dr. Mora    Height: 5 f 2   Weight:    230      Lbs.     BMI:    Pounds Lost since last month: 3                 Pounds Gained since last month: 0    Starting Weight: 237     Previous Month’s Weight: 233  Overall Pounds Lost: 7   Overall Pounds Gained: 0    Do you smoke?  None    Alcohol intake:  Number of drinks at a time:  1  x a month or less  Number of times a week:     Class Guidelines    Guidelines are reviewed with patient at the start of every class.    1. Patient understands that weight loss trial classes must be consecutive.  Patient understands if they miss a class, it is their responsibility to contact me to reschedule class.  I will reach out to patient after their first no show.  2.  Patient understands the expectations that weight maintenance/weight loss is expected during the classes.  Failure to demonstrate changes may result in one extra month of weight loss trial, followed by going back to see the surgeon.    3. Patient is also instructed to be doing their labs, blood work, psych visit, support group and any other test that the surgeon has used while they are working on their weight loss trial.    Other Pertinent Information:     Patient has not attended a support group meeting.     Changes Made Since Last Class: Started eating breakfast    Eating Habits and Behaviors      Today we reviewed key diet principles.   We talked about snack ideas that would focus more on protein.  We also talked about the benefits of filling up on protein first and keeping the daily carbohydrate intake to less than 75 grams per day.  Patient was instructed to increase fluid intake to 64 ounces per day and stop all carbonation, caffeine, and sugary drinks.  During class, we talked about the

## 2024-01-10 ENCOUNTER — TELEPHONE (OUTPATIENT)
Age: 34
End: 2024-01-10

## 2024-01-10 NOTE — PROGRESS NOTES
Instructions for your surgery at [facility]      Today's Date:  1/10/2024      Patient's Name:  Rosa Houston           Surgery Date:  01/15/2024              Please enter the main entrance of the hospital and check-in at the  located in the lobby. Once checked in at the , you will take the elevators to the second floor, and report to the waiting room on the left. The room will say Procedure Registration.    Do NOT eat or drink anything, including candy, gum, or ice chips after midnight prior to your surgery, unless you have specific instructions from your surgeon or anesthesia provider to do so.  Brush your teeth before coming to the hospital. You may swish with water, but do not swallow.  No smoking/Vaping/E-Cigarettes 24 hours prior to the day of surgery.  No alcohol 24 hours prior to the day of surgery.  No recreational drugs for one week prior to the day of surgery.  Bring Photo ID, Insurance information, and Co-pay if required on day of surgery.  Bring in pertinent legal documents, such as, Medical Power of , DNR, Advance Directive, etc.  Leave all valuables, including money/purse, at home.  Remove all jewelry, including ALL body piercings, nail polish, acrylic nails, and makeup (including mascara); no lotions, powders, deodorant, or perfume/cologne/after shave on the skin.  Follow instruction for Hibiclens washes and CHG wipes from surgeon's office.   Glasses and dentures may be worn to the hospital. They must be removed prior to surgery. Please bring case/container for glasses or dentures.   Contact lenses should not be worn on day of surgery.   Call your doctor's office if symptoms of a cold or illness develop within 24-48 hours prior to your surgery.  Call your doctor's office if you have any questions concerning insurance or co-pays.  15. AN ADULT (relative or friend 18 years or older) MUST DRIVE YOU HOME AFTER YOUR SURGERY.  16. Please make

## 2024-01-10 NOTE — TELEPHONE ENCOUNTER
I called and LVM this is regarding arrival time @ 10:30 am @ [facility] for EGD w/Morgan on 1/15/2024.    On 1/4/2024 I sent an e-mail and a message through [employer] regarding this appt.     Patient called back and confirm appt on 1/15/2024    Sarah

## 2024-01-12 ENCOUNTER — ANESTHESIA EVENT (OUTPATIENT)
Facility: HOSPITAL | Age: 34
End: 2024-01-12
Payer: MEDICAID

## 2024-01-15 ENCOUNTER — ANESTHESIA (OUTPATIENT)
Facility: HOSPITAL | Age: 34
End: 2024-01-15
Payer: MEDICAID

## 2024-01-15 ENCOUNTER — HOSPITAL ENCOUNTER (OUTPATIENT)
Facility: HOSPITAL | Age: 34
Setting detail: OUTPATIENT SURGERY
Discharge: HOME | End: 2024-01-15
Attending: SURGERY | Admitting: SURGERY
Payer: MEDICAID

## 2024-01-15 VITALS
RESPIRATION RATE: 18 BRPM | WEIGHT: 229 LBS | BODY MASS INDEX: 42.14 KG/M2 | TEMPERATURE: 98.3 F | DIASTOLIC BLOOD PRESSURE: 83 MMHG | HEIGHT: 62 IN | OXYGEN SATURATION: 99 % | HEART RATE: 81 BPM | SYSTOLIC BLOOD PRESSURE: 129 MMHG

## 2024-01-15 LAB — HCG UR QL: NEGATIVE

## 2024-01-15 PROCEDURE — 3600007512: Performed by: SURGERY

## 2024-01-15 PROCEDURE — 6360000002 HC RX W HCPCS: Performed by: NURSE ANESTHETIST, CERTIFIED REGISTERED

## 2024-01-15 PROCEDURE — 2580000003 HC RX 258: Performed by: NURSE ANESTHETIST, CERTIFIED REGISTERED

## 2024-01-15 PROCEDURE — 2709999900 HC NON-CHARGEABLE SUPPLY: Performed by: SURGERY

## 2024-01-15 PROCEDURE — 3700000000 HC ANESTHESIA ATTENDED CARE: Performed by: SURGERY

## 2024-01-15 PROCEDURE — 81025 URINE PREGNANCY TEST: CPT

## 2024-01-15 PROCEDURE — 3700000001 HC ADD 15 MINUTES (ANESTHESIA): Performed by: SURGERY

## 2024-01-15 PROCEDURE — 43239 EGD BIOPSY SINGLE/MULTIPLE: CPT | Performed by: SURGERY

## 2024-01-15 PROCEDURE — 88305 TISSUE EXAM BY PATHOLOGIST: CPT

## 2024-01-15 PROCEDURE — 88342 IMHCHEM/IMCYTCHM 1ST ANTB: CPT

## 2024-01-15 PROCEDURE — 3600007502: Performed by: SURGERY

## 2024-01-15 PROCEDURE — 2500000003 HC RX 250 WO HCPCS: Performed by: NURSE ANESTHETIST, CERTIFIED REGISTERED

## 2024-01-15 PROCEDURE — 7100000000 HC PACU RECOVERY - FIRST 15 MIN: Performed by: SURGERY

## 2024-01-15 PROCEDURE — 7100000001 HC PACU RECOVERY - ADDTL 15 MIN: Performed by: SURGERY

## 2024-01-15 PROCEDURE — A4216 STERILE WATER/SALINE, 10 ML: HCPCS | Performed by: NURSE ANESTHETIST, CERTIFIED REGISTERED

## 2024-01-15 PROCEDURE — 7100000010 HC PHASE II RECOVERY - FIRST 15 MIN: Performed by: SURGERY

## 2024-01-15 RX ORDER — LIDOCAINE HYDROCHLORIDE 10 MG/ML
1 INJECTION, SOLUTION EPIDURAL; INFILTRATION; INTRACAUDAL; PERINEURAL
Status: COMPLETED | OUTPATIENT
Start: 2024-01-15 | End: 2024-01-15

## 2024-01-15 RX ORDER — SODIUM CHLORIDE, SODIUM LACTATE, POTASSIUM CHLORIDE, CALCIUM CHLORIDE 600; 310; 30; 20 MG/100ML; MG/100ML; MG/100ML; MG/100ML
INJECTION, SOLUTION INTRAVENOUS CONTINUOUS
Status: DISCONTINUED | OUTPATIENT
Start: 2024-01-15 | End: 2024-01-15 | Stop reason: HOSPADM

## 2024-01-15 RX ORDER — PROPOFOL 10 MG/ML
INJECTION, EMULSION INTRAVENOUS PRN
Status: DISCONTINUED | OUTPATIENT
Start: 2024-01-15 | End: 2024-01-15 | Stop reason: SDUPTHER

## 2024-01-15 RX ADMIN — PROPOFOL 50 MG: 10 INJECTION, EMULSION INTRAVENOUS at 11:46

## 2024-01-15 RX ADMIN — PROPOFOL 75 MG: 10 INJECTION, EMULSION INTRAVENOUS at 11:39

## 2024-01-15 RX ADMIN — PROPOFOL 50 MG: 10 INJECTION, EMULSION INTRAVENOUS at 11:45

## 2024-01-15 RX ADMIN — LIDOCAINE HYDROCHLORIDE 50 ML: 10 INJECTION, SOLUTION EPIDURAL; INFILTRATION; INTRACAUDAL; PERINEURAL at 11:39

## 2024-01-15 RX ADMIN — PROPOFOL 50 MG: 10 INJECTION, EMULSION INTRAVENOUS at 11:41

## 2024-01-15 RX ADMIN — SODIUM CHLORIDE, POTASSIUM CHLORIDE, SODIUM LACTATE AND CALCIUM CHLORIDE: 600; 310; 30; 20 INJECTION, SOLUTION INTRAVENOUS at 11:09

## 2024-01-15 RX ADMIN — PROPOFOL 25 MG: 10 INJECTION, EMULSION INTRAVENOUS at 11:44

## 2024-01-15 RX ADMIN — FAMOTIDINE 20 MG: 10 INJECTION, SOLUTION INTRAVENOUS at 11:10

## 2024-01-15 RX ADMIN — PROPOFOL 50 MG: 10 INJECTION, EMULSION INTRAVENOUS at 11:43

## 2024-01-15 ASSESSMENT — ENCOUNTER SYMPTOMS: SHORTNESS OF BREATH: 1

## 2024-01-15 ASSESSMENT — PAIN - FUNCTIONAL ASSESSMENT: PAIN_FUNCTIONAL_ASSESSMENT: 0-10

## 2024-01-15 NOTE — ANESTHESIA PRE PROCEDURE
Substance Use Topics   • Alcohol use: Yes     Alcohol/week: 1.0 standard drink of alcohol     Types: 1 Glasses of wine per week     Comment: socially                                Counseling given: Not Answered      Vital Signs (Current):   Vitals:    01/10/24 1012 01/15/24 1058   BP:  (!) 146/80   Pulse:  89   Resp:  20   Temp:  98.3 °F (36.8 °C)   TempSrc:  Oral   SpO2:  99%   Weight: 104.3 kg (230 lb) 103.9 kg (229 lb)   Height: 1.575 m (5' 2\") 1.575 m (5' 2\")                                              BP Readings from Last 3 Encounters:   01/15/24 (!) 146/80   11/21/23 134/87   02/18/22 (!) 141/95       NPO Status: Time of last liquid consumption: 2100                        Time of last solid consumption: 1900                        Date of last liquid consumption: 01/14/24                        Date of last solid food consumption: 01/14/24    BMI:   Wt Readings from Last 3 Encounters:   01/15/24 103.9 kg (229 lb)   11/21/23 107.5 kg (237 lb)   02/18/22 101.2 kg (223 lb)     Body mass index is 41.88 kg/m².    CBC:   Lab Results   Component Value Date/Time    WBC 7.9 12/15/2022 11:26 PM    RBC 4.98 12/15/2022 11:26 PM    HGB 13.9 12/15/2022 11:26 PM    HCT 40.7 12/15/2022 11:26 PM    MCV 81.7 12/15/2022 11:26 PM    RDW 13.2 12/15/2022 11:26 PM     12/15/2022 11:26 PM       CMP:   Lab Results   Component Value Date/Time     12/15/2022 11:26 PM    K 4.1 12/15/2022 11:26 PM     12/15/2022 11:26 PM    CO2 25 12/15/2022 11:26 PM    BUN 14 12/15/2022 11:26 PM    CREATININE 0.88 12/15/2022 11:26 PM    GFRAA >60 01/12/2021 09:09 AM    AGRATIO 0.7 12/15/2022 11:26 PM    GLUCOSE 116 12/15/2022 11:26 PM    PROT 8.2 12/15/2022 11:26 PM    CALCIUM 8.9 12/15/2022 11:26 PM    BILITOT 0.6 12/15/2022 11:26 PM    ALKPHOS 56 12/15/2022 11:26 PM    AST 50 12/15/2022 11:26 PM    ALT 32 12/15/2022 11:26 PM       POC Tests: No results for input(s): \"POCGLU\", \"POCNA\", \"POCK\", \"POCCL\", \"POCBUN\", \"POCHEMO\",

## 2024-01-15 NOTE — ANESTHESIA POSTPROCEDURE EVALUATION
Department of Anesthesiology  Postprocedure Note    Patient: Rosa Houston  MRN: [id number]  YOB: 1990  Date of evaluation: 1/15/2024    Procedure Summary       Date: 01/15/24 Room / Location: [facility] ENDO 01 / [facility] ENDOSCOPY    Anesthesia Start: 1135 Anesthesia Stop: 1200    Procedure: EGD ESOPHAGOGASTRODUODENOSCOPY w/ bxs (Upper GI Region) Diagnosis:       Morbid obesity (HCC)      Gastroesophageal reflux disease, unspecified whether esophagitis present      Asthma, unspecified asthma severity, unspecified whether complicated, unspecified whether persistent      Sleep apnea, unspecified type      Encounter for pre-bariatric surgery counseling and education      (Morbid obesity (HCC) [E66.01])      (Gastroesophageal reflux disease, unspecified whether esophagitis present [K21.9])      (Asthma, unspecified asthma severity, unspecified whether complicated, unspecified whether persistent [J45.909])      (Sleep apnea, unspecified type [G47.30])      (Encounter for pre-bariatric surgery counseling and education [Z71.89])    Surgeons: Merlin Mora MD Responsible Provider: Sulaiman Omalley MD    Anesthesia Type: MAC ASA Status: 3            Anesthesia Type: MAC    Emma Phase I: Emma Score: 10    Emma Phase II:      Anesthesia Post Evaluation    Patient location during evaluation: PACU  Patient participation: complete - patient participated  Level of consciousness: awake and alert  Airway patency: patent  Nausea & Vomiting: no nausea and no vomiting  Cardiovascular status: hemodynamically stable  Respiratory status: acceptable  Hydration status: stable  Multimodal analgesia pain management approach    No notable events documented.

## 2024-01-15 NOTE — H&P
The patient is a 33 y.o. obese  female with a Body mass index is 43.35 kg/m².. They have been considering surgery for some time now. The patient presents to the clinic today to discuss surgical weight loss options. They have made multiple attempts at weight loss over the years without success. They have tried low-carb, low calorie, high-protein diets, phentermine, ozempic. Unfortunately, none of these have lead to meaningful, sustained weight loss. They have attended the bariatric seminar before the visit.      History of bilateral ovarian germ cell tumors, sp lap left oophorectomy and later right open oophorectomy via low midline laparotomy.      Uses NSAIDs PRN but limits this due to family history of kidney disease. Uses medical marijuana (topical and edible) PRN for pain and anxiety     DIET:   3 meals per day, moderate to large portion sizes, variable hunger drive  Grazing behaviors: cookies, ice cream, chips, crackers     Denies nausea, vomiting, dysphagia  Reports reflux, triggered by red sauces, citrus, spicy foods, and without those exposures, no symptoms. Takes protonix routinely and has no symptoms even with eating these foods.      EXERCISE:   Walking regimen     Sleep Apnea assessment:     STOPBANG questionnaire     Do you Snore loudly? yes  Do you often feel Tired, fatigued, or sleepy during the daytime? yes  Has anyone Observed you stop breathing during your sleep? yes  Are you being treated for high blood Pressure? yes  BMI more than 35 kg/m2? yes  Age over 50 years old? no  Neck Circumference >16 inches? no  Gender male? no  ______________________________________     SCORE: 5     If YES to 0 - 2, low risk of sleep apnea  If YES to 3 - 4 intermediate risk of having sleep apnea  If YES to 5 - 8 high risk of having sleep apnea (or 2 + BMI 35 or Neck > 17\" or Male)           Patient Active Problem List     Diagnosis Date Noted    Obesity, morbid (HCC) 05/07/2018    Vaginal candidiasis

## 2024-01-15 NOTE — OP NOTE
Operative Report    Patient: Rosa Houston MRN: [id number]  SSN: [id number]    YOB: 1990  Age: 33 y.o.  Sex: female       Date of Surgery: 01/15/24     Preoperative Diagnosis:    Obesity, morbid (HCC) Yes    Gastro-esophageal reflux disease without esophagitis      Asthma, unspecified asthma severity, unspecified whether complicated, unspecified whether persistent      Encounter for pre-bariatric surgery counseling and education      Sleep apnea, unspecified type            Postoperative Diagnosis:    Obesity, morbid (HCC)    Gastro-esophageal reflux disease without esophagitis    Asthma, unspecified asthma severity, unspecified whether complicated, unspecified whether persistent    Encounter for pre-bariatric surgery counseling and education    Sleep apnea, unspecified type   Hiatal hernia  Gastritis, mild    Surgeon(s) and Role:     * Merlin Mora MD - Primary    Anesthesia: Monitor Anesthesia Care     Procedure:   Esophagogastroduodenoscopy with biopsy    Procedure in Detail: Rosa Houston was identified in the pre-operative holding area. Informed consent was obtained after a complete discussion of risks, benefits and alternatives to surgery were had with the patient.    The patient was brought back to the endoscopy room and placed under MAC in the supine position on the operating room table. A timeout was performed verifying patient identity, planned procedure, medications, and all other pertinent aspects of the case.  The patient was appropriately padded and secured to the table. A bite block was applied.     Once adequate sedation was achieved, the gastroscope was introduced transorally into the esophagus. The esophagus was traversed.     Irregular Z line    A hiatal/paraesophageal hernia was apparent with respiratory migration of the Z line above the diaphragmatic pinch. Hill grade 3 hiatal anatomy on retroflexed view of the hiatus.    The gastric lumen was insufflated.    There was no

## 2024-02-09 ENCOUNTER — HOSPITAL ENCOUNTER (OUTPATIENT)
Facility: HOSPITAL | Age: 34
End: 2024-02-09
Attending: SURGERY
Payer: MEDICAID

## 2024-02-09 ENCOUNTER — HOSPITAL ENCOUNTER (OUTPATIENT)
Facility: HOSPITAL | Age: 34
End: 2024-02-09
Payer: MEDICAID

## 2024-02-09 ENCOUNTER — HOSPITAL ENCOUNTER (OUTPATIENT)
Facility: HOSPITAL | Age: 34
Discharge: HOME | End: 2024-02-12

## 2024-02-09 DIAGNOSIS — E11.9 TYPE 2 DIABETES MELLITUS WITHOUT COMPLICATION, WITHOUT LONG-TERM CURRENT USE OF INSULIN (HCC): ICD-10-CM

## 2024-02-09 DIAGNOSIS — K21.9 GASTRO-ESOPHAGEAL REFLUX DISEASE WITHOUT ESOPHAGITIS: ICD-10-CM

## 2024-02-09 DIAGNOSIS — Z71.89 ENCOUNTER FOR PRE-BARIATRIC SURGERY COUNSELING AND EDUCATION: ICD-10-CM

## 2024-02-09 DIAGNOSIS — G47.30 SLEEP APNEA, UNSPECIFIED TYPE: ICD-10-CM

## 2024-02-09 DIAGNOSIS — J45.909 ASTHMA, UNSPECIFIED ASTHMA SEVERITY, UNSPECIFIED WHETHER COMPLICATED, UNSPECIFIED WHETHER PERSISTENT: ICD-10-CM

## 2024-02-09 DIAGNOSIS — E66.01 OBESITY, MORBID (HCC): ICD-10-CM

## 2024-02-09 LAB
EKG ATRIAL RATE: 72 BPM
EKG DIAGNOSIS: NORMAL
EKG P AXIS: 59 DEGREES
EKG P-R INTERVAL: 152 MS
EKG Q-T INTERVAL: 406 MS
EKG QRS DURATION: 94 MS
EKG QTC CALCULATION (BAZETT): 444 MS
EKG R AXIS: 38 DEGREES
EKG T AXIS: 28 DEGREES
EKG VENTRICULAR RATE: 72 BPM
SENTARA SPECIMEN COLLECTION: NORMAL

## 2024-02-09 PROCEDURE — 74246 X-RAY XM UPR GI TRC 2CNTRST: CPT

## 2024-02-09 PROCEDURE — 93005 ELECTROCARDIOGRAM TRACING: CPT

## 2024-02-09 PROCEDURE — 93010 ELECTROCARDIOGRAM REPORT: CPT | Performed by: INTERNAL MEDICINE

## 2024-02-09 PROCEDURE — 6370000000 HC RX 637 (ALT 250 FOR IP): Performed by: SURGERY

## 2024-02-09 PROCEDURE — 2500000003 HC RX 250 WO HCPCS: Performed by: SURGERY

## 2024-02-09 RX ADMIN — ANTACID/ANTIFLATULENT 1 EACH: 380; 550; 10; 10 GRANULE, EFFERVESCENT ORAL at 09:40

## 2024-02-09 RX ADMIN — BARIUM SULFATE 140 ML: 980 POWDER, FOR SUSPENSION ORAL at 09:40

## 2024-02-13 ENCOUNTER — HOSPITAL ENCOUNTER (OUTPATIENT)
Facility: HOSPITAL | Age: 34
Discharge: HOME | End: 2024-02-16

## 2024-02-13 ENCOUNTER — CLINICAL DOCUMENTATION (OUTPATIENT)
Facility: HOSPITAL | Age: 34
End: 2024-02-13

## 2024-02-13 NOTE — PROGRESS NOTES
[facility]  [address], [address]    Patient's Name: Rosa Houston   Age: 33 y.o.  YOB: 1990   Sex: female    Date:   2/13/2024              Session: 3 of  6  Surgeon:  Dr. Mora    Height: 5 f 2   Weight:    230      Lbs.     BMI:    Pounds Lost since last month: 0                 Pounds Gained since last month: 0    Starting Weight: 237     Previous Month’s Weight: 230  Overall Pounds Lost: 7   Overall Pounds Gained: 0    Patient has not been to a support group meeting.    Do you smoke?  None    Alcohol intake:  Number of drinks at a time:  None  Number of times a week: None    Class Guidelines    Guidelines are reviewed with patient at the start of every class.    1. Patient understands that weight loss trial classes must be consecutive.  Patient understands if they miss a class, it is their responsibility to contact me to reschedule class.  I will reach out to patient after their first no show.  2.  Patient understands the expectations that weight maintenance/weight loss is expected during the classes.  Failure to demonstrate changes may result in one extra month of weight loss trial, followed by going back to see the surgeon.    3. Patient is also instructed to be doing their labs, blood work, psych visit, support group and any other test that the surgeon has used while they are working on their weight loss trial.   Patient understands that they CAN NOT gain any weight during the weight loss trial.  Gaining weight will result in extra classes    Other Pertinent Information:     Changes Made Since Last Class: down loaded My Fitness Pal, meal prep, bigger breakfast    Eating Habits and Behaviors      Today we started off class talking about the Key Diet Principles.  Patient was encouraged to start drinking 64 ounces of fluid per day.  Patient was encouraged to start cutting out soda, caffeine, carbonation, sweet tea,

## 2024-02-14 LAB
A/G RATIO: 1.3 RATIO (ref 1.1–2.6)
ALBUMIN SERPL-MCNC: 4.2 G/DL (ref 3.5–5)
ALP BLD-CCNC: 60 U/L (ref 25–115)
ALT SERPL-CCNC: 39 U/L (ref 5–40)
ANION GAP SERPL CALCULATED.3IONS-SCNC: 11 MMOL/L (ref 3–15)
AST SERPL-CCNC: 26 U/L (ref 10–37)
BASOPHILS # BLD: 1 % (ref 0–2)
BASOPHILS ABSOLUTE: 0 K/UL (ref 0–0.2)
BILIRUB SERPL-MCNC: 0.3 MG/DL (ref 0.2–1.2)
BUN BLDV-MCNC: 13 MG/DL (ref 6–22)
CALCIUM SERPL-MCNC: 9.1 MG/DL (ref 8.4–10.5)
CHLORIDE BLD-SCNC: 101 MMOL/L (ref 98–110)
CO2: 27 MMOL/L (ref 20–32)
CREAT SERPL-MCNC: 0.9 MG/DL (ref 0.5–1.2)
EOSINOPHIL # BLD: 3 % (ref 0–6)
EOSINOPHILS ABSOLUTE: 0.2 K/UL (ref 0–0.5)
ESTIMATED AVERAGE GLUCOSE: 134 MG/DL (ref 91–123)
FERRITIN: 57 NG/ML (ref 10–291)
FOLATE: 11.5 NG/ML
GLOBULIN: 3.2 G/DL (ref 2–4)
GLOMERULAR FILTRATION RATE: >60 ML/MIN/1.73 SQ.M.
GLUCOSE: 101 MG/DL (ref 70–99)
HBA1C MFR BLD: 6.3 % (ref 4.8–5.6)
HCT VFR BLD CALC: 40.7 % (ref 35.1–46.5)
HEMOGLOBIN: 13.8 G/DL (ref 11.7–15.5)
IRON % SATURATION: 20 % (ref 20–50)
IRON: 75 MCG/DL (ref 30–160)
LYMPHOCYTES # BLD: 26 % (ref 20–45)
LYMPHOCYTES ABSOLUTE: 1.8 K/UL (ref 1–4.8)
MCH RBC QN AUTO: 28 PG (ref 26–34)
MCHC RBC AUTO-ENTMCNC: 34 G/DL (ref 31–36)
MCV RBC AUTO: 82 FL (ref 80–99)
MONOCYTES ABSOLUTE: 0.7 K/UL (ref 0.1–1)
MONOCYTES: 10 % (ref 3–12)
NEUTROPHILS ABSOLUTE: 4 K/UL (ref 1.8–7.7)
NEUTROPHILS: 60 % (ref 40–75)
PDW BLD-RTO: 13.4 % (ref 10–15.5)
PLATELET # BLD: 365 K/UL (ref 140–440)
PMV BLD AUTO: 10.8 FL (ref 9–13)
POTASSIUM SERPL-SCNC: 4.1 MMOL/L (ref 3.5–5.5)
RBC: 4.98 M/UL (ref 3.8–5.2)
SODIUM BLD-SCNC: 139 MMOL/L (ref 133–145)
THIAMINE BLOOD: 89 NMOL/L (ref 78–185)
TOTAL IRON BINDING CAPACITY: 374 MCG/DL (ref 228–428)
TOTAL PROTEIN: 7.4 G/DL (ref 6.4–8.3)
TSH SERPL DL<=0.05 MIU/L-ACNC: 1.1 MCU/ML (ref 0.27–4.2)
UIBC: 299 MCG/DL (ref 110–370)
VITAMIN B-12: 689 PG/ML (ref 211–911)
VITAMIN D 25-HYDROXY: 16.6 NG/ML (ref 32–100)
WBC: 6.7 K/UL (ref 4–11)

## 2024-02-19 ENCOUNTER — OFFICE VISIT (OUTPATIENT)
Age: 34
End: 2024-02-19
Payer: MEDICAID

## 2024-02-19 VITALS
OXYGEN SATURATION: 97 % | SYSTOLIC BLOOD PRESSURE: 124 MMHG | WEIGHT: 230 LBS | RESPIRATION RATE: 18 BRPM | HEIGHT: 62 IN | BODY MASS INDEX: 42.33 KG/M2 | TEMPERATURE: 98 F | HEART RATE: 84 BPM | DIASTOLIC BLOOD PRESSURE: 84 MMHG

## 2024-02-19 DIAGNOSIS — Z71.89 ENCOUNTER FOR PRE-BARIATRIC SURGERY COUNSELING AND EDUCATION: ICD-10-CM

## 2024-02-19 DIAGNOSIS — E11.9 TYPE 2 DIABETES MELLITUS WITHOUT COMPLICATION, WITHOUT LONG-TERM CURRENT USE OF INSULIN (HCC): ICD-10-CM

## 2024-02-19 DIAGNOSIS — K44.9 HIATAL HERNIA: ICD-10-CM

## 2024-02-19 DIAGNOSIS — J45.909 ASTHMA, UNSPECIFIED ASTHMA SEVERITY, UNSPECIFIED WHETHER COMPLICATED, UNSPECIFIED WHETHER PERSISTENT: ICD-10-CM

## 2024-02-19 DIAGNOSIS — K21.9 GASTRO-ESOPHAGEAL REFLUX DISEASE WITHOUT ESOPHAGITIS: ICD-10-CM

## 2024-02-19 DIAGNOSIS — E66.01 OBESITY, MORBID (HCC): Primary | ICD-10-CM

## 2024-02-19 PROCEDURE — 99214 OFFICE O/P EST MOD 30 MIN: CPT | Performed by: SURGERY

## 2024-02-19 PROCEDURE — 3044F HG A1C LEVEL LT 7.0%: CPT | Performed by: SURGERY

## 2024-02-19 NOTE — PROGRESS NOTES
Chief Complaint   Patient presents with    Follow-up     Bariatric program    1. Have you been to the ER, urgent care clinic since your last visit?  Hospitalized since your last visit?No    2. Have you seen or consulted any other health care providers outside of the [facility] since your last visit?  Include any pap smears or colon screening. Yes  psych and neuro

## 2024-02-19 NOTE — PROGRESS NOTES
Bariatric Surgery Progress Note    CC: Preop appointment for bariatric surgery  Subjective:     Patient presents for a preop appointment for bariatric surgery.     REVIEW:    Lab review:  A1c 6.3%  CBC normal  B1 normal  B12 normal  Folate normal  Vitamin D 16.6, prescribed replacement therapy today  Iron normal  TSH normal  CMP normal    Nutrition: Finished 3/6 required nutrition sessions     Psych: Completed mandatory pre-bariatric surgery psychological evaluation and cleared by psychology service    Attended support group    PCP: Clearance letter/note pending    Sleep study scheduled, 2/28/24    EGD / UGI reviewed / issues:    EGD  FINDINGS:   1. Irregular Z line  2. Hiatal hernia, Hill grade 3 hiatal anatomy on retroflexed view of hiatus  3. No bile reflux or polyps identified  4. Mild linear antral erythema/gastritis, biopsied  5. Normal appearing duodenal bulb/D1    UGI   IMPRESSION:  Gastroesophageal reflux.  Small sliding hiatal hernia.    Denies nausea, vomiting, dysphagia  Reports reflux, triggered by red sauces, citrus, spicy foods, and without those exposures, no symptoms. Takes protonix routinely and has no symptoms even with eating these foods.     Previous relevant surgeries:   History of bilateral ovarian germ cell tumors, sp lap left oophorectomy and later right open oophorectomy via low midline laparotomy.     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

## 2024-03-12 ENCOUNTER — CLINICAL DOCUMENTATION (OUTPATIENT)
Facility: HOSPITAL | Age: 34
End: 2024-03-12

## 2024-03-12 ENCOUNTER — HOSPITAL ENCOUNTER (OUTPATIENT)
Facility: HOSPITAL | Age: 34
Discharge: HOME | End: 2024-03-15

## 2024-03-12 NOTE — PROGRESS NOTES
[facility]  [address], [address]    Patient's Name: Rosa Houston   Age: 33 y.o.  YOB: 1990   Sex: female           Session: 4 of  6  Surgeon: Dr. Mora  Date: 3/12/2024    Height: 5 f 2   Weight:    230      Lbs.     BMI:    Pounds Lost since last month: 7                 Pounds Gained since last month: 0      Starting Weight: 237     Previous Month’s Weight: 230  Overall Pounds Lost: 7   Overall Pounds Gained: 0      Do you smoke?  None    Alcohol intake:  Number of drinks at a time:  None  Number of times a week: None    Patient has attended a support group.  Information was provided.     Class Guidelines    Guidelines are reviewed with patient at the start of every class.    1. Patient understands that weight loss trial classes must be consecutive.  Patient understands if they miss a class, it is their responsibility to contact me to reschedule class.  I will reach out to patient after their first no show.  2.  Patient understands the expectations that weight maintenance/weight loss is expected during the classes.  Failure to demonstrate changes may result in one extra month of weight loss trial, followed by going back to see the surgeon.  Patient understands that they CAN NOT gain any weight during the weight loss trial.  Gaining weight will result in extra classes.  3. Patient is also instructed to be doing their labs, blood work, psych visit, support group and any other test that the surgeon has used while they are working on their weight loss trial.  4.  Patient was instructed to bring their blue binder to every class and appointment.      Other Pertinent Information:     Changes Made Since Last Class:     Eating Habits and Behaviors    Today in class, we reviewed the key diet principles.  I have talked to patient about pushing the fluid and working towards 64 ounces per day.    Patient was given ideas of liquids

## 2024-04-09 ENCOUNTER — HOSPITAL ENCOUNTER (OUTPATIENT)
Facility: HOSPITAL | Age: 34
Discharge: HOME | End: 2024-04-12

## 2024-04-09 ENCOUNTER — CLINICAL DOCUMENTATION (OUTPATIENT)
Facility: HOSPITAL | Age: 34
End: 2024-04-09

## 2024-04-09 NOTE — PROGRESS NOTES
weaning from caffeine.         Physical Activity/Exercise    Comments:     Currently for exercise, patient is walking 30 minutes on the treadmill.  We talked about activities for patient to do, including walking, swimming, or chair exercises.  I also talked with patient about doing some strength training, which helps the metabolism, as well.  Patient understands the importance of establishing an activity routine and that surgery is only a small piece of puzzle, but exercise and diet changes will play a role in long term success.    Behavior Modification       Comments:   In the power point, Mastering Your Metabolism, I also gave behaviors that can help with one's metabolism.  These include not skipping meals and being sure to feed and fuel that body rather than going large gaps and putting the body in starvation mode.  Patient is encouraged to eat within 1 hour of waking up and have a cut off time in the evening.  We talked about night time syndrome where a person may skip breakfast and lunch and then consume the majority of their calories from dinner until bed time.  I  have talked about how important it it to get 3 meals in per day, eat breakfast, and BREAK THE FAST.    One goal for next month includes:  1.  Walk the treadmill.  2. Get weight down to 225.    Alberta Mims, RD  4/2/24

## 2024-05-01 ENCOUNTER — HOSPITAL ENCOUNTER (OUTPATIENT)
Facility: HOSPITAL | Age: 34
Discharge: HOME | End: 2024-05-04

## 2024-05-01 ENCOUNTER — CLINICAL DOCUMENTATION (OUTPATIENT)
Facility: HOSPITAL | Age: 34
End: 2024-05-01

## 2024-05-01 NOTE — PROGRESS NOTES
Nutrition Evaluation    Patient's Name: Rosa Houston   Age: 33 y.o.  YOB: 1990   Sex: female    Height: 5 f 2 Weight: 232 BMI:    Starting Weight:  237        Smoking Status:  None  Alcohol Intake:  Number of Drinks at a Time: 1  Number of Times a Week: 1    Changes made during classes include:  Walking  Watching how much food she eats  Packing her own lunch        Summary:  I feel that Rosa Houston has demonstrated appropriate diet changes and is ready to move forward with surgery.  Patient has been briefed on the importance of the protein drinks, vitamins, and the transition of the diet stages. Patient understands that the long-term diet will focus on protein and vegetables.  Patient understand the effects of carbohydrates after surgery and what reactive hypoglycemia is.      Patient is aware that they will be attending pre-op class 2 weeks before surgery and will get more detailed information on the post-op diet guidelines.    Patient will see me again at 6 weeks post-op. At this 6 week visit, RD will assess how patient is tolerating soft protein and advance to vegetables, if tolerating soft protein without difficulty.  Patient will also see RD again at 9 months post-op.  This visit will assess patient's compliance with current protocol, including diet, vitamins, protein shakes, and exercise.  Post-op diet guidelines will be reinforced.  RD is available for questions and to meet with patient outside of the 6 week and 9 month post-op visit.     We spent a lot of time talking about the vitamins.  Patient understands the importance of being compliant with the diet protocol and the complications and risks that can occur if they are non-compliant with the nutritional protocol.     Patient has attended at least one support group.    Candidate for surgery: Yes  Re-evaluation Date:     Procedure:  Gastric Sleeve    Alberta Mims RD    5/1/2024

## 2024-05-01 NOTE — PROGRESS NOTES
[facility]  [address], [address]    Patient's Name: Rosa Houston   Age: 33 y.o.  YOB: 1990   Sex: female                Session: 6 of  6   Surgeon:  Dr. Mora    Height: 5 f 2   Weight:    232      Lbs.     BMI:    Pounds Lost since last month: 0                 Pounds Gained since last month: 3    Starting Weight: 237     Previous Month’s Weight: 229  Overall Pounds Lost: 5   Overall Pounds Gained: 0    Patient has been to support group.    Do you smoke: None    Alcohol intake:  Number of drinks at a time:  None  Number of times a week: None    Class Guidelines    Guidelines are reviewed with patient at the start of every class.    1. Patient understands that weight loss trial classes must be consecutive.  Patient understands if they miss a class, it is their responsibility to contact me to reschedule class.  I will reach out to patient after their first no show.  2.  Patient understands the expectations that weight maintenance/weight loss is expected during the classes.  Failure to demonstrate changes may result in one extra month of weight loss trial, followed by going back to see the surgeon.  Patient understands that they CAN NOT gain any weight during the weight loss trial.  Gaining weight will result in extra classes.  3. Patient is also instructed to be doing their labs, blood work, psych visit, support group and any other test that the surgeon has used while they are working on their weight loss trial.  4.  Patient was instructed to bring their blue binder to every class and appointment.      Other Pertinent Information:     Changes Made Since Last Class: Walking more    Eating Habits and Behaviors      Today in class we talked about the key diet principles.  We start off each class talking about these principles, which include cutting out liquid calories and focusing on water or other non-calorie, non-carbonated

## 2024-05-06 ENCOUNTER — OFFICE VISIT (OUTPATIENT)
Age: 34
End: 2024-05-06
Payer: MEDICAID

## 2024-05-06 VITALS
RESPIRATION RATE: 18 BRPM | TEMPERATURE: 97.8 F | DIASTOLIC BLOOD PRESSURE: 80 MMHG | HEART RATE: 96 BPM | OXYGEN SATURATION: 98 % | WEIGHT: 231 LBS | HEIGHT: 62 IN | SYSTOLIC BLOOD PRESSURE: 132 MMHG | BODY MASS INDEX: 42.51 KG/M2

## 2024-05-06 DIAGNOSIS — K44.9 HIATAL HERNIA: ICD-10-CM

## 2024-05-06 DIAGNOSIS — Z71.89 ENCOUNTER FOR PRE-BARIATRIC SURGERY COUNSELING AND EDUCATION: ICD-10-CM

## 2024-05-06 DIAGNOSIS — J45.909 ASTHMA, UNSPECIFIED ASTHMA SEVERITY, UNSPECIFIED WHETHER COMPLICATED, UNSPECIFIED WHETHER PERSISTENT: ICD-10-CM

## 2024-05-06 DIAGNOSIS — K21.9 GASTRO-ESOPHAGEAL REFLUX DISEASE WITHOUT ESOPHAGITIS: ICD-10-CM

## 2024-05-06 DIAGNOSIS — E66.01 OBESITY, MORBID (HCC): Primary | ICD-10-CM

## 2024-05-06 DIAGNOSIS — E11.9 TYPE 2 DIABETES MELLITUS WITHOUT COMPLICATION, WITHOUT LONG-TERM CURRENT USE OF INSULIN (HCC): ICD-10-CM

## 2024-05-06 PROCEDURE — 3044F HG A1C LEVEL LT 7.0%: CPT | Performed by: SURGERY

## 2024-05-06 PROCEDURE — 99214 OFFICE O/P EST MOD 30 MIN: CPT | Performed by: SURGERY

## 2024-05-06 RX ORDER — GABAPENTIN 300 MG/1
300 CAPSULE ORAL DAILY
COMMUNITY

## 2024-05-06 NOTE — PROGRESS NOTES
Chief Complaint   Patient presents with    Follow-up     Bariatric program    1. Have you been to the ER, urgent care clinic since your last visit?  Hospitalized since your last visit?No    2. Have you seen or consulted any other health care providers outside of the [facility] since your last visit?  Include any pap smears or colon screening. Yes pcp Pt ID confirmed        5/6/2024     2:15 PM 2/19/2024     9:53 AM 1/15/2024    12:30 PM 1/15/2024    12:16 PM 1/15/2024    12:06 PM 1/15/2024    11:59 AM 1/15/2024    11:56 AM   Ambulatory Bariatric Summary   Systolic  124 129 129 119 122 122   Diastolic  84 83 77 75 83 83   Pulse 96 84 81 76 80 84 77   Temp 97.8 °F (36.6 °C) 98 °F (36.7 °C) 98.3 °F (36.8 °C)   98.9 °F (37.2 °C) 98.9 °F (37.2 °C)   Respirations 18 18 18 18 22 18 20   Weight - Scale 231 230        Height 1.575 m (5' 2\") 1.575 m (5' 2\")        BMI 42.3 kg/m2 42.2 kg/m2        Weight - Scale 104.8 kg (231 lb) 104.3 kg (230 lb)        BMI (Calculated) 42.3 42.2            Body mass index is 42.25 kg/m².

## 2024-05-06 NOTE — PROGRESS NOTES
Bariatric Surgery Progress Note    CC: Preop appointment for bariatric surgery  Subjective:     Patient presents for a preop appointment for bariatric surgery having completed the insurance-mandated and clinically-relevant clearances/counseling.     REVIEW:    Lab review:  A1c 6.3%  CBC normal  B1 normal  B12 normal  Folate normal  Vitamin D 16.6, on replacement therapy  Iron normal  TSH normal  CMP normal  H pylori negative on path report from EGD    Nutrition: Finished all required nutrition sessions and cleared by dietitian    Psych: Completed mandatory pre-bariatric surgery psychological evaluation and cleared by psychology service    Attended support group    PCP: Clearance letter/note in chart    Tested negative for BENOSN on sleep study    EGD / UGI reviewed / issues:     UGI   IMPRESSION:  Gastroesophageal reflux.  Small sliding hiatal hernia.    EGD  FINDINGS:   1. Irregular Z line  2. Hiatal hernia, Hill grade 3 hiatal anatomy on retroflexed view of hiatus  3. No bile reflux or polyps identified  4. Mild linear antral erythema/gastritis, biopsied  5. Normal appearing duodenal bulb/D1    Denies nausea, vomiting, dysphagia  Reports reflux, triggered by red sauces, citrus, spicy foods, and without those exposures, no symptoms. Takes protonix routinely and has no symptoms even with eating these foods.     Previous relevant surgeries:   History of bilateral ovarian germ cell tumors, sp lap left oophorectomy and later right open oophorectomy via low midline laparotomy.     Patient Active Problem List    Diagnosis Date Noted    Obesity, morbid (HCC) 05/07/2018    Vaginal candidiasis 07/18/2016    Gastro-esophageal reflux disease without esophagitis 07/17/2015    Post-menopausal bleeding 05/01/2015    Vaginal bleeding 05/01/2015    Asthma 04/01/2015    Anxiety 02/09/2015    SOB (shortness of breath) 02/09/2015    Stress 02/09/2015    Insomnia 02/09/2015    Arthralgia 02/09/2015    Depression 12/23/2014

## 2024-05-07 ENCOUNTER — FOLLOWUP TELEPHONE ENCOUNTER (OUTPATIENT)
Facility: HOSPITAL | Age: 34
End: 2024-05-07

## 2024-05-07 ENCOUNTER — HOSPITAL ENCOUNTER (OUTPATIENT)
Facility: HOSPITAL | Age: 34
Discharge: HOME | End: 2024-05-10

## 2024-05-07 ENCOUNTER — CLINICAL DOCUMENTATION (OUTPATIENT)
Facility: HOSPITAL | Age: 34
End: 2024-05-07

## 2024-05-07 NOTE — TELEPHONE ENCOUNTER
conduct a brief  physical examination to include vital signs (i.e., blood pressure, pulse, temperature, respirations or  breathing). An intravenous tube will be inserted with IV fluids. Your skin will be cleansed with CHG  wipes. If you wear dentures, eyeglasses or contact lenses you will need to remove them at this time.  You will see your surgeon, your anesthesiologist and meet the members of your surgical team.  Medications, such as antibiotics, may be given by anesthetists to decrease your infection risk.  Other medications may be given to allay any anxiety you may have.  You are allowed to have two family members or friends in the preoperative area before surgery.  Going into Surgery  The operating room team will escort you into the operating room where your abdomen will be  prepared for surgery. There will be a “time out” -- a verification of the correct operative site for  patient safety purposes -- performed. Once in the operating room, monitoring devices, such  as a blood pressure cuff, heart monitor and oxygen monitor, will be attached. You will be given  supplemental oxygen as you are readied for anesthesia.   The average length of time for a laparoscopic sleeve gastrectomy is 60 to 90 minutes.   The average length of time for a Macrina-en-Y gastric bypass is two to two and a half hours.  In the Recovery Area  After your surgery is completed, you will be wheeled into the recovery room. In the recovery room:   Nurses will frequently check your vital signs (i.e., blood pressure, pulse, breathing).   Nurses will medicate you for your pain as needed through the IV line.   Nurses will encourage you to take deep breaths and to move your ankles and feet.  Please inform your family the length of time in the recoveryYou will move to your hospital room from the recovery area when you are ready. Your post-surgical  recovery begins here. room will harika  What to Expect During Your Post-Surgical Care  From the recovery

## 2024-05-07 NOTE — PROGRESS NOTES
if they are non-compliant with the nutritional protocol and non-compliant with the vitamins.     Patient has also been educated on the pre-op liquid diet for 1 week.   Patient understands that failure to comply in pre-op liquid diet could result in surgery being canceled.    Patient's 6 week post-op nutrition appointment has been scheduled.   At this 6 week visit, RD will assess how patient is tolerating soft protein and advance to vegetables, if tolerating soft protein without difficulty.  Patient will also see RD again at 9 months post-op.  This visit will assess patient's compliance with current protocol, including diet, vitamins, protein shakes, and exercise.  Post-op diet guidelines will be reinforced.  RD is available for questions and to meet with patient outside of the 6 week and 9 month post-op visit.    Ok to proceed.     Candidate for surgery: Yes  Re-evaluation Date:     Alberta Mims RD    5/7/2024

## 2024-05-07 NOTE — PROGRESS NOTES
initials: ______________  -- 22 --  PATIENT GUIDE  Patient Acknowledgement of Risks, Benefits,  and Alternatives to Bariatric Surgical Procedures  Patient Name:_________________________________________________________________  :_ _______________________________________________________________________  I am requesting bariatric surgery be performed on me. I believe I may benefit from bariatric  surgery. This form is designed to ensure that I understand the risks, benefits, and alternatives to  having bariatric surgery. Bariatric surgery, or surgery for morbid obesity, is major surgery. The  options available include restrictive procedures, or those that limit digestive capacity. Examples  include vertical sleeve gastrectomy, or the adjustable gastric band (Lap-Band¢ç/Realize™).  Malabsorptive procedures, such as distal gastric bypass produce weight loss by decreasing nutrient  absorption. Biliopancreatic diversion with duodenal switch (BPD/DS) and Macrina-en-Y gastric  bypass combine these two processes to varying degrees. While most procedures can be performed  laparoscopically (through multiple small incisions), there is a possibility that an open technique  may be required (through a large incision). There are alternatives to surgery including medications,  diet and exercise, and behavior modification. I understand that obesity is a chronic disease with no  known cure. I am choosing bariatric surgery as treatment for this disease.  Patient initials: ______________  I am informed of the potential benefits from bariatric surgery which may include improvements  in associated comorbidities (ie; diabetes, obstructive sleep apnea, GERD, high blood pressure),  potential weight loss of 50-80 percent excess weight, and general improvement in quality of life.  The benefits are not guaranteed, and are dependent upon me making the necessary lifestyle  changes for success. I am aware that some patients may not lose as much

## 2024-05-09 NOTE — PERIOP NOTE
Instructions for your surgery at [facility]      Today's Date:  5/9/2024      Patient's Name:  Rosa Houston           Surgery Date:  5/23              Please enter the main entrance of the hospital and check-in at the  located in the lobby. Once checked in at the , you will take the elevators to the second floor, and report to the waiting room on the left. The room will say Procedure Registration.    Do NOT eat or drink anything, including candy, gum, or ice chips after midnight prior to your surgery, unless you have specific instructions from your surgeon or anesthesia provider to do so.  Brush your teeth before coming to the hospital. You may swish with water, but do not swallow.  No smoking/Vaping/E-Cigarettes 24 hours prior to the day of surgery.  No alcohol 24 hours prior to the day of surgery.  No recreational drugs for one week prior to the day of surgery.  Bring Photo ID, Insurance information, and Co-pay if required on day of surgery.  Bring in pertinent legal documents, such as, Medical Power of , DNR, Advance Directive, etc.  Leave all valuables, including money/purse, at home.  Remove all jewelry, including ALL body piercings, nail polish, acrylic nails, and makeup (including mascara); no lotions, powders, deodorant, or perfume/cologne/after shave on the skin.  Follow instruction for Hibiclens washes and CHG wipes from surgeon's office.   Glasses and dentures may be worn to the hospital. They must be removed prior to surgery. Please bring case/container for glasses or dentures.   Contact lenses should not be worn on day of surgery.   Call your doctor's office if symptoms of a cold or illness develop within 24-48 hours prior to your surgery.  Call your doctor's office if you have any questions concerning insurance or co-pays.  15. AN ADULT (relative or friend 18 years or older) MUST DRIVE YOU HOME AFTER YOUR SURGERY.  16. Please make arrangements for a  responsible adult (18 years or older) to be with you for 24 hours after your surgery.   17. ONE VISITOR will be allowed in the waiting area during your surgery.  Exceptions may be made for surgical admissions, per nursing unit guidelines      Special Instructions:      Bring a list of CURRENT medications.  Follow instructions from the office regarding Blood Thinners and/or Insulin  Follow instructions from the office regarding medications to take the morning of surgery.   Bring inhaler.  Bring CPAP machine.  Complete bowel prep per MD instructions.     If you have a history of recreational drug use, you may be required to submit a urine sample for drug testing the day of your procedure, as some recreational drugs can interact with anesthetics and increase your surgical risk.    On day of surgery if you are running late, unable to make procedure time, or sick, please call the Pre-op department at [phone]    These surgical instructions were reviewed with KIET PARTIDA during the PAT phone call.

## 2024-06-18 ENCOUNTER — TELEPHONE (OUTPATIENT)
Age: 34
End: 2024-06-18

## 2024-06-18 NOTE — TELEPHONE ENCOUNTER
The patient was contacted and I confirmed arrival time @ 10:00 am @ [facility] for bariatric Sx. With Dr. Mora on 6/20/2024.    Sarah

## 2024-06-19 ENCOUNTER — ANESTHESIA EVENT (OUTPATIENT)
Facility: HOSPITAL | Age: 34
End: 2024-06-19
Payer: MEDICAID

## 2024-06-20 ENCOUNTER — HOSPITAL ENCOUNTER (INPATIENT)
Facility: HOSPITAL | Age: 34
LOS: 1 days | Discharge: HOME | End: 2024-06-21
Attending: SURGERY | Admitting: SURGERY
Payer: MEDICAID

## 2024-06-20 ENCOUNTER — PREP FOR PROCEDURE (OUTPATIENT)
Age: 34
End: 2024-06-20

## 2024-06-20 ENCOUNTER — ANESTHESIA (OUTPATIENT)
Facility: HOSPITAL | Age: 34
End: 2024-06-20
Payer: MEDICAID

## 2024-06-20 DIAGNOSIS — G89.18 ACUTE POST-OPERATIVE PAIN: Primary | ICD-10-CM

## 2024-06-20 PROBLEM — E66.01 MORBID (SEVERE) OBESITY DUE TO EXCESS CALORIES (HCC): Status: ACTIVE | Noted: 2024-06-20

## 2024-06-20 LAB
ABO + RH BLD: NORMAL
AMPHET UR QL SCN: NEGATIVE
BARBITURATES UR QL SCN: NEGATIVE
BENZODIAZ UR QL: NEGATIVE
BLOOD GROUP ANTIBODIES SERPL: NORMAL
CANNABINOIDS UR QL SCN: POSITIVE
COCAINE UR QL SCN: NEGATIVE
GLUCOSE BLD STRIP.AUTO-MCNC: 149 MG/DL (ref 70–110)
GLUCOSE BLD STRIP.AUTO-MCNC: 94 MG/DL (ref 70–110)
HCG UR QL: NEGATIVE
HCG UR QL: NEGATIVE
Lab: ABNORMAL
METHADONE UR QL: NEGATIVE
OPIATES UR QL: NEGATIVE
PCP UR QL: NEGATIVE
SPECIMEN EXP DATE BLD: NORMAL

## 2024-06-20 PROCEDURE — 2580000003 HC RX 258: Performed by: NURSE ANESTHETIST, CERTIFIED REGISTERED

## 2024-06-20 PROCEDURE — 6360000002 HC RX W HCPCS: Performed by: NURSE ANESTHETIST, CERTIFIED REGISTERED

## 2024-06-20 PROCEDURE — 7100000000 HC PACU RECOVERY - FIRST 15 MIN: Performed by: SURGERY

## 2024-06-20 PROCEDURE — 2580000003 HC RX 258: Performed by: SURGERY

## 2024-06-20 PROCEDURE — 94760 N-INVAS EAR/PLS OXIMETRY 1: CPT

## 2024-06-20 PROCEDURE — 88302 TISSUE EXAM BY PATHOLOGIST: CPT

## 2024-06-20 PROCEDURE — 2500000003 HC RX 250 WO HCPCS: Performed by: NURSE ANESTHETIST, CERTIFIED REGISTERED

## 2024-06-20 PROCEDURE — 6360000002 HC RX W HCPCS

## 2024-06-20 PROCEDURE — 81025 URINE PREGNANCY TEST: CPT

## 2024-06-20 PROCEDURE — 6360000002 HC RX W HCPCS: Performed by: SURGERY

## 2024-06-20 PROCEDURE — C9113 INJ PANTOPRAZOLE SODIUM, VIA: HCPCS | Performed by: SURGERY

## 2024-06-20 PROCEDURE — 86900 BLOOD TYPING SEROLOGIC ABO: CPT

## 2024-06-20 PROCEDURE — 94640 AIRWAY INHALATION TREATMENT: CPT

## 2024-06-20 PROCEDURE — 80307 DRUG TEST PRSMV CHEM ANLYZR: CPT

## 2024-06-20 PROCEDURE — 3700000000 HC ANESTHESIA ATTENDED CARE: Performed by: SURGERY

## 2024-06-20 PROCEDURE — 1100000000 HC RM PRIVATE

## 2024-06-20 PROCEDURE — 2720000010 HC SURG SUPPLY STERILE: Performed by: SURGERY

## 2024-06-20 PROCEDURE — 43775 LAP SLEEVE GASTRECTOMY: CPT | Performed by: SURGERY

## 2024-06-20 PROCEDURE — 7100000001 HC PACU RECOVERY - ADDTL 15 MIN: Performed by: SURGERY

## 2024-06-20 PROCEDURE — 88307 TISSUE EXAM BY PATHOLOGIST: CPT

## 2024-06-20 PROCEDURE — 6370000000 HC RX 637 (ALT 250 FOR IP): Performed by: SURGERY

## 2024-06-20 PROCEDURE — 47379 UNLISTED LAPS PX LIVER: CPT | Performed by: REGISTERED NURSE

## 2024-06-20 PROCEDURE — 43775 LAP SLEEVE GASTRECTOMY: CPT | Performed by: REGISTERED NURSE

## 2024-06-20 PROCEDURE — C9290 INJ, BUPIVACAINE LIPOSOME: HCPCS | Performed by: SURGERY

## 2024-06-20 PROCEDURE — 88313 SPECIAL STAINS GROUP 2: CPT

## 2024-06-20 PROCEDURE — 47100 WEDGE BIOPSY OF LIVER: CPT | Performed by: SURGERY

## 2024-06-20 PROCEDURE — S2900 ROBOTIC SURGICAL SYSTEM: HCPCS | Performed by: SURGERY

## 2024-06-20 PROCEDURE — 0DB64Z3 EXCISION OF STOMACH, PERCUTANEOUS ENDOSCOPIC APPROACH, VERTICAL: ICD-10-PCS | Performed by: SURGERY

## 2024-06-20 PROCEDURE — 3600000009 HC SURGERY ROBOT BASE: Performed by: SURGERY

## 2024-06-20 PROCEDURE — 0DNU4ZZ RELEASE OMENTUM, PERCUTANEOUS ENDOSCOPIC APPROACH: ICD-10-PCS | Performed by: SURGERY

## 2024-06-20 PROCEDURE — 47379 UNLISTED LAPS PX LIVER: CPT | Performed by: SURGERY

## 2024-06-20 PROCEDURE — 8E0W4CZ ROBOTIC ASSISTED PROCEDURE OF TRUNK REGION, PERCUTANEOUS ENDOSCOPIC APPROACH: ICD-10-PCS | Performed by: SURGERY

## 2024-06-20 PROCEDURE — 3600000019 HC SURGERY ROBOT ADDTL 15MIN: Performed by: SURGERY

## 2024-06-20 PROCEDURE — 0FB24ZX EXCISION OF LEFT LOBE LIVER, PERCUTANEOUS ENDOSCOPIC APPROACH, DIAGNOSTIC: ICD-10-PCS | Performed by: SURGERY

## 2024-06-20 PROCEDURE — 0BUT4JZ SUPPLEMENT DIAPHRAGM WITH SYNTHETIC SUBSTITUTE, PERCUTANEOUS ENDOSCOPIC APPROACH: ICD-10-PCS | Performed by: SURGERY

## 2024-06-20 PROCEDURE — 2709999900 HC NON-CHARGEABLE SUPPLY: Performed by: SURGERY

## 2024-06-20 PROCEDURE — 43281 LAP PARAESOPHAG HERN REPAIR: CPT | Performed by: REGISTERED NURSE

## 2024-06-20 PROCEDURE — 43281 LAP PARAESOPHAG HERN REPAIR: CPT | Performed by: SURGERY

## 2024-06-20 PROCEDURE — 82962 GLUCOSE BLOOD TEST: CPT

## 2024-06-20 PROCEDURE — 86901 BLOOD TYPING SEROLOGIC RH(D): CPT

## 2024-06-20 PROCEDURE — 3700000001 HC ADD 15 MINUTES (ANESTHESIA): Performed by: SURGERY

## 2024-06-20 PROCEDURE — 86850 RBC ANTIBODY SCREEN: CPT

## 2024-06-20 RX ORDER — SODIUM CHLORIDE 0.9 % (FLUSH) 0.9 %
5-40 SYRINGE (ML) INJECTION EVERY 12 HOURS SCHEDULED
Status: DISCONTINUED | OUTPATIENT
Start: 2024-06-20 | End: 2024-06-21 | Stop reason: HOSPADM

## 2024-06-20 RX ORDER — DIPHENHYDRAMINE HYDROCHLORIDE 50 MG/ML
25 INJECTION INTRAMUSCULAR; INTRAVENOUS EVERY 6 HOURS PRN
Status: DISCONTINUED | OUTPATIENT
Start: 2024-06-20 | End: 2024-06-21 | Stop reason: HOSPADM

## 2024-06-20 RX ORDER — ONDANSETRON 2 MG/ML
4 INJECTION INTRAMUSCULAR; INTRAVENOUS
Status: DISCONTINUED | OUTPATIENT
Start: 2024-06-20 | End: 2024-06-20 | Stop reason: HOSPADM

## 2024-06-20 RX ORDER — GABAPENTIN 300 MG/1
300 CAPSULE ORAL DAILY
Status: DISCONTINUED | OUTPATIENT
Start: 2024-06-20 | End: 2024-06-21 | Stop reason: HOSPADM

## 2024-06-20 RX ORDER — KETAMINE HCL 50MG/ML(1)
SYRINGE (ML) INTRAVENOUS PRN
Status: DISCONTINUED | OUTPATIENT
Start: 2024-06-20 | End: 2024-06-20 | Stop reason: SDUPTHER

## 2024-06-20 RX ORDER — DEXTROSE MONOHYDRATE 100 MG/ML
INJECTION, SOLUTION INTRAVENOUS CONTINUOUS PRN
Status: DISCONTINUED | OUTPATIENT
Start: 2024-06-20 | End: 2024-06-20 | Stop reason: HOSPADM

## 2024-06-20 RX ORDER — ONDANSETRON 2 MG/ML
INJECTION INTRAMUSCULAR; INTRAVENOUS PRN
Status: DISCONTINUED | OUTPATIENT
Start: 2024-06-20 | End: 2024-06-20 | Stop reason: SDUPTHER

## 2024-06-20 RX ORDER — SIMETHICONE 80 MG
80 TABLET,CHEWABLE ORAL EVERY 6 HOURS PRN
Status: DISCONTINUED | OUTPATIENT
Start: 2024-06-20 | End: 2024-06-21 | Stop reason: HOSPADM

## 2024-06-20 RX ORDER — SODIUM CHLORIDE 0.9 % (FLUSH) 0.9 %
5-40 SYRINGE (ML) INJECTION EVERY 12 HOURS SCHEDULED
Status: DISCONTINUED | OUTPATIENT
Start: 2024-06-20 | End: 2024-06-20 | Stop reason: HOSPADM

## 2024-06-20 RX ORDER — DEXAMETHASONE SODIUM PHOSPHATE 4 MG/ML
INJECTION, SOLUTION INTRA-ARTICULAR; INTRALESIONAL; INTRAMUSCULAR; INTRAVENOUS; SOFT TISSUE PRN
Status: DISCONTINUED | OUTPATIENT
Start: 2024-06-20 | End: 2024-06-20 | Stop reason: SDUPTHER

## 2024-06-20 RX ORDER — ENOXAPARIN SODIUM 100 MG/ML
40 INJECTION SUBCUTANEOUS ONCE
Status: COMPLETED | OUTPATIENT
Start: 2024-06-20 | End: 2024-06-20

## 2024-06-20 RX ORDER — SODIUM CHLORIDE, SODIUM LACTATE, POTASSIUM CHLORIDE, CALCIUM CHLORIDE 600; 310; 30; 20 MG/100ML; MG/100ML; MG/100ML; MG/100ML
INJECTION, SOLUTION INTRAVENOUS CONTINUOUS
Status: DISCONTINUED | OUTPATIENT
Start: 2024-06-20 | End: 2024-06-20 | Stop reason: HOSPADM

## 2024-06-20 RX ORDER — SUCCINYLCHOLINE/SOD CL,ISO/PF 100 MG/5ML
SYRINGE (ML) INTRAVENOUS PRN
Status: DISCONTINUED | OUTPATIENT
Start: 2024-06-20 | End: 2024-06-20 | Stop reason: SDUPTHER

## 2024-06-20 RX ORDER — SODIUM CHLORIDE 0.9 % (FLUSH) 0.9 %
5-40 SYRINGE (ML) INJECTION PRN
Status: CANCELLED | OUTPATIENT
Start: 2024-06-20

## 2024-06-20 RX ORDER — ACETAMINOPHEN 325 MG/1
650 TABLET ORAL EVERY 6 HOURS
Status: DISCONTINUED | OUTPATIENT
Start: 2024-06-20 | End: 2024-06-21 | Stop reason: HOSPADM

## 2024-06-20 RX ORDER — METOCLOPRAMIDE HYDROCHLORIDE 5 MG/ML
10 INJECTION INTRAMUSCULAR; INTRAVENOUS EVERY 6 HOURS PRN
Status: DISCONTINUED | OUTPATIENT
Start: 2024-06-20 | End: 2024-06-21 | Stop reason: HOSPADM

## 2024-06-20 RX ORDER — OXYCODONE HYDROCHLORIDE 5 MG/1
5 TABLET ORAL EVERY 4 HOURS PRN
Status: DISCONTINUED | OUTPATIENT
Start: 2024-06-20 | End: 2024-06-21 | Stop reason: HOSPADM

## 2024-06-20 RX ORDER — PROCHLORPERAZINE EDISYLATE 5 MG/ML
5 INJECTION INTRAMUSCULAR; INTRAVENOUS
Status: DISCONTINUED | OUTPATIENT
Start: 2024-06-20 | End: 2024-06-20 | Stop reason: HOSPADM

## 2024-06-20 RX ORDER — METHOCARBAMOL 750 MG/1
750 TABLET, FILM COATED ORAL EVERY 8 HOURS PRN
Status: DISCONTINUED | OUTPATIENT
Start: 2024-06-20 | End: 2024-06-21 | Stop reason: HOSPADM

## 2024-06-20 RX ORDER — SODIUM CHLORIDE, SODIUM LACTATE, POTASSIUM CHLORIDE, CALCIUM CHLORIDE 600; 310; 30; 20 MG/100ML; MG/100ML; MG/100ML; MG/100ML
INJECTION, SOLUTION INTRAVENOUS CONTINUOUS
Status: CANCELLED | OUTPATIENT
Start: 2024-06-20

## 2024-06-20 RX ORDER — SODIUM CHLORIDE 0.9 % (FLUSH) 0.9 %
5-40 SYRINGE (ML) INJECTION EVERY 12 HOURS SCHEDULED
Status: CANCELLED | OUTPATIENT
Start: 2024-06-20

## 2024-06-20 RX ORDER — MIDAZOLAM HYDROCHLORIDE 1 MG/ML
INJECTION INTRAMUSCULAR; INTRAVENOUS PRN
Status: DISCONTINUED | OUTPATIENT
Start: 2024-06-20 | End: 2024-06-20 | Stop reason: SDUPTHER

## 2024-06-20 RX ORDER — IPRATROPIUM BROMIDE AND ALBUTEROL SULFATE 2.5; .5 MG/3ML; MG/3ML
1 SOLUTION RESPIRATORY (INHALATION) ONCE
Status: DISCONTINUED | OUTPATIENT
Start: 2024-06-20 | End: 2024-06-21 | Stop reason: HOSPADM

## 2024-06-20 RX ORDER — ALBUTEROL SULFATE 2.5 MG/3ML
2.5 SOLUTION RESPIRATORY (INHALATION)
Status: COMPLETED | OUTPATIENT
Start: 2024-06-20 | End: 2024-06-20

## 2024-06-20 RX ORDER — SCOLOPAMINE TRANSDERMAL SYSTEM 1 MG/1
1 PATCH, EXTENDED RELEASE TRANSDERMAL
Status: CANCELLED | OUTPATIENT
Start: 2024-06-20 | End: 2024-06-23

## 2024-06-20 RX ORDER — ENOXAPARIN SODIUM 100 MG/ML
40 INJECTION SUBCUTANEOUS ONCE
Status: CANCELLED | OUTPATIENT
Start: 2024-06-20 | End: 2024-06-20

## 2024-06-20 RX ORDER — SODIUM CHLORIDE 9 MG/ML
INJECTION, SOLUTION INTRAVENOUS PRN
Status: DISCONTINUED | OUTPATIENT
Start: 2024-06-20 | End: 2024-06-21 | Stop reason: HOSPADM

## 2024-06-20 RX ORDER — ACETAMINOPHEN 120 MG/1
SUPPOSITORY RECTAL PRN
Status: DISCONTINUED | OUTPATIENT
Start: 2024-06-20 | End: 2024-06-20 | Stop reason: ALTCHOICE

## 2024-06-20 RX ORDER — ROCURONIUM BROMIDE 10 MG/ML
INJECTION, SOLUTION INTRAVENOUS PRN
Status: DISCONTINUED | OUTPATIENT
Start: 2024-06-20 | End: 2024-06-20 | Stop reason: SDUPTHER

## 2024-06-20 RX ORDER — ENOXAPARIN SODIUM 100 MG/ML
40 INJECTION SUBCUTANEOUS EVERY 12 HOURS SCHEDULED
Status: DISCONTINUED | OUTPATIENT
Start: 2024-06-20 | End: 2024-06-21 | Stop reason: HOSPADM

## 2024-06-20 RX ORDER — GLYCOPYRROLATE 0.2 MG/ML
INJECTION INTRAMUSCULAR; INTRAVENOUS PRN
Status: DISCONTINUED | OUTPATIENT
Start: 2024-06-20 | End: 2024-06-20 | Stop reason: SDUPTHER

## 2024-06-20 RX ORDER — LIDOCAINE HYDROCHLORIDE 20 MG/ML
INJECTION, SOLUTION EPIDURAL; INFILTRATION; INTRACAUDAL; PERINEURAL PRN
Status: DISCONTINUED | OUTPATIENT
Start: 2024-06-20 | End: 2024-06-20 | Stop reason: SDUPTHER

## 2024-06-20 RX ORDER — PROPOFOL 10 MG/ML
INJECTION, EMULSION INTRAVENOUS PRN
Status: DISCONTINUED | OUTPATIENT
Start: 2024-06-20 | End: 2024-06-20 | Stop reason: SDUPTHER

## 2024-06-20 RX ORDER — DIPHENHYDRAMINE HCL 25 MG
25 CAPSULE ORAL EVERY 6 HOURS PRN
Status: DISCONTINUED | OUTPATIENT
Start: 2024-06-20 | End: 2024-06-21 | Stop reason: HOSPADM

## 2024-06-20 RX ORDER — FENTANYL CITRATE 50 UG/ML
25 INJECTION, SOLUTION INTRAMUSCULAR; INTRAVENOUS EVERY 5 MIN PRN
Status: DISCONTINUED | OUTPATIENT
Start: 2024-06-20 | End: 2024-06-20 | Stop reason: HOSPADM

## 2024-06-20 RX ORDER — FAMOTIDINE 10 MG/ML
20 INJECTION, SOLUTION INTRAVENOUS ONCE
Status: CANCELLED | OUTPATIENT
Start: 2024-06-20 | End: 2024-06-20

## 2024-06-20 RX ORDER — PROCHLORPERAZINE EDISYLATE 5 MG/ML
10 INJECTION INTRAMUSCULAR; INTRAVENOUS EVERY 6 HOURS PRN
Status: DISCONTINUED | OUTPATIENT
Start: 2024-06-20 | End: 2024-06-21 | Stop reason: HOSPADM

## 2024-06-20 RX ORDER — BUPIVACAINE HYDROCHLORIDE 2.5 MG/ML
INJECTION, SOLUTION EPIDURAL; INFILTRATION; INTRACAUDAL PRN
Status: DISCONTINUED | OUTPATIENT
Start: 2024-06-20 | End: 2024-06-20 | Stop reason: ALTCHOICE

## 2024-06-20 RX ORDER — ALBUTEROL SULFATE 2.5 MG/3ML
SOLUTION RESPIRATORY (INHALATION)
Status: COMPLETED
Start: 2024-06-20 | End: 2024-06-20

## 2024-06-20 RX ORDER — SODIUM CHLORIDE 0.9 % (FLUSH) 0.9 %
5-40 SYRINGE (ML) INJECTION PRN
Status: DISCONTINUED | OUTPATIENT
Start: 2024-06-20 | End: 2024-06-21 | Stop reason: HOSPADM

## 2024-06-20 RX ORDER — SODIUM CHLORIDE 9 MG/ML
INJECTION, SOLUTION INTRAVENOUS PRN
Status: DISCONTINUED | OUTPATIENT
Start: 2024-06-20 | End: 2024-06-20 | Stop reason: HOSPADM

## 2024-06-20 RX ORDER — SODIUM CHLORIDE 0.9 % (FLUSH) 0.9 %
5-40 SYRINGE (ML) INJECTION PRN
Status: DISCONTINUED | OUTPATIENT
Start: 2024-06-20 | End: 2024-06-20 | Stop reason: HOSPADM

## 2024-06-20 RX ORDER — SODIUM CHLORIDE 0.9 % (FLUSH) 0.9 %
SYRINGE (ML) INJECTION PRN
Status: DISCONTINUED | OUTPATIENT
Start: 2024-06-20 | End: 2024-06-20 | Stop reason: ALTCHOICE

## 2024-06-20 RX ORDER — ONDANSETRON 2 MG/ML
4 INJECTION INTRAMUSCULAR; INTRAVENOUS EVERY 6 HOURS
Status: DISCONTINUED | OUTPATIENT
Start: 2024-06-20 | End: 2024-06-21 | Stop reason: HOSPADM

## 2024-06-20 RX ORDER — SODIUM CHLORIDE, SODIUM LACTATE, POTASSIUM CHLORIDE, CALCIUM CHLORIDE 600; 310; 30; 20 MG/100ML; MG/100ML; MG/100ML; MG/100ML
INJECTION, SOLUTION INTRAVENOUS CONTINUOUS
Status: DISCONTINUED | OUTPATIENT
Start: 2024-06-20 | End: 2024-06-21 | Stop reason: HOSPADM

## 2024-06-20 RX ORDER — LIDOCAINE HYDROCHLORIDE 10 MG/ML
1 INJECTION, SOLUTION EPIDURAL; INFILTRATION; INTRACAUDAL; PERINEURAL
Status: DISCONTINUED | OUTPATIENT
Start: 2024-06-20 | End: 2024-06-20 | Stop reason: HOSPADM

## 2024-06-20 RX ORDER — SODIUM CHLORIDE 9 MG/ML
INJECTION, SOLUTION INTRAVENOUS PRN
Status: CANCELLED | OUTPATIENT
Start: 2024-06-20

## 2024-06-20 RX ORDER — FENTANYL CITRATE 50 UG/ML
INJECTION, SOLUTION INTRAMUSCULAR; INTRAVENOUS PRN
Status: DISCONTINUED | OUTPATIENT
Start: 2024-06-20 | End: 2024-06-20 | Stop reason: SDUPTHER

## 2024-06-20 RX ORDER — NALOXONE HYDROCHLORIDE 0.4 MG/ML
INJECTION, SOLUTION INTRAMUSCULAR; INTRAVENOUS; SUBCUTANEOUS PRN
Status: DISCONTINUED | OUTPATIENT
Start: 2024-06-20 | End: 2024-06-20 | Stop reason: HOSPADM

## 2024-06-20 RX ORDER — SCOLOPAMINE TRANSDERMAL SYSTEM 1 MG/1
1 PATCH, EXTENDED RELEASE TRANSDERMAL
Status: DISCONTINUED | OUTPATIENT
Start: 2024-06-20 | End: 2024-06-20

## 2024-06-20 RX ORDER — ALBUTEROL SULFATE 2.5 MG/3ML
2.5 SOLUTION RESPIRATORY (INHALATION)
Status: DISCONTINUED | OUTPATIENT
Start: 2024-06-20 | End: 2024-06-21

## 2024-06-20 RX ADMIN — FENTANYL CITRATE 25 MCG: 50 INJECTION INTRAMUSCULAR; INTRAVENOUS at 15:35

## 2024-06-20 RX ADMIN — HYOSCYAMINE SULFATE 125 MCG: 0.12 TABLET ORAL; SUBLINGUAL at 21:07

## 2024-06-20 RX ADMIN — ENOXAPARIN SODIUM 40 MG: 100 INJECTION SUBCUTANEOUS at 11:08

## 2024-06-20 RX ADMIN — ONDANSETRON 4 MG: 2 INJECTION INTRAMUSCULAR; INTRAVENOUS at 13:42

## 2024-06-20 RX ADMIN — Medication 100 MG: at 11:49

## 2024-06-20 RX ADMIN — FENTANYL CITRATE 100 MCG: 50 INJECTION INTRAMUSCULAR; INTRAVENOUS at 11:49

## 2024-06-20 RX ADMIN — ROCURONIUM BROMIDE 45 MG: 50 INJECTION INTRAVENOUS at 11:55

## 2024-06-20 RX ADMIN — GABAPENTIN 300 MG: 300 CAPSULE ORAL at 18:38

## 2024-06-20 RX ADMIN — SODIUM CHLORIDE, POTASSIUM CHLORIDE, SODIUM LACTATE AND CALCIUM CHLORIDE: 600; 310; 30; 20 INJECTION, SOLUTION INTRAVENOUS at 23:52

## 2024-06-20 RX ADMIN — FENTANYL CITRATE 50 MCG: 50 INJECTION INTRAMUSCULAR; INTRAVENOUS at 14:07

## 2024-06-20 RX ADMIN — GLYCOPYRROLATE 0.2 MG: 0.2 INJECTION, SOLUTION INTRAMUSCULAR; INTRAVENOUS at 11:40

## 2024-06-20 RX ADMIN — FENTANYL CITRATE 50 MCG: 50 INJECTION INTRAMUSCULAR; INTRAVENOUS at 13:55

## 2024-06-20 RX ADMIN — MIDAZOLAM 2 MG: 1 INJECTION, SOLUTION INTRAMUSCULAR; INTRAVENOUS at 11:42

## 2024-06-20 RX ADMIN — ALBUTEROL SULFATE 2.5 MG: 2.5 SOLUTION RESPIRATORY (INHALATION) at 20:38

## 2024-06-20 RX ADMIN — SODIUM CHLORIDE, POTASSIUM CHLORIDE, SODIUM LACTATE AND CALCIUM CHLORIDE: 600; 310; 30; 20 INJECTION, SOLUTION INTRAVENOUS at 18:32

## 2024-06-20 RX ADMIN — OXYCODONE HYDROCHLORIDE 5 MG: 5 TABLET ORAL at 23:48

## 2024-06-20 RX ADMIN — ROCURONIUM BROMIDE 5 MG: 50 INJECTION INTRAVENOUS at 11:49

## 2024-06-20 RX ADMIN — SODIUM CHLORIDE, SODIUM LACTATE, POTASSIUM CHLORIDE, AND CALCIUM CHLORIDE: 600; 310; 30; 20 INJECTION, SOLUTION INTRAVENOUS at 10:56

## 2024-06-20 RX ADMIN — SIMETHICONE 80 MG: 80 TABLET, CHEWABLE ORAL at 21:07

## 2024-06-20 RX ADMIN — ENOXAPARIN SODIUM 40 MG: 100 INJECTION SUBCUTANEOUS at 21:01

## 2024-06-20 RX ADMIN — LIDOCAINE HYDROCHLORIDE 100 MG: 20 INJECTION, SOLUTION EPIDURAL; INFILTRATION; INTRACAUDAL; PERINEURAL at 11:49

## 2024-06-20 RX ADMIN — ONDANSETRON 4 MG: 2 INJECTION INTRAMUSCULAR; INTRAVENOUS at 23:50

## 2024-06-20 RX ADMIN — ALBUTEROL SULFATE 2.5 MG: 2.5 SOLUTION RESPIRATORY (INHALATION) at 14:40

## 2024-06-20 RX ADMIN — ACETAMINOPHEN 325MG 650 MG: 325 TABLET ORAL at 23:48

## 2024-06-20 RX ADMIN — SODIUM CHLORIDE, PRESERVATIVE FREE 10 ML: 5 INJECTION INTRAVENOUS at 20:02

## 2024-06-20 RX ADMIN — FENTANYL CITRATE 25 MCG: 50 INJECTION INTRAMUSCULAR; INTRAVENOUS at 15:25

## 2024-06-20 RX ADMIN — Medication 25 MG: at 12:01

## 2024-06-20 RX ADMIN — SUGAMMADEX 200 MG: 100 INJECTION, SOLUTION INTRAVENOUS at 14:07

## 2024-06-20 RX ADMIN — DEXAMETHASONE SODIUM PHOSPHATE 4 MG: 4 INJECTION INTRA-ARTICULAR; INTRALESIONAL; INTRAMUSCULAR; INTRAVENOUS; SOFT TISSUE at 11:54

## 2024-06-20 RX ADMIN — FAMOTIDINE 20 MG: 10 INJECTION, SOLUTION INTRAVENOUS at 11:09

## 2024-06-20 RX ADMIN — ACETAMINOPHEN 325MG 650 MG: 325 TABLET ORAL at 18:40

## 2024-06-20 RX ADMIN — OXYCODONE HYDROCHLORIDE 5 MG: 5 TABLET ORAL at 19:53

## 2024-06-20 RX ADMIN — PROPOFOL 200 MG: 10 INJECTION, EMULSION INTRAVENOUS at 11:49

## 2024-06-20 RX ADMIN — SODIUM CHLORIDE, PRESERVATIVE FREE 40 MG: 5 INJECTION INTRAVENOUS at 18:40

## 2024-06-20 RX ADMIN — SODIUM CHLORIDE, POTASSIUM CHLORIDE, SODIUM LACTATE AND CALCIUM CHLORIDE: 600; 310; 30; 20 INJECTION, SOLUTION INTRAVENOUS at 20:01

## 2024-06-20 RX ADMIN — FOSAPREPITANT 150 MG: 150 INJECTION, POWDER, LYOPHILIZED, FOR SOLUTION INTRAVENOUS at 11:14

## 2024-06-20 RX ADMIN — WATER 2000 MG: 1 INJECTION, SOLUTION INTRAMUSCULAR; INTRAVENOUS; SUBCUTANEOUS at 12:05

## 2024-06-20 RX ADMIN — FENTANYL CITRATE 50 MCG: 50 INJECTION INTRAMUSCULAR; INTRAVENOUS at 13:35

## 2024-06-20 RX ADMIN — FENTANYL CITRATE 50 MCG: 50 INJECTION INTRAMUSCULAR; INTRAVENOUS at 14:18

## 2024-06-20 RX ADMIN — SODIUM CHLORIDE, SODIUM LACTATE, POTASSIUM CHLORIDE, AND CALCIUM CHLORIDE: 600; 310; 30; 20 INJECTION, SOLUTION INTRAVENOUS at 12:45

## 2024-06-20 ASSESSMENT — PAIN DESCRIPTION - DESCRIPTORS
DESCRIPTORS: ACHING
DESCRIPTORS: ACHING
DESCRIPTORS: ACHING;CRAMPING
DESCRIPTORS: ACHING
DESCRIPTORS: CRAMPING
DESCRIPTORS: ACHING
DESCRIPTORS: ACHING

## 2024-06-20 ASSESSMENT — PAIN - FUNCTIONAL ASSESSMENT
PAIN_FUNCTIONAL_ASSESSMENT: ACTIVITIES ARE NOT PREVENTED
PAIN_FUNCTIONAL_ASSESSMENT: 0-10
PAIN_FUNCTIONAL_ASSESSMENT: ACTIVITIES ARE NOT PREVENTED

## 2024-06-20 ASSESSMENT — PAIN DESCRIPTION - ORIENTATION
ORIENTATION: MID
ORIENTATION: ANTERIOR
ORIENTATION: MID
ORIENTATION: LOWER

## 2024-06-20 ASSESSMENT — PAIN DESCRIPTION - PAIN TYPE
TYPE: SURGICAL PAIN

## 2024-06-20 ASSESSMENT — PAIN DESCRIPTION - LOCATION
LOCATION: ABDOMEN
LOCATION: ABDOMEN
LOCATION: BACK
LOCATION: ABDOMEN;BACK
LOCATION: BACK;ABDOMEN
LOCATION: ABDOMEN;BACK
LOCATION: BACK

## 2024-06-20 ASSESSMENT — PAIN SCALES - GENERAL
PAINLEVEL_OUTOF10: 4
PAINLEVEL_OUTOF10: 0
PAINLEVEL_OUTOF10: 0
PAINLEVEL_OUTOF10: 6
PAINLEVEL_OUTOF10: 0
PAINLEVEL_OUTOF10: 6
PAINLEVEL_OUTOF10: 5
PAINLEVEL_OUTOF10: 5
PAINLEVEL_OUTOF10: 4
PAINLEVEL_OUTOF10: 0
PAINLEVEL_OUTOF10: 2
PAINLEVEL_OUTOF10: 6
PAINLEVEL_OUTOF10: 0
PAINLEVEL_OUTOF10: 0

## 2024-06-20 ASSESSMENT — PAIN DESCRIPTION - FREQUENCY
FREQUENCY: INTERMITTENT

## 2024-06-20 ASSESSMENT — ENCOUNTER SYMPTOMS: SHORTNESS OF BREATH: 1

## 2024-06-20 NOTE — PROGRESS NOTES
Pt arrives from PACU, Aox4, VSS, RR unlabored,  pain 4/10 on the lower back, 5 lap sites on the abdomen, with skin glue,clean, dry and intact, no bleeding noted, she ambulates to the bathroom and voided, opts to sit at the recliner.       BP (!) 142/86   Pulse 91   Temp 97.6 °F (36.4 °C) (Oral)   Resp 20   Ht 1.575 m (5' 2\")   Wt 104.3 kg (230 lb)   SpO2 95%   BMI 42.07 kg/m²

## 2024-06-20 NOTE — PERIOP NOTE
TRANSFER - OUT REPORT:    Telephone report given to Isadora on Rosa Houston  being transferred to [facility] for routine post-op       Report consisted of patient's Situation, Background, Assessment and   Recommendations(SBAR).     Information from the following report(s) Surgery Report and MAR was reviewed with the receiving nurse.           Lines:   Peripheral IV 06/20/24 Posterior;Right Hand (Active)   Site Assessment Clean, dry & intact 06/20/24 1432   Line Status Infusing 06/20/24 1432   Line Care Connections checked and tightened 06/20/24 1432   Phlebitis Assessment No symptoms 06/20/24 1432   Infiltration Assessment 0 06/20/24 1432   Dressing Status Clean, dry & intact 06/20/24 1432   Dressing Type Transparent 06/20/24 1432       Peripheral IV 06/20/24 Left;Posterior Hand (Active)   Site Assessment Clean, dry & intact 06/20/24 1432   Line Status Infusing 06/20/24 1432   Line Care Connections checked and tightened 06/20/24 1432   Phlebitis Assessment No symptoms 06/20/24 1432   Infiltration Assessment 0 06/20/24 1432   Dressing Status Clean, dry & intact 06/20/24 1432   Dressing Type Transparent 06/20/24 1432        Opportunity for questions and clarification was provided.      Patient transported with:  Tech

## 2024-06-20 NOTE — ANESTHESIA PRE PROCEDURE
and Nursing notes reviewed  Airway: Mallampati: II  TM distance: >3 FB   Neck ROM: full  Mouth opening: > = 3 FB   Dental: normal exam         Pulmonary:normal exam    (+)   shortness of breath:         asthma:                            Cardiovascular:    (+) hypertension:                  Neuro/Psych:   Negative Neuro/Psych ROS  (+) psychiatric history:depression/anxiety             GI/Hepatic/Renal:   (+) GERD:          Endo/Other: Negative Endo/Other ROS   (+) Diabetes.                 Abdominal: normal exam            Vascular: negative vascular ROS.         Other Findings:           Anesthesia Plan      general     ASA 3       Induction: intravenous.    MIPS: Postoperative opioids intended.  Anesthetic plan and risks discussed with patient.      Plan discussed with CRNA.                  Sulaiman Omalley MD   6/20/2024

## 2024-06-20 NOTE — OP NOTE
Operative Report    Patient: Rosa Houston MRN: [id number]  SSN: [id number]    YOB: 1990  Age: 33 y.o.  Sex: female       Date of Surgery: 06/20/24     Preoperative Diagnosis:      * Morbid obesity (HCC) [E66.01]     * Type 2 diabetes mellitus without complication, unspecified whether long term insulin use (HCC) [E11.9]     * Asthma, unspecified asthma severity, unspecified whether complicated, unspecified whether persistent [J45.909]     * Hiatal hernia [K44.9]     * Gastroesophageal reflux disease, unspecified whether esophagitis present [K21.9]      Postoperative Diagnosis:     * Morbid obesity (HCC) [E66.01]     * Type 2 diabetes mellitus without complication, unspecified whether long term insulin use (HCC) [E11.9]     * Asthma, unspecified asthma severity, unspecified whether complicated, unspecified whether persistent [J45.909]     * Hiatal hernia [K44.9]     * Gastroesophageal reflux disease, unspecified whether esophagitis present [K21.9]    Hepatomegaly  NAFLD    Surgeon(s) and Role:     * Merlin Mora MD - Primary    Assistant: Emilie Waggoner NP (No qualified resident was available for assistance; assistant was involved in port placement, camera operation, manipulation and retraction of tissue, removing the excised specimen, and skin closure)    Anesthesia: General     Procedure:   Laparoscopic hiatal hernia repair with robotic assist  Laparoscopic sleeve gastrectomy with robotic assist  Laparoscopic wedge biopsy of the left lobe of the liver  Laparoscopic adhesiolysis (omentum from anterior abdominal wall)    Findings:    Infection Present At Time Of Surgery (PATOS) (choose all levels that have infection present):  No infection present  Other Findings: Preoperatively diagnosed hiatal hernia confirmed and repaired, hernia sac resected. Uneventful r-LSG. Adhesiolysis performed at onset of case due to prior open pelvic procedures resulting in omental adhesions to anterior abdominal  stitches until the crura gently abutted the esophagus circumferentially with just enough room to accommodate an instrument.     We removed the specimen.     We placed a 2-0 vicryl suture at the 12 mm trocar sites. There was some bleeding from the left midclavicular transfascial stitch, so another was placed and hemostasis was noted. We removed the liver retractor.     We removed the trocars under direct visualization. The dermis was closed with 4-0 Monocryl followed by Dermabond for the skin.     At the end of the procedure all instrument, needle, and sponge counts were correct. I was present and scrubbed throughout the entirety of the case. The patient awoke from anesthesia and was extubated without complication and sent to PACU in stable condition.      Estimated Blood Loss:  minimal    Implants: * No implants in log *            Specimens:   ID Type Source Tests Collected by Time Destination   A : Portion of stomach Tissue Stomach SURGICAL PATHOLOGY Merlin Mora MD 6/20/2024 1406    B : Liver wedge Biopsy Tissue Liver SURGICAL PATHOLOGY Merlin Mora MD 6/20/2024 1344    C : hernia sac Tissue Hernia Sac SURGICAL PATHOLOGY Merlin Mora MD 6/20/2024 1343            Drains: None                Complications: None    Counts: Sponge and needle counts were correct times two.    Signed By:  Merlin Mora MD     June 20, 2024

## 2024-06-20 NOTE — ANESTHESIA POSTPROCEDURE EVALUATION
Department of Anesthesiology  Postprocedure Note    Patient: Rosa Houston  MRN: [id number]  YOB: 1990  Date of evaluation: 6/20/2024    Procedure Summary       Date: 06/20/24 Room / Location: [facility] MAIN 04 / [facility] MAIN OR    Anesthesia Start: 1140 Anesthesia Stop: 1441    Procedure: Robotic assisted laparoscopic sleeve gastrectomy, liver wedge biopsy and hiatal hernia repair. (Abdomen) Diagnosis:       Morbid obesity (HCC)      Encounter for pre-bariatric surgery counseling and education      Type 2 diabetes mellitus without complication, unspecified whether long term insulin use (HCC)      Asthma, unspecified asthma severity, unspecified whether complicated, unspecified whether persistent      Hiatal hernia      Gastroesophageal reflux disease, unspecified whether esophagitis present      (Morbid obesity (HCC) [E66.01])      (Encounter for pre-bariatric surgery counseling and education [Z71.89])      (Type 2 diabetes mellitus without complication, unspecified whether long term insulin use (HCC) [E11.9])      (Asthma, unspecified asthma severity, unspecified whether complicated, unspecified whether persistent [J45.909])      (Hiatal hernia [K44.9])      (Gastroesophageal reflux disease, unspecified whether esophagitis present [K21.9])    Surgeons: Merlin Mora MD Responsible Provider: Sulaiman Omalley MD    Anesthesia Type: general ASA Status: 3            Anesthesia Type: No value filed.    Emma Phase I: Emma Score: 10    Emma Phase II:      Anesthesia Post Evaluation    Patient location during evaluation: PACU  Patient participation: complete - patient participated  Level of consciousness: awake  Airway patency: patent  Nausea & Vomiting: no nausea  Cardiovascular status: blood pressure returned to baseline and hemodynamically stable  Respiratory status: acceptable  Hydration status: euvolemic  Pain management: adequate    No notable events documented.

## 2024-06-20 NOTE — H&P
Patient presents for a preop appointment for bariatric surgery having completed the insurance-mandated and clinically-relevant clearances/counseling.      REVIEW:     Lab review:  A1c 6.3%  CBC normal  B1 normal  B12 normal  Folate normal  Vitamin D 16.6, on replacement therapy  Iron normal  TSH normal  CMP normal  H pylori negative on path report from EGD     Nutrition: Finished all required nutrition sessions and cleared by dietitian     Psych: Completed mandatory pre-bariatric surgery psychological evaluation and cleared by psychology service     Attended support group     PCP: Clearance letter/note in chart     Tested negative for BENSON on sleep study     EGD / UGI reviewed / issues:      UGI   IMPRESSION:  Gastroesophageal reflux.  Small sliding hiatal hernia.     EGD  FINDINGS:   1. Irregular Z line  2. Hiatal hernia, Hill grade 3 hiatal anatomy on retroflexed view of hiatus  3. No bile reflux or polyps identified  4. Mild linear antral erythema/gastritis, biopsied  5. Normal appearing duodenal bulb/D1     Denies nausea, vomiting, dysphagia  Reports reflux, triggered by red sauces, citrus, spicy foods, and without those exposures, no symptoms. Takes protonix routinely and has no symptoms even with eating these foods.      Previous relevant surgeries:   History of bilateral ovarian germ cell tumors, sp lap left oophorectomy and later right open oophorectomy via low midline laparotomy.           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     General: No acute distress, conversant  Eyes: PERRLA, no scleral icterus  HENT: Normocephalic without oral lesions  Neck: Trachea midline without LAD  Cardiac: Normal pulse rate and rhythm  Pulmonary: Symmetric chest rise with normal effort  GI: Soft, NT, ND, no hernia, no splenomegaly  Skin: Warm without rash  Extremities: No edema or joint stiffness  Psych: Appropriate mood and affect     Assessment:      Morbid obesity with comorbidities       Encounter Diagnoses   Name Primary?    Obesity, morbid (HCC) Yes    Encounter for pre-bariatric surgery counseling and education      Type 2 diabetes mellitus without complication, without long-term current use of insulin (HCC)      Asthma, unspecified asthma severity, unspecified whether complicated, unspecified whether persistent      Hiatal hernia      Gastro-esophageal reflux disease without esophagitis        Plan:   The patient and I had further discussion after their successful supervised weight loss, medical, dietary, and psychiatric clearance. Preoperative upper GI/EGD reviewed. The patient elects to proceed with sleeve gastrectomy with hiatal hernia repair with possible mesh placement.   We have reviewed the bariatric risk calculator and they understand the risks of surgery for their individual risk factors.

## 2024-06-21 VITALS
HEART RATE: 58 BPM | HEIGHT: 62 IN | SYSTOLIC BLOOD PRESSURE: 128 MMHG | OXYGEN SATURATION: 100 % | BODY MASS INDEX: 42.33 KG/M2 | WEIGHT: 230 LBS | RESPIRATION RATE: 18 BRPM | TEMPERATURE: 97.8 F | DIASTOLIC BLOOD PRESSURE: 76 MMHG

## 2024-06-21 LAB
BASOPHILS # BLD: 0 K/UL (ref 0–0.1)
BASOPHILS NFR BLD: 0 % (ref 0–2)
DIFFERENTIAL METHOD BLD: ABNORMAL
EOSINOPHIL # BLD: 0 K/UL (ref 0–0.4)
EOSINOPHIL NFR BLD: 0 % (ref 0–5)
ERYTHROCYTE [DISTWIDTH] IN BLOOD BY AUTOMATED COUNT: 12.7 % (ref 11.6–14.5)
HCT VFR BLD AUTO: 37.7 % (ref 35–45)
HGB BLD-MCNC: 13.2 G/DL (ref 12–16)
IMM GRANULOCYTES # BLD AUTO: 0 K/UL (ref 0–0.04)
IMM GRANULOCYTES NFR BLD AUTO: 0 % (ref 0–0.5)
LYMPHOCYTES # BLD: 0.6 K/UL (ref 0.9–3.6)
LYMPHOCYTES NFR BLD: 5 % (ref 21–52)
MCH RBC QN AUTO: 28.8 PG (ref 24–34)
MCHC RBC AUTO-ENTMCNC: 35 G/DL (ref 31–37)
MCV RBC AUTO: 82.3 FL (ref 78–100)
MONOCYTES # BLD: 0.6 K/UL (ref 0.05–1.2)
MONOCYTES NFR BLD: 5 % (ref 3–10)
NEUTS SEG # BLD: 10.7 K/UL (ref 1.8–8)
NEUTS SEG NFR BLD: 89 % (ref 40–73)
NRBC # BLD: 0 K/UL (ref 0–0.01)
NRBC BLD-RTO: 0 PER 100 WBC
PLATELET # BLD AUTO: 295 K/UL (ref 135–420)
PMV BLD AUTO: 11.4 FL (ref 9.2–11.8)
RBC # BLD AUTO: 4.58 M/UL (ref 4.2–5.3)
WBC # BLD AUTO: 12 K/UL (ref 4.6–13.2)

## 2024-06-21 PROCEDURE — 6360000002 HC RX W HCPCS: Performed by: SURGERY

## 2024-06-21 PROCEDURE — C9113 INJ PANTOPRAZOLE SODIUM, VIA: HCPCS | Performed by: SURGERY

## 2024-06-21 PROCEDURE — 2580000003 HC RX 258: Performed by: SURGERY

## 2024-06-21 PROCEDURE — 36415 COLL VENOUS BLD VENIPUNCTURE: CPT

## 2024-06-21 PROCEDURE — 85025 COMPLETE CBC W/AUTO DIFF WBC: CPT

## 2024-06-21 PROCEDURE — 94761 N-INVAS EAR/PLS OXIMETRY MLT: CPT

## 2024-06-21 PROCEDURE — 94640 AIRWAY INHALATION TREATMENT: CPT

## 2024-06-21 PROCEDURE — 99024 POSTOP FOLLOW-UP VISIT: CPT | Performed by: REGISTERED NURSE

## 2024-06-21 PROCEDURE — 6370000000 HC RX 637 (ALT 250 FOR IP): Performed by: SURGERY

## 2024-06-21 RX ORDER — ONDANSETRON 4 MG/1
4 TABLET, ORALLY DISINTEGRATING ORAL EVERY 8 HOURS PRN
Qty: 15 TABLET | Refills: 0 | Status: SHIPPED | OUTPATIENT
Start: 2024-06-21

## 2024-06-21 RX ORDER — NORETHINDRONE ACETATE AND ETHINYL ESTRADIOL AND FERROUS FUMARATE 1.5-30(21)
1 KIT ORAL DAILY
Qty: 1 PACKET | Refills: 3
Start: 2024-06-21 | End: 2024-06-25

## 2024-06-21 RX ORDER — OXYCODONE HYDROCHLORIDE 5 MG/1
5 TABLET ORAL EVERY 6 HOURS PRN
Qty: 10 TABLET | Refills: 0 | Status: SHIPPED | OUTPATIENT
Start: 2024-06-21 | End: 2024-06-24

## 2024-06-21 RX ORDER — ACETAMINOPHEN 325 MG/1
325 TABLET ORAL EVERY 6 HOURS PRN
Qty: 56 TABLET | Refills: 0 | Status: SHIPPED | OUTPATIENT
Start: 2024-06-21

## 2024-06-21 RX ORDER — ALBUTEROL SULFATE 2.5 MG/3ML
2.5 SOLUTION RESPIRATORY (INHALATION)
Status: DISCONTINUED | OUTPATIENT
Start: 2024-06-21 | End: 2024-06-21 | Stop reason: HOSPADM

## 2024-06-21 RX ORDER — SIMETHICONE 80 MG
80 TABLET,CHEWABLE ORAL EVERY 6 HOURS PRN
Qty: 12 TABLET | Refills: 0 | Status: SHIPPED | OUTPATIENT
Start: 2024-06-21

## 2024-06-21 RX ORDER — OMEPRAZOLE 20 MG/1
20 CAPSULE, DELAYED RELEASE ORAL
Qty: 30 CAPSULE | Refills: 5 | Status: SHIPPED | OUTPATIENT
Start: 2024-06-21 | End: 2024-06-25

## 2024-06-21 RX ADMIN — ACETAMINOPHEN 325MG 650 MG: 325 TABLET ORAL at 12:12

## 2024-06-21 RX ADMIN — OXYCODONE HYDROCHLORIDE 5 MG: 5 TABLET ORAL at 03:51

## 2024-06-21 RX ADMIN — SODIUM CHLORIDE, PRESERVATIVE FREE 40 MG: 5 INJECTION INTRAVENOUS at 08:33

## 2024-06-21 RX ADMIN — ONDANSETRON 4 MG: 2 INJECTION INTRAMUSCULAR; INTRAVENOUS at 05:52

## 2024-06-21 RX ADMIN — OXYCODONE HYDROCHLORIDE 5 MG: 5 TABLET ORAL at 08:32

## 2024-06-21 RX ADMIN — OXYCODONE HYDROCHLORIDE 5 MG: 5 TABLET ORAL at 12:12

## 2024-06-21 RX ADMIN — ALBUTEROL SULFATE 2.5 MG: 2.5 SOLUTION RESPIRATORY (INHALATION) at 08:07

## 2024-06-21 RX ADMIN — GABAPENTIN 300 MG: 300 CAPSULE ORAL at 08:32

## 2024-06-21 RX ADMIN — ACETAMINOPHEN 325MG 650 MG: 325 TABLET ORAL at 05:51

## 2024-06-21 RX ADMIN — ONDANSETRON 4 MG: 2 INJECTION INTRAMUSCULAR; INTRAVENOUS at 12:12

## 2024-06-21 RX ADMIN — ENOXAPARIN SODIUM 40 MG: 100 INJECTION SUBCUTANEOUS at 08:32

## 2024-06-21 ASSESSMENT — PAIN DESCRIPTION - DESCRIPTORS
DESCRIPTORS: ACHING

## 2024-06-21 ASSESSMENT — PAIN DESCRIPTION - FREQUENCY
FREQUENCY: INTERMITTENT

## 2024-06-21 ASSESSMENT — PAIN DESCRIPTION - LOCATION
LOCATION: ABDOMEN

## 2024-06-21 ASSESSMENT — PAIN DESCRIPTION - ORIENTATION
ORIENTATION: MID
ORIENTATION: MID

## 2024-06-21 ASSESSMENT — PAIN DESCRIPTION - ONSET
ONSET: OTHER (COMMENT)
ONSET: OTHER (COMMENT)

## 2024-06-21 ASSESSMENT — PAIN - FUNCTIONAL ASSESSMENT
PAIN_FUNCTIONAL_ASSESSMENT: ACTIVITIES ARE NOT PREVENTED

## 2024-06-21 ASSESSMENT — PAIN SCALES - GENERAL
PAINLEVEL_OUTOF10: 0
PAINLEVEL_OUTOF10: 4
PAINLEVEL_OUTOF10: 5
PAINLEVEL_OUTOF10: 6
PAINLEVEL_OUTOF10: 2

## 2024-06-21 ASSESSMENT — PAIN DESCRIPTION - PAIN TYPE
TYPE: SURGICAL PAIN

## 2024-06-21 NOTE — PROGRESS NOTES
Surgery Progress Note    6/21/2024    Admit Date: 6/20/2024    Subjective:     Ms. Houston has no complaints and pain is controlled with current plan. She has been ambulating in halls. Denies dizziness, chest pain, shortness of breath. Bowel Movements: Some burping and flatus. Tolerating initial drinking trial without nausea and vomiting. Allergies verified. Discussed hormone replacement and XR medications.     Objective:     Blood pressure 113/74, pulse 59, temperature 97.4 °F (36.3 °C), temperature source Oral, resp. rate 20, height 1.575 m (5' 2\"), weight 104.3 kg (230 lb), SpO2 95 %.    No intake/output data recorded.    06/19 1901 - 06/21 0700  In: 2395.4 [P.O.:510; I.V.:1885.4]  Out: 1025 [Urine:1025]    EXAM: GENERAL: alert, oriented x4, no distress   HEART: regular rate and rhythm   LUNGS: clear to auscultation   ABDOMEN:  Soft, obese, appropriate incisional tenderness, +BS, non-distended, surgical incisions clean, dry, no erythema or drainage   EXTREMITIES: warm, well perfused    Data Review    Recent Results (from the past 24 hour(s))   TYPE AND SCREEN    Collection Time: 06/20/24 10:41 AM   Result Value Ref Range    Crossmatch expiration date 06/23/2024,2359     ABO/Rh A POSITIVE     Antibody Screen NEG    Pregnancy, Urine    Collection Time: 06/20/24 10:51 AM   Result Value Ref Range    Pregnancy, Urine Negative NEG     Urine Drug Screen    Collection Time: 06/20/24 10:51 AM   Result Value Ref Range    Benzodiazepines, Urine Negative NEG      Barbiturates, Urine Negative NEG      THC, TH-Cannabinol, Urine Positive (A) NEG      Opiates, Urine Negative NEG      Phencyclidine, Urine Negative NEG      Cocaine, Urine Negative NEG      Amphetamine, Urine Negative NEG      Methadone, Urine Negative NEG      Comments: (NOTE)    POCT Glucose    Collection Time: 06/20/24 10:59 AM   Result Value Ref Range    POC Glucose 94 70 - 110 mg/dL   POC Pregnancy Urine Qual    Collection Time: 06/20/24 11:16 AM   Result Value  Ref Range    Preg Test, Ur Negative NEG     POCT Glucose    Collection Time: 06/20/24  3:48 PM   Result Value Ref Range    POC Glucose 149 (H) 70 - 110 mg/dL   CBC with Auto Differential    Collection Time: 06/21/24  5:04 AM   Result Value Ref Range    WBC 12.0 4.6 - 13.2 K/uL    RBC 4.58 4.20 - 5.30 M/uL    Hemoglobin 13.2 12.0 - 16.0 g/dL    Hematocrit 37.7 35.0 - 45.0 %    MCV 82.3 78.0 - 100.0 FL    MCH 28.8 24.0 - 34.0 PG    MCHC 35.0 31.0 - 37.0 g/dL    RDW 12.7 11.6 - 14.5 %    Platelets 295 135 - 420 K/uL    MPV 11.4 9.2 - 11.8 FL    Nucleated RBCs 0.0 0  WBC    nRBC 0.00 0.00 - 0.01 K/uL    Neutrophils % 89 (H) 40 - 73 %    Lymphocytes % 5 (L) 21 - 52 %    Monocytes % 5 3 - 10 %    Eosinophils % 0 0 - 5 %    Basophils % 0 0 - 2 %    Immature Granulocytes % 0 0.0 - 0.5 %    Neutrophils Absolute 10.7 (H) 1.8 - 8.0 K/UL    Lymphocytes Absolute 0.6 (L) 0.9 - 3.6 K/UL    Monocytes Absolute 0.6 0.05 - 1.2 K/UL    Eosinophils Absolute 0.0 0.0 - 0.4 K/UL    Basophils Absolute 0.0 0.0 - 0.1 K/UL    Immature Granulocytes Absolute 0.0 0.00 - 0.04 K/UL    Differential Type AUTOMATED         Assessment:   Rosa Houston is a 33 y.o. female,  day 1 status post   Laparoscopic hiatal hernia repair with robotic assist  Laparoscopic sleeve gastrectomy with robotic assist  Laparoscopic wedge biopsy of the left lobe of the liver  Laparoscopic adhesiolysis (omentum from anterior abdominal wall)   Condition: Good    Plan:   -Ambulate every hour  -Encourage use of incentive spirometer, deep breathe/cough   -Pain managed prior to d/c   -Nausea managed prior to d/c   -Advance to Clear liquid Bariatric Surgery Diet, if able to tolerate clear liquid diet (with pro shake) 4oz per hour for 3 hours prior to d/c    If patient continues to progress, may d/c home later today.     ANDRES Porras NP  7:33 AM  6/21/2024

## 2024-06-21 NOTE — FLOWSHEET NOTE
4 Eyes Skin Assessment     NAME:  Rosa Houston  YOB: 1990  MEDICAL RECORD NUMBER:  [id number]    The patient is being assessed for  Shift Handoff    I agree that at least one RN has performed a thorough Head to Toe Skin Assessment on the patient. ALL assessment sites listed below have been assessed.      Areas assessed by both nurses:    Head, Face, Ears, Shoulders, Back, Chest, Arms, Elbows, Hands, Sacrum. Buttock, Coccyx, Ischium, and Legs. Feet and Heels        Does the Patient have a Wound? No noted wound(s)       Arturo Prevention initiated by RN: No  Wound Care Orders initiated by RN: No    Pressure Injury (Stage 3,4, Unstageable, DTI, NWPT, and Complex wounds) if present, place Wound referral order by RN under : No    New Ostomies, if present place, Ostomy referral order under : No     Nurse 1 eSignature: Electronically signed by RADHA PARADA RN on 6/21/24 at 8:14 AM EDT    **SHARE this note so that the co-signing nurse can place an eSignature**    Nurse 2 eSignature: Electronically signed by RADHA MONGE RN on 6/21/24 at 4:05 PM EDT

## 2024-06-21 NOTE — PROGRESS NOTES
Patient was educated on all discharge instructions below. He/she understood and was provided a copy. He/she knows who to call for any issues post discharge.   Hydration  Hydration is your NUMBER ONE priority.  Dehydration is the most common reason for readmission to the hospital. Dehydration occurs when  your body does not get enough fluid to keep it functioning at its best. Your body also requires fluid  to burn its stored fat calories for energy.  Carry a bottle of water with you all day, even when you are away from home; remind yourself to  drink even if you don’t feel thirsty. Drinking 64 ounces of fluid is your daily goal. You can tell if  you’re getting enough fluid if you’re making clear, light-colored urine five to 10 times per day.  Signs of dehydration can be thirst, headache, hard stools or dizziness upon sitting or standing up.  You should contact your surgeon’s office if you are unable to drink enough fluid to stay hydrated.  --     General Care after Surgery   No lifting over 15 pounds for four weeks.   No driving while taking the pain medication (about seven to 10 days).   No tub baths, swimming or hot tubs until incisions are healed (about two weeks).   You may shower. Clean incisions daily /gently with soap and check incisions for signs of infection:  -- Redness around incision.  -- Swelling at site.  -- Drainage with an foul odor (pus).  -- Increase tenderness around incision.   Take your temperature and resting pulse in the morning and evening. Record on tracking form  given to you. Call if your temperature is greater than 101 or your pulse rate is greater than 115.   Please contact your surgeon if you are having excessive abdominal pain (that lasts longer than  four hours and does not improve with prescribed pain medication), vomiting or shortness of breath.   Get up and move -- do not sit in one place for more than an hour.   You need to WALK (EXERCISE) for 30  meal.   Eat very slowly and chew all foods completely.   Keep portions small.   No simple sugars or high fat foods. No carbonated beverages. No caffeine.   Eat three meals per day. No skipping. Avoid snacking between meals.   No alcohol. No smoking.   Two Flintstones Complete Chewable vitamins each day. Take one in the morning and one at night.   1,500 milligrams Calcium Citrate per day in separate dosages.   Vitamin D 3: 5,000 IU taken per day.   Vitamin B-12: Take 1,000 micrograms sublingually daily.   Iron: 60 milligrams per day from [employer].   Protein supplements of your choice. Must be low sugar (0 to 3 grams), low fat (0 to 3 grams) and  provide at least 35 to 40 grams of protein each day. You need 60 to 70 grams of protein (food  and supplements) each day.   Minimum of 30 minutes of physical activity daily.  Do not take NSAIDS . Do not take Steroids without your surgeons permission.    Your Priorities After Surgery  ? Fluid: 64 ounces of fluid per day.  ? Protein: 60 to 70 grams of protein per day.  ? Walk every day.  Clear Liquid Diet  One week of clear liquids: minimum of 64 ounces of fluid per day.  Fluid:   Zero calorie liquids.   No caffeine.   No carbonation.   No sugary drinks.   No alcohol.   No straws.  Food   Protein drinks.   Less than 3 grams of sugar and  3 grams of fat per serving.   Protein drink should provide you with  60 to 70 grams of protein.  Soft Protein Diet  Five weeks of soft protein (1 ounce for soft protein, 3 ounces of yogurt/cottage cheese).  Three meals per day and 1 protein shake. Protein shakes should provide you with 30 grams of  protein on the soft protein diet.  Slow transition:   First week on soft protein diet -- focus on yogurt, cottage cheese, eggs, vegetarian refried beans,  black beans, kidney beans and white beans. (NO BAKED BEANS.)   Second through fourth week on soft protein diet -- focus on yogurt, cottage cheese, eggs,  canned tuna, canned chicken,

## 2024-06-21 NOTE — CARE COORDINATION
06/21/24 1101   Discharge Planning   Living Arrangements Spouse/Significant Other   Support Systems Spouse/Significant Other   Current DME Prior to Arrival Other (Comment)  (No DME needs prior to admission)   Potential Assistance Needed Other (Comment)  (Pending PT/OT recs)   Potential Assistance Purchasing Medications No   Potential DME Needed Other (Comment)  (Pending PT/OT recs)   Type of Home Care Services None   Patient expects to be discharged to: House       SW met with pt at bedside, introduced self and explained role. Pt presented as A&Ox4 and agreeable to assessment. Pt confirmed all the demographics listed on the face sheet. Per the pt, she resides with her  Manjit Houston [phone] in a 1 level hoe with 12-15 steps to its entrance. Pt denied any elevator access and reported climbing the steps with no difficulty prior to admission. Per the pt, she was independent with ADLs prior to admission and did not use any assistive I've devices to ambulate. Pt stated she has no ownership of DME.     Pt stated she has no HH Hx. Per the pt in the event HH is recommended at discharge she will be amenable to services. Pt stated she does not anticipate and DC needs. SW offered list and informed pt of her right to choose a provider however, pt stated she has no preference. SW sent referrals to a few HH agencies in [facility]. In the event HH is recommended pt will need an order.    Pt stated her  will transport her home at discharge.   GWEN/DONA to remain available.     Umang Boyle, ELENA  Case Management Department

## 2024-06-21 NOTE — DISCHARGE SUMMARY
Bariatric Surgery Discharge Progress Note    Admission Date: 6/20/2024    Discharge Date: 6/21/2024    Preoperative Diagnosis:      * Morbid obesity (HCC) [E66.01]     * Type 2 diabetes mellitus without complication, unspecified whether long term insulin use (HCC) [E11.9]     * Asthma, unspecified asthma severity, unspecified whether complicated, unspecified whether persistent [J45.909]     * Hiatal hernia [K44.9]     * Gastroesophageal reflux disease, unspecified whether esophagitis present [K21.9]       Postoperative Diagnosis:     * Morbid obesity (HCC) [E66.01]     * Type 2 diabetes mellitus without complication, unspecified whether long term insulin use (HCC) [E11.9]     * Asthma, unspecified asthma severity, unspecified whether complicated, unspecified whether persistent [J45.909]     * Hiatal hernia [K44.9]     * Gastroesophageal reflux disease, unspecified whether esophagitis present [K21.9]    Hepatomegaly  NAFLD    Procedure:   Laparoscopic hiatal hernia repair with robotic assist  Laparoscopic sleeve gastrectomy with robotic assist  Laparoscopic wedge biopsy of the left lobe of the liver  Laparoscopic adhesiolysis (omentum from anterior abdominal wall)    Postop Complications: none    Hospital Course:  Patient was admitted on 6/20/2024 for scheduled bariatric surgery.  Operation was without significant complication.  Patient admitted to the floor postoperatively, monitored as per protocol.  Diet sequentially advanced beginning POD 1, pain medications transitioned to oral during the hospital course. Currently the patient is afebrile, vital signs stable, tolerating a clear liquid diet with protein supplementation, voiding spontaneously, ambulatory with adequate pain control with oral medications and clear surgical sites without evidence of infection.    Discharge Diet:  Clear Liquid Bariatric Diet for 7 days, then soft moist protein diet for 5 weeks    Discharge Medications:     Medication List        START  Your Medications        These medications were sent to [facility] - [place], [place] - [address] [facility] - P [phone] - F [phone]  [address] [place] [id number]      Phone: [phone]   acetaminophen 325 MG tablet  hyoscyamine 125 MCG sublingual tablet  omeprazole 20 MG delayed release capsule  ondansetron 4 MG disintegrating tablet  oxyCODONE 5 MG immediate release tablet  simethicone 80 MG chewable tablet       Information about where to get these medications is not yet available    Ask your nurse or doctor about these medications  Junel FE 1.5/30 1.5-30 MG-MCG tablet         Must Take:   Bariatric multivitamins, daily for life  Calcium Citrate 1500 mg orally daily for life  Vitamin B12 1000 micrograms sublingual daily for life  Vitamin D3 5,000 units orally daily for life   Vitamin B1 100 mg orally daily for life    Discharge disposition: home    Discharge condition: stable      Local wound care with daily showers, keep wounds clean and dry     Activity: as desired, no lifting, pushing, or pulling greater than 15lbs or situps for 30 days     Special Instructions:            - No driving until activity is not influenced by incisional pain and off narcotics            - Hold hormone replacement for 30 days after surgery            - Discuss extended to standard release formulation with PCP             - No bath or hot tub until wounds are healed            - Check pulse and temperature twice daily for 10 days            - Notify [facility] for a Temp >100.5 or Pulse>115  - Schedule to see PCP within 30 days of surgery to discuss any medication or health status changes.     Follow-up with your surgeon in 2 weeks, call office for appointment [phone] ([place] Office) [phone]([facility])

## 2024-06-21 NOTE — DISCHARGE INSTRUCTIONS
DISCHARGE SUMMARY from Nurse    PATIENT INSTRUCTIONS:    After general anesthesia or intravenous sedation, for 24 hours or while taking prescription Narcotics:  Limit your activities  Do not drive and operate hazardous machinery  Do not make important personal or business decisions  Do  not drink alcoholic beverages  If you have not urinated within 8 hours after discharge, please contact your surgeon on call.    Report the following to your surgeon:  Excessive pain, swelling, redness or odor of or around the surgical area  Temperature over 100.5  Nausea and vomiting lasting longer than 4 hours or if unable to take medications  Any signs of decreased circulation or nerve impairment to extremity: change in color, persistent  numbness, tingling, coldness or increase pain  Any questions    What to do at Home:  Recommended activity: activity as tolerated,     If you experience any of the following symptoms Refer to [facility] Book[facility], please follow up with Dr. Mora.    *  Please give a list of your current medications to your Primary Care Provider.    *  Please update this list whenever your medications are discontinued, doses are      changed, or new medications (including over-the-counter products) are added.    *  Please carry medication information at all times in case of emergency situations.    These are general instructions for a healthy lifestyle:    No smoking/ No tobacco products/ Avoid exposure to second hand smoke  Surgeon General's Warning:  Quitting smoking now greatly reduces serious risk to your health.    Obesity, smoking, and sedentary lifestyle greatly increases your risk for illness    A healthy diet, regular physical exercise & weight monitoring are important for maintaining a healthy lifestyle    You may be retaining fluid if you have a history of heart failure or if you experience any of the following symptoms:  Weight gain of 3 pounds or more overnight or 5 pounds in a week, increased swelling in our  hands or feet or shortness of breath while lying flat in bed.  Please call your doctor as soon as you notice any of these symptoms; do not wait until your next office visit.    Patient armband removed and shredded   Thank you for enrolling in [facility]. Please follow the instructions below to securely access your online medical record. [facility] allows you to send messages to your doctor, view your test results, renew your prescriptions, schedule appointments, and more.     How Do I Sign Up?  In your Internet browser, go to [email or web address]  Click on the Sign Up Now link in the Sign In box. You will see the New Member Sign Up page.  Enter your [facility] Access Code exactly as it appears below. You will not need to use this code after you’ve completed the sign-up process. If you do not sign up before the expiration date, you must request a new code.  [facility] Access Code: Activation code not generated  Current [facility] Status: Active    Enter your Social Security Number ([id number]) and Date of Birth (mm/dd/yyyy) as indicated and click Submit. You will be taken to the next sign-up page.  Create a [facility] ID. This will be your [facility] login ID and cannot be changed, so think of one that is secure and easy to remember.  Create a [facility] password. You can change your password at any time.  Enter your Password Reset Question and Answer. This can be used at a later time if you forget your password.   Enter your e-mail address. You will receive e-mail notification when new information is available in [facility].  Click Sign Up. You can now view your medical record.     Additional Information  If you have questions, please contact your physician practice where you receive care. Remember, [facility] is NOT to be used for urgent needs. For medical emergencies, dial 911.   The discharge information has been reviewed with the {PATIENT PARENT GUARDIAN:[id number]}.  The {PATIENT PARENT GUARDIAN:[id number]} verbalized

## 2024-06-24 ENCOUNTER — FOLLOWUP TELEPHONE ENCOUNTER (OUTPATIENT)
Facility: HOSPITAL | Age: 34
End: 2024-06-24

## 2024-06-24 SDOH — HEALTH STABILITY: PHYSICAL HEALTH: ON AVERAGE, HOW MANY DAYS PER WEEK DO YOU ENGAGE IN MODERATE TO STRENUOUS EXERCISE (LIKE A BRISK WALK)?: 1 DAY

## 2024-06-24 SDOH — HEALTH STABILITY: PHYSICAL HEALTH: ON AVERAGE, HOW MANY MINUTES DO YOU ENGAGE IN EXERCISE AT THIS LEVEL?: 30 MIN

## 2024-06-24 NOTE — TELEPHONE ENCOUNTER
Pt is getting in 55 oz of fluids.  Pt is up ambulating.  Pt had a bowel movement.  No questions or concerns.  Pt instructed that she needs to get in 64 oz.  Pt verbalized understanding.

## 2024-06-25 ENCOUNTER — OFFICE VISIT (OUTPATIENT)
Facility: CLINIC | Age: 34
End: 2024-06-25
Payer: MEDICAID

## 2024-06-25 VITALS
SYSTOLIC BLOOD PRESSURE: 124 MMHG | WEIGHT: 212.6 LBS | OXYGEN SATURATION: 98 % | HEART RATE: 80 BPM | RESPIRATION RATE: 16 BRPM | BODY MASS INDEX: 39.12 KG/M2 | HEIGHT: 62 IN | DIASTOLIC BLOOD PRESSURE: 78 MMHG | TEMPERATURE: 98.3 F

## 2024-06-25 DIAGNOSIS — E66.09 CLASS 2 OBESITY DUE TO EXCESS CALORIES WITHOUT SERIOUS COMORBIDITY WITH BODY MASS INDEX (BMI) OF 38.0 TO 38.9 IN ADULT: ICD-10-CM

## 2024-06-25 DIAGNOSIS — Z76.89 ENCOUNTER TO ESTABLISH CARE: Primary | ICD-10-CM

## 2024-06-25 DIAGNOSIS — J45.20 MILD INTERMITTENT ASTHMA WITHOUT COMPLICATION: ICD-10-CM

## 2024-06-25 DIAGNOSIS — M54.50 ACUTE BILATERAL LOW BACK PAIN WITHOUT SCIATICA: ICD-10-CM

## 2024-06-25 DIAGNOSIS — M54.9 MID-BACK PAIN, ACUTE: ICD-10-CM

## 2024-06-25 DIAGNOSIS — K21.9 GASTRO-ESOPHAGEAL REFLUX DISEASE WITHOUT ESOPHAGITIS: ICD-10-CM

## 2024-06-25 PROCEDURE — 99204 OFFICE O/P NEW MOD 45 MIN: CPT | Performed by: FAMILY MEDICINE

## 2024-06-25 RX ORDER — OMEPRAZOLE 20 MG/1
20 CAPSULE, DELAYED RELEASE ORAL
Qty: 30 CAPSULE | Refills: 5 | Status: SHIPPED | OUTPATIENT
Start: 2024-06-25

## 2024-06-25 RX ORDER — NEBULIZER ACCESSORIES
1 KIT MISCELLANEOUS DAILY PRN
Qty: 1 KIT | Refills: 0 | Status: CANCELLED | OUTPATIENT
Start: 2024-06-25

## 2024-06-25 RX ORDER — HYDROCHLOROTHIAZIDE 25 MG/1
25 TABLET ORAL EVERY MORNING
COMMUNITY
Start: 2024-06-16

## 2024-06-25 RX ORDER — ALBUTEROL SULFATE 2.5 MG/3ML
2.5 SOLUTION RESPIRATORY (INHALATION) 4 TIMES DAILY PRN
Qty: 120 EACH | Refills: 3 | Status: CANCELLED | OUTPATIENT
Start: 2024-06-25

## 2024-06-25 SDOH — ECONOMIC STABILITY: HOUSING INSECURITY
IN THE LAST 12 MONTHS, WAS THERE A TIME WHEN YOU DID NOT HAVE A STEADY PLACE TO SLEEP OR SLEPT IN A SHELTER (INCLUDING NOW)?: NO

## 2024-06-25 SDOH — ECONOMIC STABILITY: FOOD INSECURITY: WITHIN THE PAST 12 MONTHS, THE FOOD YOU BOUGHT JUST DIDN'T LAST AND YOU DIDN'T HAVE MONEY TO GET MORE.: NEVER TRUE

## 2024-06-25 SDOH — ECONOMIC STABILITY: FOOD INSECURITY: WITHIN THE PAST 12 MONTHS, YOU WORRIED THAT YOUR FOOD WOULD RUN OUT BEFORE YOU GOT MONEY TO BUY MORE.: NEVER TRUE

## 2024-06-25 SDOH — ECONOMIC STABILITY: INCOME INSECURITY: HOW HARD IS IT FOR YOU TO PAY FOR THE VERY BASICS LIKE FOOD, HOUSING, MEDICAL CARE, AND HEATING?: NOT HARD AT ALL

## 2024-06-25 ASSESSMENT — PATIENT HEALTH QUESTIONNAIRE - PHQ9
SUM OF ALL RESPONSES TO PHQ QUESTIONS 1-9: 0
3. TROUBLE FALLING OR STAYING ASLEEP: NOT AT ALL
9. THOUGHTS THAT YOU WOULD BE BETTER OFF DEAD, OR OF HURTING YOURSELF: NOT AT ALL
7. TROUBLE CONCENTRATING ON THINGS, SUCH AS READING THE NEWSPAPER OR WATCHING TELEVISION: NOT AT ALL
SUM OF ALL RESPONSES TO PHQ QUESTIONS 1-9: 0
6. FEELING BAD ABOUT YOURSELF - OR THAT YOU ARE A FAILURE OR HAVE LET YOURSELF OR YOUR FAMILY DOWN: NOT AT ALL
4. FEELING TIRED OR HAVING LITTLE ENERGY: NOT AT ALL
2. FEELING DOWN, DEPRESSED OR HOPELESS: NOT AT ALL
10. IF YOU CHECKED OFF ANY PROBLEMS, HOW DIFFICULT HAVE THESE PROBLEMS MADE IT FOR YOU TO DO YOUR WORK, TAKE CARE OF THINGS AT HOME, OR GET ALONG WITH OTHER PEOPLE: NOT DIFFICULT AT ALL
SUM OF ALL RESPONSES TO PHQ9 QUESTIONS 1 & 2: 0
8. MOVING OR SPEAKING SO SLOWLY THAT OTHER PEOPLE COULD HAVE NOTICED. OR THE OPPOSITE, BEING SO FIGETY OR RESTLESS THAT YOU HAVE BEEN MOVING AROUND A LOT MORE THAN USUAL: NOT AT ALL
SUM OF ALL RESPONSES TO PHQ QUESTIONS 1-9: 0
5. POOR APPETITE OR OVEREATING: NOT AT ALL
SUM OF ALL RESPONSES TO PHQ QUESTIONS 1-9: 0
1. LITTLE INTEREST OR PLEASURE IN DOING THINGS: NOT AT ALL

## 2024-06-25 ASSESSMENT — ENCOUNTER SYMPTOMS
EYE PAIN: 0
ABDOMINAL PAIN: 1
BACK PAIN: 1
COUGH: 0
DIARRHEA: 0
SHORTNESS OF BREATH: 0
CONSTIPATION: 0

## 2024-06-25 NOTE — PROGRESS NOTES
[facility]  Establish care visit   2024    Rosa Houston (: 1990) is a 33 y.o. female, here to establish care.    Chief Complaint   Patient presents with    Establish Care    Asthma     The patient is requesting a new nebulizer         ASSESSMENT/ PLAN  1. Encounter to establish care  VS reviewed     BMI reviewed   Appropriate healthy lifestyle modifications discussed.  All questions answered.   F/u discussed.    2. Mild intermittent asthma without complication  Controlled: Appears stable.  We will continue current management and monitor for adverse reaction and disease progression.  Follow-up as noted below    3.  Class II obesity due to excess calories without serious comorbidity with body mass index of 38.0-38.9 in adult  Uncontrolled but on her weight loss journey after gastric sleeve procedure. Follow up with bariatric surgery and dietician/nutrition team. Abdominal pain appropriate. Having trouble with capsule meds.     4. Acute bilateral low back pain without sciatica  Likely musculoskeletal strain due to hospital bed. HEP provided. Follow up in 4 weeks. If no improvement, will refer to PT.     5. Mid-back pain, acute  Likely musculoskeletal strain due to hospital bed. HEP provided. Follow up in 4 weeks. If no improvement, will refer to PT.     6. Gastro-esophageal reflux disease without esophagitis  Controlled: Appears stable.  We will continue current management and monitor for adverse reaction and disease progression.  Follow-up as noted below  - omeprazole (PRILOSEC) 20 MG delayed release capsule; Take 1 capsule by mouth every morning (before breakfast) Must open capsule for first 30 days after surgery.  Dispense: 30 capsule; Refill: 5       I have spent 45 minutes on chart review, care coordination and patient counseling regarding disease state, lifestyle modifications and/or health maintenance screening.       Return in about 4 weeks (around 2024).    Future

## 2024-06-25 NOTE — PROGRESS NOTES
\"Have you been to the ER, urgent care clinic since your last visit?  Hospitalized since your last visit?\"    NO    “Have you seen or consulted any other health care providers outside of [facility] since your last visit?”    NO      The patient would like all capsule medications changed to tablet options.       Click Here for Release of Records Request

## 2024-07-08 ENCOUNTER — OFFICE VISIT (OUTPATIENT)
Age: 34
End: 2024-07-08

## 2024-07-08 VITALS
HEIGHT: 62 IN | WEIGHT: 204 LBS | DIASTOLIC BLOOD PRESSURE: 74 MMHG | TEMPERATURE: 97 F | OXYGEN SATURATION: 97 % | BODY MASS INDEX: 37.54 KG/M2 | HEART RATE: 80 BPM | RESPIRATION RATE: 18 BRPM | SYSTOLIC BLOOD PRESSURE: 120 MMHG

## 2024-07-08 DIAGNOSIS — K91.2 POSTSURGICAL MALABSORPTION: ICD-10-CM

## 2024-07-08 DIAGNOSIS — E66.01 OBESITY, MORBID (HCC): ICD-10-CM

## 2024-07-08 DIAGNOSIS — E11.9 TYPE 2 DIABETES MELLITUS WITHOUT COMPLICATION, WITHOUT LONG-TERM CURRENT USE OF INSULIN (HCC): ICD-10-CM

## 2024-07-08 DIAGNOSIS — K21.9 GASTRO-ESOPHAGEAL REFLUX DISEASE WITHOUT ESOPHAGITIS: ICD-10-CM

## 2024-07-08 DIAGNOSIS — Z13.21 MALNUTRITION SCREEN: ICD-10-CM

## 2024-07-08 DIAGNOSIS — Z98.84 BARIATRIC SURGERY STATUS: ICD-10-CM

## 2024-07-08 DIAGNOSIS — J45.909 ASTHMA, UNSPECIFIED ASTHMA SEVERITY, UNSPECIFIED WHETHER COMPLICATED, UNSPECIFIED WHETHER PERSISTENT: ICD-10-CM

## 2024-07-08 DIAGNOSIS — Z98.84 BARIATRIC SURGERY STATUS: Primary | ICD-10-CM

## 2024-07-08 PROCEDURE — 99024 POSTOP FOLLOW-UP VISIT: CPT | Performed by: SURGERY

## 2024-07-08 RX ORDER — VENLAFAXINE HYDROCHLORIDE 150 MG/1
150 CAPSULE, EXTENDED RELEASE ORAL DAILY
COMMUNITY

## 2024-07-08 RX ORDER — URSODIOL 200 MG/1
200 CAPSULE ORAL SEE ADMIN INSTRUCTIONS
Qty: 90 CAPSULE | Refills: 5 | Status: SHIPPED | OUTPATIENT
Start: 2024-07-08

## 2024-07-08 NOTE — PROGRESS NOTES
Chief Complaint   Patient presents with    Post-Op Check     6/20/2024 gastric sleeve     1. Have you been to the ER, urgent care clinic since your last visit?  Hospitalized since your last visit?No    2. Have you seen or consulted any other health care providers outside of the [facility] since your last visit?  Include any pap smears or colon screening. Yes pcp Pt ID confirmed        7/8/2024    11:33 AM 6/25/2024     9:45 AM 6/21/2024     8:10 AM 6/21/2024     3:51 AM 6/21/2024     2:38 AM 6/21/2024    12:47 AM 6/20/2024    11:48 PM   Ambulatory Bariatric Summary   Systolic  124 128  113     Diastolic  78 76  74     Pulse  80 58  59     Temp 97 °F (36.1 °C) 98.3 °F (36.8 °C) 97.8 °F (36.6 °C)  97.4 °F (36.3 °C)     Respirations 18 16 18 20 18  20   Weight - Scale 204 212.6    230    Height 1.575 m (5' 2\") 1.575 m (5' 2\")    1.575 m (5' 2\")    BMI 37.4 kg/m2 39 kg/m2    42.2 kg/m2    Weight - Scale 92.5 kg (204 lb) 96.4 kg (212 lb 9.6 oz)    104.3 kg (230 lb)    BMI (Calculated) 37.4 39    42.2        Body mass index is 37.31 kg/m².   Bariatric Postoperative Nurse Note      Rosa Houston is a 33 y.o. female status post laparoscopic sleeve gastrectomy performed on 6/20/2024     Two Week Post-Op only  -Fevers/chills? No  -Chest pain? No  -Shortness of breath? No  -Peripheral swelling (arms/legs)? No  -# of total opioid doses taken postop? 2  -ER visits/readmission? No    All Post-Ops (including two weeks)  -# of grams of protein daily? 45-60 gm   -sources of protein? Shakes egg yogurt  -# of oz of sugar free fluids from all sources daily?  32 48  -Nausea? No  -Vomiting? No  -Difficulty swallow/food sticking? No has not been eating many solids due to feeling of pressure   -Heartburn/regurgitation? No  -Character of bowel movements (diarrhea/constipation/bloody stools?) regular  -Which multivitamin product are you taking? Flintstones   -What dose and how frequently are you taking calcium citrate? not

## 2024-07-08 NOTE — PROGRESS NOTES
Bariatric Surgery Post Op Progress Note    CC: follow up r-HHR with LSG  Subjective:     Rosa Houston  is a 33 y.o. female who presents for follow-up after r-HHR with LSG.. She has lost a total of 27 pounds since surgery. Body mass index is 37.31 kg/m²..     Path benign    -Fevers/chills? No  -Chest pain? No  -Shortness of breath? No  -Peripheral swelling (arms/legs)? No  -# of total opioid doses taken postop? 2  -ER visits/readmission? No     All Post-Ops (including two weeks)  -# of grams of protein daily? 45-60 gm   -sources of protein? Shakes egg yogurt beans  -# of oz of sugar free fluids from all sources daily?  32-48  -Nausea? No  -Vomiting? No  -Difficulty swallow/food sticking? No has not been eating many solids due to feeling of pressure   -Heartburn/regurgitation? No  -Character of bowel movements (diarrhea/constipation/bloody stools?) regular  -Which multivitamin product are you taking? Flintstones   -What dose and how frequently are you taking calcium citrate? not started  - from any iron-containing multivitamin by 2 hours?  Is aware  -Ulcer risk exposures:   NSAID No  Tobacco No  Alcohol No  Steroids No  -Minutes of physical activity and what type? biking daily 20 min    Changes in their medical history and medications have been reviewed.    Comorbidities:   GERD, asthma, HTN, NIDDM2    Patient Active Problem List   Diagnosis    Anxiety    Obesity, morbid (HCC)    Neuropathy due to chemotherapeutic drug (HCC)    Gastro-esophageal reflux disease without esophagitis    Asthma    Depression    Morbid (severe) obesity due to excess calories (HCC)     Past Medical History:   Diagnosis Date    Anxiety     Asthma     well controlled    Cancer (HCC)     left ovary h/o    Coagulation disorder (HCC)     Depression 12/23/2014    Dyspepsia and other specified disorders of function of stomach     Gastro-esophageal reflux disease without esophagitis 07/17/2015    Hypertension 2020    Hypokalemia 9/8/2014

## 2024-07-15 ENCOUNTER — PATIENT MESSAGE (OUTPATIENT)
Facility: CLINIC | Age: 34
End: 2024-07-15

## 2024-07-17 RX ORDER — BETAMETHASONE DIPROPIONATE 0.5 MG/G
CREAM TOPICAL
Qty: 45 G | Refills: 1 | Status: SHIPPED | OUTPATIENT
Start: 2024-07-17

## 2024-07-17 NOTE — TELEPHONE ENCOUNTER
From: Rosa Houston  To: Dr. Emily Rubio  Sent: 7/15/2024 4:40 PM EDT  Subject: Refill     Good afternoon Dr.Ashlie Rubio hope all  Is well. I have been having a horrible eczema flareup is there anyway possible you can refill my prescription for betamethasone dipropionate 0.05 % cream. I desperately need this. Thanks in advance

## 2024-07-22 RX ORDER — GABAPENTIN 300 MG/1
CAPSULE ORAL
COMMUNITY
Start: 2024-07-12 | End: 2024-07-23

## 2024-07-22 NOTE — PROGRESS NOTES
[facility]  2024    Rosa Houston (: 1990) is a 33 y.o. female, here for the following medical concerns:    Chief Complaint   Patient presents with    Asthma    Skin Problem     Eczema    Obesity    Gastroesophageal Reflux    Gas     Patient requesting chewable         ASSESSMENT/ PLAN  1. Acute bilateral low back pain without sciatica  Resolved.     2. Mid-back pain, acute  Resolved.     3. History of ovarian cancer  New referral needed for monitoring.   - External Referral To Oncology    4. Eczema, unspecified type  Controlled. Needs refills. Follow up PRN. If worsens over time, will refer to derm.   - betamethasone dipropionate 0.05 % cream; Apply topically 2 times daily Apply topically 2 times daily.  Dispense: 45 g; Refill: 3  - betamethasone dipropionate 0.05 % lotion; Apply topically 2 times daily Apply topically 2 times daily.  Dispense: 60 mL; Refill: 3  - betamethasone dipropionate 0.05 % ointment; Apply topically 2 times daily Apply topically 2 times daily.  Dispense: 50 g; Refill: 3       I have spent 30 minutes on chart review, care coordination and patient counseling regarding disease state, lifestyle modifications and/or health maintenance screening.      Return in about 1 year (around 2025).    Future Appointments   Date Time Provider Department Center   2024  9:00 AM [facility]   10/14/2024  8:45 AM Merlin Mora MD [facility]         HPI  LOV 24 addressed mid and LBP with HEP. If no improvement in 4 weeks, will refer to PT.     Since LOV, her back pain has resolved.     She is bothered by her eczema and needs refills of topical meds.     Losing weight conservatively and doing great! Praised her for this.     Wt Readings from Last 3 Encounters:   24 90.3 kg (199 lb)   24 92.5 kg (204 lb)   24 96.4 kg (212 lb 9.6 oz)     ROS  Review of Systems   Constitutional:  Negative for appetite change, chills,

## 2024-07-23 ENCOUNTER — OFFICE VISIT (OUTPATIENT)
Facility: CLINIC | Age: 34
End: 2024-07-23
Payer: MEDICAID

## 2024-07-23 VITALS
SYSTOLIC BLOOD PRESSURE: 122 MMHG | DIASTOLIC BLOOD PRESSURE: 70 MMHG | OXYGEN SATURATION: 99 % | TEMPERATURE: 98.3 F | RESPIRATION RATE: 16 BRPM | HEIGHT: 62 IN | HEART RATE: 78 BPM | WEIGHT: 199 LBS | BODY MASS INDEX: 36.62 KG/M2

## 2024-07-23 DIAGNOSIS — M54.9 MID-BACK PAIN, ACUTE: ICD-10-CM

## 2024-07-23 DIAGNOSIS — L30.9 ECZEMA, UNSPECIFIED TYPE: ICD-10-CM

## 2024-07-23 DIAGNOSIS — Z85.43 HISTORY OF OVARIAN CANCER: ICD-10-CM

## 2024-07-23 DIAGNOSIS — M54.50 ACUTE BILATERAL LOW BACK PAIN WITHOUT SCIATICA: Primary | ICD-10-CM

## 2024-07-23 PROCEDURE — 99214 OFFICE O/P EST MOD 30 MIN: CPT | Performed by: FAMILY MEDICINE

## 2024-07-23 RX ORDER — BETAMETHASONE DIPROPIONATE 0.05 %
OINTMENT (GRAM) TOPICAL 2 TIMES DAILY
Qty: 50 G | Refills: 3 | Status: SHIPPED | OUTPATIENT
Start: 2024-07-23 | End: 2024-07-23

## 2024-07-23 RX ORDER — BETAMETHASONE DIPROPIONATE 0.5 MG/G
LOTION TOPICAL 2 TIMES DAILY
Qty: 60 ML | Refills: 3 | Status: SHIPPED | OUTPATIENT
Start: 2024-07-23

## 2024-07-23 RX ORDER — BETAMETHASONE DIPROPIONATE 0.5 MG/G
CREAM TOPICAL 2 TIMES DAILY
COMMUNITY
End: 2024-07-23 | Stop reason: SDUPTHER

## 2024-07-23 RX ORDER — BETAMETHASONE DIPROPIONATE 0.05 %
OINTMENT (GRAM) TOPICAL 2 TIMES DAILY
COMMUNITY
End: 2024-07-23 | Stop reason: SDUPTHER

## 2024-07-23 RX ORDER — BETAMETHASONE DIPROPIONATE 0.5 MG/G
CREAM TOPICAL 2 TIMES DAILY
Qty: 45 G | Refills: 3 | Status: SHIPPED | OUTPATIENT
Start: 2024-07-23

## 2024-07-23 RX ORDER — BETAMETHASONE DIPROPIONATE 0.5 MG/G
LOTION TOPICAL 2 TIMES DAILY
COMMUNITY
End: 2024-07-23 | Stop reason: SDUPTHER

## 2024-07-23 RX ORDER — BETAMETHASONE DIPROPIONATE 0.05 %
OINTMENT (GRAM) TOPICAL 2 TIMES DAILY
Qty: 50 G | Refills: 3 | Status: SHIPPED | OUTPATIENT
Start: 2024-07-23

## 2024-07-23 RX ORDER — BETAMETHASONE DIPROPIONATE 0.5 MG/G
CREAM TOPICAL 2 TIMES DAILY
Qty: 45 G | Refills: 3 | Status: SHIPPED | OUTPATIENT
Start: 2024-07-23 | End: 2024-07-23

## 2024-07-23 RX ORDER — BETAMETHASONE DIPROPIONATE 0.5 MG/G
LOTION TOPICAL 2 TIMES DAILY
Qty: 60 ML | Refills: 3 | Status: SHIPPED | OUTPATIENT
Start: 2024-07-23 | End: 2024-07-23

## 2024-07-23 ASSESSMENT — ENCOUNTER SYMPTOMS
COUGH: 0
DIARRHEA: 0
CONSTIPATION: 0
ABDOMINAL PAIN: 0
EYE PAIN: 0
SHORTNESS OF BREATH: 0

## 2024-07-23 NOTE — PATIENT INSTRUCTIONS

## 2024-07-23 NOTE — PROGRESS NOTES
\"Have you been to the ER, urgent care clinic since your last visit?  Hospitalized since your last visit?\"    NO    “Have you seen or consulted any other health care providers outside of [facility] since your last visit?”    NO      Patient is requesting a referral for a new Oncologist       Click Here for Release of Records Request

## 2024-08-02 ENCOUNTER — CLINICAL DOCUMENTATION (OUTPATIENT)
Facility: HOSPITAL | Age: 34
End: 2024-08-02

## 2024-08-02 NOTE — PROGRESS NOTES
[facility]  POST-OP NUTRITION FOLLOW UP    Patient's Name: Rosa Houston  YOB: 1990  Surgery Date: 24      Procedure: Sleeve    Surgeon: Dr. Mora    Height: 5 f 2     Pre-Op Weight: 231     Current Weight: 194  Weight Lost: 37      Attendance of support group:   When:   Why not:     Complications  Readmittance: None  Reoperations: None  Complications: None  IV Fluids: None  ER Trips: NOne    Problem Areas:   Nausea: None   Vomiting: None   Dumping Syndrome: None  Inadequate Protein: None, patient states she is getting 2 shakes per day  Inadequate Fluids: None, patient is getting 64 ounces of fluid  Food Intolerance:   Hunger: None  Constipation: None    Eating 3 Meals/Day:1 meal plus 2 shakes  Portion Size at Meals:  1 ounce protein and 1 ounce of vegetables     Protein from Food:     Foods being consumed:  Breakfast: Time:protein shake    Lunch: Time: protein shake     Dinner:  Time: chicken, beans and some vegetables     In-between eating: None    Length of time for meals: 20 Minutes    food/fluids: 30/30    Fluids: 64 oz/day   Types of Fluids:     MVI: Pro Care    Number/Day: 1 x a day   Taken Separately:   Frequency of taking this:  7 out 7 days in a week.    Vitamin B1: included in MVI  Dosin mg    Calcium: calcium citrate    Calcium Dosing: BID    Taken Separately: Yes    Vitamin B12: included in MVI   Vitamin B12 Dosin mcg    Vitamin D: included in MVI and Calcium     Vitamin D dosin IU    Iron: Yes    Iron dosin mg     Protein Supplement: Fair LIfe     Grams of Protein: 30 BID   Mixed with: 60     Splitting Protein Drink in :    Timing of Protein Drinks:   Patient is taking 7 days a week.    Exercise: 30 minutes of walking 3 x a day      Comments:    Patient is doing well at 6 weeks post op.  She is eating 1 meal and drinking 2 protein drinks.  She states that some foods just feel uncomfortable, so she is drinking more of the

## 2024-10-03 ENCOUNTER — TELEPHONE (OUTPATIENT)
Age: 34
End: 2024-10-03

## 2024-10-08 NOTE — PROGRESS NOTES
10/9/2024    TELEHEALTH EVALUATION -- Audio/Visual (During COVID-19 public health emergency)    HPI:    Rosa Houston (:  1990) has requested an audio/video evaluation for the following concern(s):    Migraines   Went to urgent care yesterday for migraines. Toradol shot at urgent care. DCed home with script for zofran and sumatriptan 25 mg daily PRN. She feels fine today. She believes the migraine was from stress.     Looking for a note for work on Friday and also looking to get refills.     Explained that I cannot give her a note for Friday because I did not see her that day.     I can refill ativan, Singulair and hydroxyzine for anxiety, asthma and sleep, respectively.   No issues with these problems, per patient.       Review of Systems   Constitutional:  Negative for appetite change, chills, fatigue and fever.   HENT:  Negative for congestion.    Eyes:  Negative for pain and visual disturbance.   Respiratory:  Negative for cough and shortness of breath.    Cardiovascular:  Negative for chest pain and palpitations.   Gastrointestinal:  Negative for abdominal pain, constipation and diarrhea.   Genitourinary:  Negative for difficulty urinating.   Musculoskeletal:  Negative for arthralgias.   Skin:  Negative for rash and wound.   Neurological:  Positive for headaches. Negative for dizziness, weakness and light-headedness.   Hematological:  Does not bruise/bleed easily.   Psychiatric/Behavioral:  Positive for sleep disturbance. Negative for behavioral problems.        Prior to Visit Medications    Medication Sig Taking? Authorizing Provider   montelukast (SINGULAIR) 10 MG tablet Take 1 tablet by mouth daily Yes Emily Rubio MD   hydrOXYzine HCl (ATARAX) 25 MG tablet Take 1 tablet by mouth daily as needed (sleep) Yes Emily Rubio MD   LORazepam (ATIVAN) 0.5 MG tablet Take 1 tablet by mouth every 6 hours as needed for Anxiety for up to 30 days. As needed Max Daily Amount: 2 mg Yes Joanne

## 2024-10-09 ENCOUNTER — TELEMEDICINE (OUTPATIENT)
Facility: CLINIC | Age: 34
End: 2024-10-09
Payer: MEDICAID

## 2024-10-09 DIAGNOSIS — G43.809 OTHER MIGRAINE WITHOUT STATUS MIGRAINOSUS, NOT INTRACTABLE: Primary | ICD-10-CM

## 2024-10-09 DIAGNOSIS — F41.9 ANXIETY: ICD-10-CM

## 2024-10-09 DIAGNOSIS — J45.909 ASTHMA, UNSPECIFIED ASTHMA SEVERITY, UNSPECIFIED WHETHER COMPLICATED, UNSPECIFIED WHETHER PERSISTENT: ICD-10-CM

## 2024-10-09 DIAGNOSIS — G47.00 INSOMNIA, UNSPECIFIED TYPE: ICD-10-CM

## 2024-10-09 PROCEDURE — 99214 OFFICE O/P EST MOD 30 MIN: CPT | Performed by: FAMILY MEDICINE

## 2024-10-09 RX ORDER — LORAZEPAM 0.5 MG/1
0.5 TABLET ORAL EVERY 6 HOURS PRN
Qty: 30 TABLET | Refills: 0 | Status: SHIPPED | OUTPATIENT
Start: 2024-10-09 | End: 2024-11-08

## 2024-10-09 RX ORDER — HYDROXYZINE HYDROCHLORIDE 25 MG/1
25 TABLET, FILM COATED ORAL DAILY PRN
Qty: 90 TABLET | Refills: 3 | Status: SHIPPED | OUTPATIENT
Start: 2024-10-09

## 2024-10-09 RX ORDER — MONTELUKAST SODIUM 10 MG/1
10 TABLET ORAL DAILY
Qty: 90 TABLET | Refills: 3 | Status: SHIPPED | OUTPATIENT
Start: 2024-10-09

## 2024-10-09 ASSESSMENT — ENCOUNTER SYMPTOMS
SHORTNESS OF BREATH: 0
CONSTIPATION: 0
EYE PAIN: 0
DIARRHEA: 0
ABDOMINAL PAIN: 0
COUGH: 0

## 2024-10-09 NOTE — PROGRESS NOTES
\"Have you been to the ER, urgent care clinic since your last visit?  Hospitalized since your last visit?\"    NO    “Have you seen or consulted any other health care providers outside our system since your last visit?”    YES - When: approximately 1 months ago.  Where and Why: [facility].

## 2024-10-10 ENCOUNTER — HOSPITAL ENCOUNTER (OUTPATIENT)
Facility: HOSPITAL | Age: 34
Setting detail: SPECIMEN
Discharge: HOME | End: 2024-10-13

## 2024-10-10 LAB — SENTARA SPECIMEN COLLECTION: NORMAL

## 2024-10-10 PROCEDURE — 99001 SPECIMEN HANDLING PT-LAB: CPT

## 2024-10-11 LAB
A/G RATIO: 1.5 RATIO (ref 1.1–2.6)
ALBUMIN: 4 G/DL (ref 3.5–5)
ALP BLD-CCNC: 64 U/L (ref 25–115)
ALT SERPL-CCNC: 12 U/L (ref 5–40)
ANION GAP SERPL CALCULATED.3IONS-SCNC: 9 MMOL/L (ref 3–15)
AST SERPL-CCNC: 16 U/L (ref 10–37)
BASOPHILS ABSOLUTE: 0.1 K/UL (ref 0–0.2)
BASOPHILS RELATIVE PERCENT: 1 % (ref 0–2)
BILIRUB SERPL-MCNC: 0.2 MG/DL (ref 0.2–1.2)
BUN BLDV-MCNC: 13 MG/DL (ref 6–22)
CALCIUM SERPL-MCNC: 9.3 MG/DL (ref 8.4–10.5)
CHLORIDE BLD-SCNC: 103 MMOL/L (ref 98–110)
CO2: 26 MMOL/L (ref 20–32)
CREAT SERPL-MCNC: 0.8 MG/DL (ref 0.5–1.2)
EOSINOPHIL # BLD: 3 % (ref 0–6)
EOSINOPHILS ABSOLUTE: 0.2 K/UL (ref 0–0.5)
ESTIMATED AVERAGE GLUCOSE: 113 MG/DL (ref 91–123)
FERRITIN: 31 NG/ML (ref 10–291)
FOLATE: 8.2 NG/ML
GFR, ESTIMATED: >60 ML/MIN/1.73 SQ.M.
GLOBULIN: 2.7 G/DL (ref 2–4)
GLUCOSE: 132 MG/DL (ref 70–99)
HBA1C MFR BLD: 5.6 % (ref 4.8–5.6)
HCT VFR BLD CALC: 41.9 % (ref 35.1–46.5)
HEMOGLOBIN: 13.4 G/DL (ref 11.7–15.5)
IRON % SATURATION: 21 % (ref 20–50)
IRON: 71 MCG/DL (ref 30–160)
LYMPHOCYTES # BLD: 25 % (ref 20–45)
LYMPHOCYTES ABSOLUTE: 1.4 K/UL (ref 1–4.8)
MCH RBC QN AUTO: 29 PG (ref 26–34)
MCHC RBC AUTO-ENTMCNC: 32 G/DL (ref 31–36)
MCV RBC AUTO: 92 FL (ref 80–99)
MONOCYTES ABSOLUTE: 0.6 K/UL (ref 0.1–1)
MONOCYTES: 9 % (ref 3–12)
NEUTROPHILS ABSOLUTE: 3.6 K/UL (ref 1.8–7.7)
NEUTROPHILS: 62 % (ref 40–75)
PDW BLD-RTO: 15.6 % (ref 10–15.5)
PLATELET # BLD: 275 K/UL (ref 140–440)
PMV BLD AUTO: 10.9 FL (ref 9–13)
POTASSIUM SERPL-SCNC: 4.2 MMOL/L (ref 3.5–5.5)
RBC # BLD: 4.58 M/UL (ref 3.8–5.2)
SODIUM BLD-SCNC: 138 MMOL/L (ref 133–145)
TOTAL IRON BINDING CAPACITY: 336 MCG/DL (ref 228–428)
TOTAL PROTEIN: 6.7 G/DL (ref 6.4–8.3)
UIBC: 265 MCG/DL (ref 110–370)
VITAMIN B-12: 823 PG/ML (ref 211–911)
VITAMIN D 25-HYDROXY: 43.5 NG/ML (ref 32–100)
WBC # BLD: 5.8 K/UL (ref 4–11)

## 2024-10-14 LAB — THIAMINE BLOOD: 86 NMOL/L (ref 78–185)

## 2024-10-15 ENCOUNTER — OFFICE VISIT (OUTPATIENT)
Age: 34
End: 2024-10-15
Payer: MEDICAID

## 2024-10-15 VITALS
HEART RATE: 84 BPM | HEIGHT: 62 IN | RESPIRATION RATE: 16 BRPM | SYSTOLIC BLOOD PRESSURE: 133 MMHG | TEMPERATURE: 97.3 F | WEIGHT: 186.6 LBS | DIASTOLIC BLOOD PRESSURE: 77 MMHG | BODY MASS INDEX: 34.34 KG/M2

## 2024-10-15 DIAGNOSIS — K21.9 GASTRO-ESOPHAGEAL REFLUX DISEASE WITHOUT ESOPHAGITIS: ICD-10-CM

## 2024-10-15 DIAGNOSIS — J45.909 ASTHMA, UNSPECIFIED ASTHMA SEVERITY, UNSPECIFIED WHETHER COMPLICATED, UNSPECIFIED WHETHER PERSISTENT: ICD-10-CM

## 2024-10-15 DIAGNOSIS — E11.9 TYPE 2 DIABETES MELLITUS WITHOUT COMPLICATION, WITHOUT LONG-TERM CURRENT USE OF INSULIN (HCC): ICD-10-CM

## 2024-10-15 DIAGNOSIS — Z13.21 MALNUTRITION SCREEN: ICD-10-CM

## 2024-10-15 DIAGNOSIS — Z98.84 BARIATRIC SURGERY STATUS: Primary | ICD-10-CM

## 2024-10-15 PROCEDURE — 3044F HG A1C LEVEL LT 7.0%: CPT | Performed by: SURGERY

## 2024-10-15 PROCEDURE — 99214 OFFICE O/P EST MOD 30 MIN: CPT | Performed by: SURGERY

## 2024-10-15 RX ORDER — URSODIOL 200 MG/1
200 CAPSULE ORAL SEE ADMIN INSTRUCTIONS
Qty: 90 CAPSULE | Refills: 5 | Status: SHIPPED | OUTPATIENT
Start: 2024-10-15

## 2024-10-15 NOTE — PROGRESS NOTES
Bariatric Postoperative Nurse Note      Rosa Houston is a 34 y.o. female status post laparoscopic sleeve gastrectomy performed on 6/20/2024.      All Post-Ops (including two weeks)  -# of grams of protein daily? unknown  -sources of protein? Chicken and turkey  -# of oz of sugar free fluids from all sources daily?  64 oz  -Nausea? No  -Vomiting? No  -Difficulty swallow/food sticking? No  -Heartburn/regurgitation? No  -Character of bowel movements (diarrhea/constipation/bloody stools?) regular  -Which multivitamin product are you taking? Procare   -What dose and how frequently are you taking calcium citrate? not talking  - from any iron-containing multivitamin by 2 hours? No  -Ulcer risk exposures:   NSAID No  Tobacco No  Alcohol No  Steroids No  -Minutes of physical activity and what type? walking 30 minutes twice a week

## 2024-10-15 NOTE — PROGRESS NOTES
Bariatric Postoperative Progress Note    Rosa Houston is a 34 y.o. female now 4 months status post r-HHR with LSG performed on 6/20/24.     -# of grams of protein daily? unknown  -sources of protein? Chicken and turkey   -# of oz of sugar free fluids from all sources daily?  64 oz   -Nausea? No   -Vomiting? No   -Difficulty swallow/food sticking? No   -Heartburn/regurgitation? No   -Character of bowel movements (diarrhea/constipation/bloody stools?) regular   -Which multivitamin product are you taking? Procare   -What dose and how frequently are you taking calcium citrate? not talking   - from any iron-containing multivitamin by 2 hours? No  -Ulcer risk exposures:   NSAID No  Tobacco No  Alcohol No  Steroids No  -Minutes of physical activity and what type? walking 30 minutes twice a week        10/15/2024     3:27 PM 7/23/2024    10:36 AM 7/8/2024    11:33 AM 6/25/2024     9:45 AM 6/21/2024    12:47 AM 5/9/2024    10:29 AM 5/6/2024     2:15 PM   Weight Loss Metrics   Pre-op weight (manual entry) 231 lbs  231 lbs    231 lbs   Height 5' 2\" 5' 2\" 5' 2\" 5' 2\" 5' 2\" 5' 2\" 5' 2\"   Weight - Scale 186 lbs 10 oz 199 lbs 204 lbs 212 lbs 10 oz 230 lbs 230 lbs 231 lbs   BMI (Calculated) 34.2 kg/m2 36.5 kg/m2 37.4 kg/m2 39 kg/m2 42.2 kg/m2 42.2 kg/m2 42.3 kg/m2   Ideal body weight (manual entry) 125 lbs  125 lbs    125 lbs   EBW in lbs. 106  106    106   Weight loss to date in lbs. 44  27    0   Percent weight loss (%) 19.22 %  11.69 %    0 %   Percent EBW loss (%) 41.5 %  25.5 %    0 %     Comorbidities:  Hypertension: resolved  Diabetes: remission  Obstructive Sleep Apnea: not applicable  Hyperlipidemia: not applicable  Stress Urinary Incontinence: not applicable  Gastroesophageal Reflux: resolved  Weight related arthropathy:not applicable      Past Medical History:   Diagnosis Date    Anxiety     Asthma     well controlled    Cancer (HCC)     left ovary h/o    Coagulation disorder (HCC)     Depression 12/23/2014

## 2024-11-19 ENCOUNTER — HOSPITAL ENCOUNTER (OUTPATIENT)
Facility: HOSPITAL | Age: 34
Discharge: HOME | End: 2024-11-22

## 2024-11-19 ENCOUNTER — CLINICAL DOCUMENTATION (OUTPATIENT)
Facility: HOSPITAL | Age: 34
End: 2024-11-19

## 2024-11-19 NOTE — PROGRESS NOTES
[facility]  POST-OP NUTRITION FOLLOW UP    Patient's Name: Rosa Houston  YOB: 1990  Surgery Date: 24      Procedure: Gastric Sleeve    Surgeon: Dr. Mora    Height: 5 f 2     Pre-Op Weight: 231     Current Weight: 185  Weight Lost: 46    BMI:  33.9  Last recorded weight with RD: 24: 194      Eating 3 Meals/Day: Yes  Portion Size at Meals: palm to hand size.  She states that sometimes she will eat, get full, and stop, whereas other times, she will go back to it.     Protein from Food: Yes    Foods being consumed:  Breakfast: Time: 8 am:  Premier vanilla shake  9-10 am:  She state she will get a skinny pop popcorn or she would do 15 gram yogurt with 1/4 cup strawberries  She considers this a snack.    Lunch: Time: 12 pm:  If she had a left over from dinner, she would eat that.  Or she would have a chick-itz dressing with grilled chicken and a half a pack of the avocado lime dressing.  Lunch-Dinner:  Water or Atkins Jamal's cup     Dinner:  Time: 5:00 pm:   She states she may make salmon bites or stir mcallister with cauliflower rice.    In-between eating: see above    Length of time for meals:     food/fluids: yes    Fluids: 64 oz/day   Types of Fluids:     MVI: Pro Care    Number/Day: 1   Taken Separately:   Frequency of taking this:  7 out 7 days in a week.    Vitamin B1: included in MVI  Dosin mg    Calcium: calcium citrate    Calcium Dosin mg    Taken Separately:     Vitamin B12: included in MVI   Vitamin B12 Dosin mcg    Vitamin D: Yes     Vitamin D dosin IU    Iron:     Iron dosing:      Protein Supplement: Premier     Grams of Protein: 30   Mixed with:      Splitting Protein Drink in /2:    Timing of Protein Drinks:   Patient is taking 7 days a week.    Exercise: Walking the mall 2-3 x a week and doing free weights at home.  We talked about increasing her activity to 5 days a week.          Comments:    Patient states she has slipped

## 2025-01-12 DIAGNOSIS — K21.9 GASTRO-ESOPHAGEAL REFLUX DISEASE WITHOUT ESOPHAGITIS: ICD-10-CM

## 2025-01-14 NOTE — TELEPHONE ENCOUNTER
This patient contacted the office for the following prescriptions to be refilled:    Medication requested :   Requested Prescriptions     Pending Prescriptions Disp Refills    omeprazole (PRILOSEC) 20 MG delayed release capsule [Pharmacy Med Name: OMEPRAZOLE 20MG CAPSULES] 30 capsule 5     Sig: TAKE 1 CAPSULE BY MOUTH EVERY MORNING BEFORE BREAKFAST        Last Refilled: 6/25/2024    Last Office Visit: 10/9/2024    Next Office Visit: Due 7/2025    Last Labs: none

## 2025-03-14 ENCOUNTER — CLINICAL DOCUMENTATION (OUTPATIENT)
Facility: HOSPITAL | Age: 35
End: 2025-03-14

## 2025-03-15 DIAGNOSIS — F41.9 ANXIETY: ICD-10-CM

## 2025-03-15 NOTE — TELEPHONE ENCOUNTER
This patient contacted the office for the following prescriptions to be refilled:    Medication requested :   Requested Prescriptions     Pending Prescriptions Disp Refills    LORazepam (ATIVAN) 0.5 MG tablet [Pharmacy Med Name: LORAZEPAM 0.5MG TABLETS] 60 tablet 0     Sig: TAKE 1 TABLET BY MOUTH EVERY 6 HOURS AS NEEDED FOR ANXIETY. MAX DAILY AMOUNT: 2 MG        Last Refilled: 10/9/2024    Last Office Visit: 10/9/2024    Next Office Visit: Due 7/2024    Last Labs: none

## 2025-03-17 RX ORDER — LORAZEPAM 0.5 MG/1
TABLET ORAL
Qty: 60 TABLET | Refills: 0 | OUTPATIENT
Start: 2025-03-17 | End: 2025-04-16

## 2025-04-12 ENCOUNTER — PATIENT MESSAGE (OUTPATIENT)
Facility: CLINIC | Age: 35
End: 2025-04-12

## 2025-04-12 DIAGNOSIS — K21.9 GASTRO-ESOPHAGEAL REFLUX DISEASE WITHOUT ESOPHAGITIS: ICD-10-CM

## 2025-04-14 RX ORDER — OMEPRAZOLE 20 MG/1
20 CAPSULE, DELAYED RELEASE ORAL
Qty: 30 CAPSULE | Refills: 2 | Status: SHIPPED | OUTPATIENT
Start: 2025-04-14 | End: 2025-04-16 | Stop reason: SDUPTHER

## 2025-04-14 NOTE — TELEPHONE ENCOUNTER
This patient contacted the office for the following prescriptions to be refilled:    Medication requested :   Requested Prescriptions     Pending Prescriptions Disp Refills    omeprazole (PRILOSEC) 20 MG delayed release capsule [Pharmacy Med Name: OMEPRAZOLE 20MG CAPSULES] 30 capsule 2     Sig: TAKE 1 CAPSULE BY MOUTH EVERY MORNING BEFORE BREAKFAST        Last Refilled: 1/14/2025    Last Office Visit: 10/9/2024    Next Office Visit: Visit date not found    Last Labs: none

## 2025-04-16 ENCOUNTER — OFFICE VISIT (OUTPATIENT)
Facility: CLINIC | Age: 35
End: 2025-04-16
Payer: COMMERCIAL

## 2025-04-16 VITALS
HEIGHT: 62 IN | TEMPERATURE: 98.3 F | DIASTOLIC BLOOD PRESSURE: 76 MMHG | SYSTOLIC BLOOD PRESSURE: 136 MMHG | HEART RATE: 84 BPM | WEIGHT: 190 LBS | RESPIRATION RATE: 18 BRPM | OXYGEN SATURATION: 99 % | BODY MASS INDEX: 34.96 KG/M2

## 2025-04-16 DIAGNOSIS — K21.9 GASTRO-ESOPHAGEAL REFLUX DISEASE WITHOUT ESOPHAGITIS: ICD-10-CM

## 2025-04-16 DIAGNOSIS — F41.9 ANXIETY: Primary | ICD-10-CM

## 2025-04-16 DIAGNOSIS — R23.2 HOT FLASHES: ICD-10-CM

## 2025-04-16 DIAGNOSIS — Z85.43 HISTORY OF OVARIAN CANCER: ICD-10-CM

## 2025-04-16 DIAGNOSIS — J45.909 ASTHMA, UNSPECIFIED ASTHMA SEVERITY, UNSPECIFIED WHETHER COMPLICATED, UNSPECIFIED WHETHER PERSISTENT: ICD-10-CM

## 2025-04-16 DIAGNOSIS — Z98.84 H/O GASTRIC BYPASS: ICD-10-CM

## 2025-04-16 PROCEDURE — G8427 DOCREV CUR MEDS BY ELIG CLIN: HCPCS | Performed by: FAMILY MEDICINE

## 2025-04-16 PROCEDURE — 99214 OFFICE O/P EST MOD 30 MIN: CPT | Performed by: FAMILY MEDICINE

## 2025-04-16 PROCEDURE — 1036F TOBACCO NON-USER: CPT | Performed by: FAMILY MEDICINE

## 2025-04-16 PROCEDURE — G8417 CALC BMI ABV UP PARAM F/U: HCPCS | Performed by: FAMILY MEDICINE

## 2025-04-16 RX ORDER — OMEPRAZOLE 20 MG/1
20 CAPSULE, DELAYED RELEASE ORAL
Qty: 30 CAPSULE | Refills: 1 | Status: SHIPPED | OUTPATIENT
Start: 2025-04-16

## 2025-04-16 RX ORDER — LORAZEPAM 1 MG/1
1 TABLET ORAL EVERY 6 HOURS PRN
COMMUNITY
End: 2025-04-16 | Stop reason: SDUPTHER

## 2025-04-16 RX ORDER — VENLAFAXINE HYDROCHLORIDE 150 MG/1
150 CAPSULE, EXTENDED RELEASE ORAL DAILY
Qty: 90 CAPSULE | Refills: 0 | Status: SHIPPED | OUTPATIENT
Start: 2025-04-16

## 2025-04-16 RX ORDER — LORAZEPAM 1 MG/1
1 TABLET ORAL DAILY PRN
Qty: 6 TABLET | Refills: 0 | Status: SHIPPED | OUTPATIENT
Start: 2025-04-16 | End: 2025-04-22

## 2025-04-16 RX ORDER — MONTELUKAST SODIUM 10 MG/1
10 TABLET ORAL DAILY
Qty: 90 TABLET | Refills: 0 | Status: SHIPPED | OUTPATIENT
Start: 2025-04-16

## 2025-04-16 SDOH — ECONOMIC STABILITY: FOOD INSECURITY: WITHIN THE PAST 12 MONTHS, YOU WORRIED THAT YOUR FOOD WOULD RUN OUT BEFORE YOU GOT MONEY TO BUY MORE.: PATIENT DECLINED

## 2025-04-16 SDOH — ECONOMIC STABILITY: FOOD INSECURITY: WITHIN THE PAST 12 MONTHS, THE FOOD YOU BOUGHT JUST DIDN'T LAST AND YOU DIDN'T HAVE MONEY TO GET MORE.: PATIENT DECLINED

## 2025-04-16 ASSESSMENT — ANXIETY QUESTIONNAIRES
5. BEING SO RESTLESS THAT IT IS HARD TO SIT STILL: NOT AT ALL
4. TROUBLE RELAXING: SEVERAL DAYS
GAD7 TOTAL SCORE: 4
3. WORRYING TOO MUCH ABOUT DIFFERENT THINGS: SEVERAL DAYS
2. NOT BEING ABLE TO STOP OR CONTROL WORRYING: SEVERAL DAYS
6. BECOMING EASILY ANNOYED OR IRRITABLE: NOT AT ALL
IF YOU CHECKED OFF ANY PROBLEMS ON THIS QUESTIONNAIRE, HOW DIFFICULT HAVE THESE PROBLEMS MADE IT FOR YOU TO DO YOUR WORK, TAKE CARE OF THINGS AT HOME, OR GET ALONG WITH OTHER PEOPLE: SOMEWHAT DIFFICULT
7. FEELING AFRAID AS IF SOMETHING AWFUL MIGHT HAPPEN: NOT AT ALL
1. FEELING NERVOUS, ANXIOUS, OR ON EDGE: SEVERAL DAYS

## 2025-04-16 ASSESSMENT — PATIENT HEALTH QUESTIONNAIRE - PHQ9
SUM OF ALL RESPONSES TO PHQ QUESTIONS 1-9: 3
6. FEELING BAD ABOUT YOURSELF - OR THAT YOU ARE A FAILURE OR HAVE LET YOURSELF OR YOUR FAMILY DOWN: NOT AT ALL
SUM OF ALL RESPONSES TO PHQ QUESTIONS 1-9: 3
SUM OF ALL RESPONSES TO PHQ QUESTIONS 1-9: 3
3. TROUBLE FALLING OR STAYING ASLEEP: NOT AT ALL
10. IF YOU CHECKED OFF ANY PROBLEMS, HOW DIFFICULT HAVE THESE PROBLEMS MADE IT FOR YOU TO DO YOUR WORK, TAKE CARE OF THINGS AT HOME, OR GET ALONG WITH OTHER PEOPLE: SOMEWHAT DIFFICULT
7. TROUBLE CONCENTRATING ON THINGS, SUCH AS READING THE NEWSPAPER OR WATCHING TELEVISION: SEVERAL DAYS
1. LITTLE INTEREST OR PLEASURE IN DOING THINGS: NOT AT ALL
2. FEELING DOWN, DEPRESSED OR HOPELESS: SEVERAL DAYS
SUM OF ALL RESPONSES TO PHQ QUESTIONS 1-9: 3
9. THOUGHTS THAT YOU WOULD BE BETTER OFF DEAD, OR OF HURTING YOURSELF: NOT AT ALL
4. FEELING TIRED OR HAVING LITTLE ENERGY: SEVERAL DAYS
8. MOVING OR SPEAKING SO SLOWLY THAT OTHER PEOPLE COULD HAVE NOTICED. OR THE OPPOSITE, BEING SO FIGETY OR RESTLESS THAT YOU HAVE BEEN MOVING AROUND A LOT MORE THAN USUAL: NOT AT ALL
5. POOR APPETITE OR OVEREATING: NOT AT ALL

## 2025-04-16 NOTE — PROGRESS NOTES
Chief Complaint   Patient presents with    Hypertension    Anxiety      \"Have you been to the ER, urgent care clinic since your last visit?  Hospitalized since your last visit?\"    NO    “Have you seen or consulted any other health care providers outside our system since your last visit?”    NO      “Have you had a diabetic eye exam?”    NO     No diabetic eye exam on file

## 2025-04-16 NOTE — PATIENT INSTRUCTIONS
Patient Education        A Healthy Lifestyle: Care Instructions  A healthy lifestyle can help you feel good, have more energy, and stay at a weight that's healthy for you. You can share a healthy lifestyle with your friends and family. And you can do it on your own.    Eat meals with your friends or family. You could try cooking together.   Plan activities with other people. Go for a walk with a friend, try a free online fitness class, or join a sports league.     Eat a variety of healthy foods. These include fruits, vegetables, whole grains, low-fat dairy, and lean protein.   Choose healthy portions of food. You can use the Nutrition Facts label on food packages as a guide.     Eat more fruits and vegetables. You could add vegetables to sandwiches or add fruit to cereal.   Drink water when you are thirsty. Limit soda, juice, and sports drinks.     Try to exercise most days. Aim for at least 2½ hours of exercise each week.   Keep moving. Work in the garden or take your dog on a walk. Use the stairs instead of the elevator.     If you use tobacco or nicotine, try to quit. Ask your doctor about programs and medicines to help you quit.   Limit alcohol. Men should have no more than 2 drinks a day. Women should have no more than 1. For some people, no alcohol is the best choice.   Follow-up care is a key part of your treatment and safety. Be sure to make and go to all appointments, and call your doctor if you are having problems. It's also a good idea to know your test results and keep a list of the medicines you take.  Where can you learn more?  Go to https://www.[facility].net/patientEd and enter U807 to learn more about \"A Healthy Lifestyle: Care Instructions.\"  Current as of: April 30, 2024  Content Version: 14.4  © 6867-5892 [facility] [employer].   Care instructions adapted under license by [employer]. If you have questions about a medical condition or this instruction, always ask your healthcare professional.

## 2025-04-16 NOTE — PROGRESS NOTES
Rosa Houston (:  1990) is a 34 y.o. female, Established patient, here for evaluation of the following chief complaint(s):  Hypertension and Anxiety    Dr. Rubio patient.   Over due for follow up .  Need medication refill      Assessment & Plan  1. Medication Management.  - Requires refills for Singulair, Effexor, lorazepam 1 mg, and omeprazole.  - Prescription for 6 tablets of lorazepam 1 mg provided due to usage approximately three times a month.  - Other medications refilled and sent to [facility] pharmacy.  - Prescription Drug Monitoring Program not discussed.    2. Anxiety.  - Uses Effexor for anxiety and hot flashes.  - Does not see a counselor or psychiatrist currently.  - Advised to continue with Effexor as it helps manage symptoms.  - No panic attacks reported.    3. Asthma.  - Asthma has been stable.  - No recent exacerbations or symptoms reported.  - Physical exam: lungs clear.  - Continue current medication regimen, including Singulair.    4. Hypertension.  - Blood pressure and heart rate well-regulated.  - Effective management of hypertension noted.  - Continue current medication regimen, including olmesartan.  - No chest pain, trouble breathing, or palpitations reported.    5. Gastroesophageal Reflux Disease (GERD).  - On omeprazole for acid reflux.  - No stomach pain, constipation, or diarrhea reported.  - Continue omeprazole as prescribed.  - Physical exam: abdomen nontender.    6. Diabetes Mellitus.  - History of diabetes, possibly improved post-bariatric surgery.  - Last A1c done in 10/2024.  - Advised to schedule an appointment with primary care physician for routine care and necessary lab tests.  - No headache, dizziness, nausea, vomiting reported.    7. Preventive Care.  - Overdue for preventive care.  - Advised to make an appointment with primary care physician in a couple of months.  - Ensure all preventive measures are up to date.  - Routine care discussion included.    8.

## 2025-05-29 ENCOUNTER — HOSPITAL ENCOUNTER (EMERGENCY)
Age: 35
Discharge: HOME | End: 2025-05-29
Payer: COMMERCIAL

## 2025-05-29 ENCOUNTER — APPOINTMENT (OUTPATIENT)
Age: 35
End: 2025-05-29
Payer: COMMERCIAL

## 2025-05-29 VITALS
HEIGHT: 62 IN | DIASTOLIC BLOOD PRESSURE: 96 MMHG | BODY MASS INDEX: 34.23 KG/M2 | SYSTOLIC BLOOD PRESSURE: 150 MMHG | RESPIRATION RATE: 16 BRPM | WEIGHT: 186 LBS | TEMPERATURE: 99.1 F | HEART RATE: 87 BPM | OXYGEN SATURATION: 98 %

## 2025-05-29 DIAGNOSIS — K82.8 GALLBLADDER SLUDGE: ICD-10-CM

## 2025-05-29 DIAGNOSIS — K52.9 ENTERITIS: Primary | ICD-10-CM

## 2025-05-29 LAB
ALBUMIN SERPL-MCNC: 4 G/DL (ref 3.4–5)
ALBUMIN/GLOB SERPL: 1 (ref 1–1.9)
ALP SERPL-CCNC: 58 U/L (ref 45–117)
ALT SERPL-CCNC: 22 U/L (ref 10–35)
ANION GAP SERPL CALC-SCNC: 16 MMOL/L (ref 7–16)
APPEARANCE UR: ABNORMAL
AST SERPL-CCNC: 34 U/L (ref 10–38)
BACTERIA URNS QL MICRO: NEGATIVE /HPF
BASOPHILS # BLD: 0.02 K/UL (ref 0–0.1)
BASOPHILS NFR BLD: 0.4 % (ref 0–2)
BILIRUB SERPL-MCNC: 0.4 MG/DL (ref 0.2–1)
BILIRUB UR QL: ABNORMAL
BUN SERPL-MCNC: 14 MG/DL (ref 6–23)
BUN/CREAT SERPL: 13
CALCIUM SERPL-MCNC: 9.5 MG/DL (ref 8.5–10.1)
CHLORIDE SERPL-SCNC: 99 MMOL/L (ref 98–107)
CO2 SERPL-SCNC: 22 MMOL/L (ref 21–32)
COLOR UR: ABNORMAL
CREAT SERPL-MCNC: 1.1 MG/DL (ref 0.6–1.3)
DIFFERENTIAL METHOD BLD: ABNORMAL
EOSINOPHIL # BLD: 0.02 K/UL (ref 0–0.4)
EOSINOPHIL NFR BLD: 0.4 % (ref 0–5)
EPITH CASTS URNS QL MICRO: NORMAL /LPF (ref 0–5)
ERYTHROCYTE [DISTWIDTH] IN BLOOD BY AUTOMATED COUNT: 12.2 % (ref 11.6–14.5)
GLOBULIN SER CALC-MCNC: 4.2 G/DL (ref 2.3–3.5)
GLUCOSE SERPL-MCNC: 104 MG/DL (ref 74–108)
GLUCOSE UR STRIP.AUTO-MCNC: NEGATIVE MG/DL
HCG SERPL QL: NEGATIVE
HCT VFR BLD AUTO: 43.6 % (ref 35–45)
HGB BLD-MCNC: 15.2 G/DL (ref 12–16)
HGB UR QL STRIP: ABNORMAL
IMM GRANULOCYTES # BLD AUTO: 0.01 K/UL (ref 0–0.04)
IMM GRANULOCYTES NFR BLD AUTO: 0.2 % (ref 0–0.5)
KETONES UR QL STRIP.AUTO: ABNORMAL MG/DL
LEUKOCYTE ESTERASE UR QL STRIP.AUTO: NEGATIVE
LIPASE SERPL-CCNC: 45 U/L (ref 13–75)
LYMPHOCYTES # BLD: 0.82 K/UL (ref 0.9–3.6)
LYMPHOCYTES NFR BLD: 16.7 % (ref 21–52)
MAGNESIUM SERPL-MCNC: 1.9 MG/DL (ref 1.6–2.6)
MCH RBC QN AUTO: 30.2 PG (ref 24–34)
MCHC RBC AUTO-ENTMCNC: 34.9 G/DL (ref 31–37)
MCV RBC AUTO: 86.5 FL (ref 78–100)
MONOCYTES # BLD: 0.52 K/UL (ref 0.05–1.2)
MONOCYTES NFR BLD: 10.6 % (ref 3–10)
NEUTS SEG # BLD: 3.53 K/UL (ref 1.8–8)
NEUTS SEG NFR BLD: 71.7 % (ref 40–73)
NITRITE UR QL STRIP.AUTO: NEGATIVE
NRBC # BLD: 0 K/UL (ref 0–0.01)
NRBC BLD-RTO: 0 PER 100 WBC
PH UR STRIP: 5 (ref 5–8)
PLATELET # BLD AUTO: 248 K/UL (ref 135–420)
PMV BLD AUTO: 10.6 FL (ref 9.2–11.8)
POTASSIUM SERPL-SCNC: 4.3 MMOL/L (ref 3.5–5.5)
PROT SERPL-MCNC: 8.2 G/DL (ref 6.4–8.2)
PROT UR STRIP-MCNC: 30 MG/DL
RBC # BLD AUTO: 5.04 M/UL (ref 4.2–5.3)
RBC #/AREA URNS HPF: NORMAL /HPF (ref 0–5)
SODIUM SERPL-SCNC: 137 MMOL/L (ref 136–145)
SP GR UR REFRACTOMETRY: >1.03 (ref 1–1.03)
TROPONIN T SERPL HS-MCNC: <6 NG/L (ref 0–14)
UROBILINOGEN UR QL STRIP.AUTO: 1 EU/DL (ref 0.2–1)
WBC # BLD AUTO: 4.9 K/UL (ref 4.6–13.2)
WBC URNS QL MICRO: NORMAL /HPF (ref 0–4)

## 2025-05-29 PROCEDURE — 81003 URINALYSIS AUTO W/O SCOPE: CPT

## 2025-05-29 PROCEDURE — 84703 CHORIONIC GONADOTROPIN ASSAY: CPT

## 2025-05-29 PROCEDURE — 83735 ASSAY OF MAGNESIUM: CPT

## 2025-05-29 PROCEDURE — 85025 COMPLETE CBC W/AUTO DIFF WBC: CPT

## 2025-05-29 PROCEDURE — 81001 URINALYSIS AUTO W/SCOPE: CPT

## 2025-05-29 PROCEDURE — 2580000003 HC RX 258: Performed by: PHYSICIAN ASSISTANT

## 2025-05-29 PROCEDURE — 80053 COMPREHEN METABOLIC PANEL: CPT

## 2025-05-29 PROCEDURE — 83690 ASSAY OF LIPASE: CPT

## 2025-05-29 PROCEDURE — 84484 ASSAY OF TROPONIN QUANT: CPT

## 2025-05-29 PROCEDURE — 74177 CT ABD & PELVIS W/CONTRAST: CPT

## 2025-05-29 PROCEDURE — 99285 EMERGENCY DEPT VISIT HI MDM: CPT

## 2025-05-29 PROCEDURE — 6360000004 HC RX CONTRAST MEDICATION: Performed by: PHYSICIAN ASSISTANT

## 2025-05-29 RX ORDER — 0.9 % SODIUM CHLORIDE 0.9 %
1000 INTRAVENOUS SOLUTION INTRAVENOUS ONCE
Status: COMPLETED | OUTPATIENT
Start: 2025-05-29 | End: 2025-05-29

## 2025-05-29 RX ORDER — LOPERAMIDE HYDROCHLORIDE 1 MG/5ML
2 SOLUTION ORAL 4 TIMES DAILY PRN
Qty: 118 ML | Refills: 0 | Status: SHIPPED | OUTPATIENT
Start: 2025-05-29 | End: 2025-06-05

## 2025-05-29 RX ORDER — IOPAMIDOL 612 MG/ML
100 INJECTION, SOLUTION INTRAVASCULAR
Status: COMPLETED | OUTPATIENT
Start: 2025-05-29 | End: 2025-05-29

## 2025-05-29 RX ADMIN — IOPAMIDOL 100 ML: 612 INJECTION, SOLUTION INTRAVENOUS at 18:51

## 2025-05-29 RX ADMIN — SODIUM CHLORIDE 1000 ML: 0.9 INJECTION, SOLUTION INTRAVENOUS at 17:45

## 2025-05-29 ASSESSMENT — LIFESTYLE VARIABLES
HOW OFTEN DO YOU HAVE A DRINK CONTAINING ALCOHOL: NEVER
HOW MANY STANDARD DRINKS CONTAINING ALCOHOL DO YOU HAVE ON A TYPICAL DAY: PATIENT DOES NOT DRINK

## 2025-05-29 ASSESSMENT — PAIN - FUNCTIONAL ASSESSMENT: PAIN_FUNCTIONAL_ASSESSMENT: NONE - DENIES PAIN

## 2025-05-29 NOTE — ED TRIAGE NOTES
Pt states that she has had N/V/D since yesterday with some lightheadedness. She had a gastric bypass a year ago. Denies pain or any other complaints.

## 2025-05-29 NOTE — ED PROVIDER NOTES
EMERGENCY DEPARTMENT HISTORY AND PHYSICAL EXAM      Date: 5/29/2025  Patient Name: Rosa Houston    History of Presenting Illness     Chief Complaint   Patient presents with    Nausea    Vomiting    Diarrhea    Lightheadedness       History (Context): Rosa Houston is a 34 y.o. female with significant PMHx for ovarian cancer, asthma, anxiety, GERD, depression, DM presents ambulatory to the ED today.  Patient reports about 3 bouts of nonbloody vomiting that began yesterday with multiple bouts of nonbloody loose stool.  Patient with intermittent abdominal cramping.  Patient also reports intermittent lightheadedness.  Denies CP, SOB, dizziness.  Patient ports history of gastric bypass about 1 year ago.  Patient reports taking Zofran with moderate relief of symptoms.  Denies CP, SOB      PCP: Emily Rubio MD    No current facility-administered medications for this encounter.     Current Outpatient Medications   Medication Sig Dispense Refill    loperamide (ANTI-DIARRHEAL) 1 MG/5ML solution Take 10 mLs by mouth 4 times daily as needed for Diarrhea 118 mL 0    venlafaxine (EFFEXOR XR) 150 MG extended release capsule Take 1 capsule by mouth daily 90 capsule 0    montelukast (SINGULAIR) 10 MG tablet Take 1 tablet by mouth daily 90 tablet 0    omeprazole (PRILOSEC) 20 MG delayed release capsule Take 1 capsule by mouth every morning (before breakfast) 30 capsule 1    Ursodiol (RELTONE) 200 MG CAPS Take 200 mg by mouth See Admin Instructions Take 1 capsule in the morning and take 2 capsules in the evening. 90 capsule 5    hydrOXYzine HCl (ATARAX) 25 MG tablet Take 1 tablet by mouth daily as needed (sleep) 90 tablet 3    betamethasone dipropionate 0.05 % lotion Apply topically 2 times daily Apply topically 2 times daily. 60 mL 3    betamethasone dipropionate 0.05 % ointment Apply topically 2 times daily Apply topically 2 times daily. 50 g 3    betamethasone dipropionate 0.05 % cream Apply topically 2 times daily. 45

## 2025-05-30 LAB
EKG ATRIAL RATE: 85 BPM
EKG DIAGNOSIS: NORMAL
EKG P AXIS: 57 DEGREES
EKG P-R INTERVAL: 140 MS
EKG Q-T INTERVAL: 366 MS
EKG QRS DURATION: 86 MS
EKG QTC CALCULATION (BAZETT): 435 MS
EKG R AXIS: 43 DEGREES
EKG T AXIS: 19 DEGREES
EKG VENTRICULAR RATE: 85 BPM

## 2025-07-28 ENCOUNTER — OFFICE VISIT (OUTPATIENT)
Facility: CLINIC | Age: 35
End: 2025-07-28
Payer: COMMERCIAL

## 2025-07-28 VITALS
BODY MASS INDEX: 36.07 KG/M2 | DIASTOLIC BLOOD PRESSURE: 74 MMHG | WEIGHT: 196 LBS | RESPIRATION RATE: 16 BRPM | TEMPERATURE: 98.2 F | HEIGHT: 62 IN | HEART RATE: 74 BPM | SYSTOLIC BLOOD PRESSURE: 128 MMHG | OXYGEN SATURATION: 99 %

## 2025-07-28 DIAGNOSIS — E66.812 CLASS 2 SEVERE OBESITY DUE TO EXCESS CALORIES WITH SERIOUS COMORBIDITY AND BODY MASS INDEX (BMI) OF 35.0 TO 35.9 IN ADULT (HCC): Primary | ICD-10-CM

## 2025-07-28 DIAGNOSIS — Z90.722 STATUS POST BILATERAL OOPHORECTOMY: ICD-10-CM

## 2025-07-28 DIAGNOSIS — E11.65 TYPE 2 DIABETES MELLITUS WITH HYPERGLYCEMIA, WITHOUT LONG-TERM CURRENT USE OF INSULIN (HCC): ICD-10-CM

## 2025-07-28 DIAGNOSIS — K21.9 GASTRO-ESOPHAGEAL REFLUX DISEASE WITHOUT ESOPHAGITIS: ICD-10-CM

## 2025-07-28 DIAGNOSIS — Z11.59 ENCOUNTER FOR HEPATITIS C SCREENING TEST FOR LOW RISK PATIENT: ICD-10-CM

## 2025-07-28 DIAGNOSIS — G47.00 INSOMNIA, UNSPECIFIED TYPE: ICD-10-CM

## 2025-07-28 DIAGNOSIS — Z11.4 SCREENING FOR HUMAN IMMUNODEFICIENCY VIRUS WITHOUT PRESENCE OF RISK FACTORS: ICD-10-CM

## 2025-07-28 DIAGNOSIS — E66.01 CLASS 2 SEVERE OBESITY DUE TO EXCESS CALORIES WITH SERIOUS COMORBIDITY AND BODY MASS INDEX (BMI) OF 35.0 TO 35.9 IN ADULT (HCC): Primary | ICD-10-CM

## 2025-07-28 DIAGNOSIS — F41.1 GAD (GENERALIZED ANXIETY DISORDER): ICD-10-CM

## 2025-07-28 DIAGNOSIS — J45.909 ASTHMA, UNSPECIFIED ASTHMA SEVERITY, UNSPECIFIED WHETHER COMPLICATED, UNSPECIFIED WHETHER PERSISTENT: ICD-10-CM

## 2025-07-28 PROCEDURE — G8417 CALC BMI ABV UP PARAM F/U: HCPCS | Performed by: FAMILY MEDICINE

## 2025-07-28 PROCEDURE — 2022F DILAT RTA XM EVC RTNOPTHY: CPT | Performed by: FAMILY MEDICINE

## 2025-07-28 PROCEDURE — G8427 DOCREV CUR MEDS BY ELIG CLIN: HCPCS | Performed by: FAMILY MEDICINE

## 2025-07-28 PROCEDURE — 1036F TOBACCO NON-USER: CPT | Performed by: FAMILY MEDICINE

## 2025-07-28 PROCEDURE — 99214 OFFICE O/P EST MOD 30 MIN: CPT | Performed by: FAMILY MEDICINE

## 2025-07-28 PROCEDURE — 3046F HEMOGLOBIN A1C LEVEL >9.0%: CPT | Performed by: FAMILY MEDICINE

## 2025-07-28 RX ORDER — LORAZEPAM 1 MG/1
1 TABLET ORAL DAILY PRN
COMMUNITY
End: 2025-07-28 | Stop reason: SDUPTHER

## 2025-07-28 RX ORDER — VENLAFAXINE HYDROCHLORIDE 150 MG/1
150 CAPSULE, EXTENDED RELEASE ORAL DAILY
Qty: 90 CAPSULE | Refills: 3 | Status: SHIPPED | OUTPATIENT
Start: 2025-07-28

## 2025-07-28 RX ORDER — NORETHINDRONE ACETATE AND ETHINYL ESTRADIOL AND FERROUS FUMARATE 1.5-30(21)
1 KIT ORAL DAILY
COMMUNITY
Start: 2025-07-05 | End: 2025-07-28 | Stop reason: SDUPTHER

## 2025-07-28 RX ORDER — MONTELUKAST SODIUM 10 MG/1
10 TABLET ORAL DAILY
Qty: 90 TABLET | Refills: 3 | Status: SHIPPED | OUTPATIENT
Start: 2025-07-28

## 2025-07-28 RX ORDER — OMEPRAZOLE 20 MG/1
20 CAPSULE, DELAYED RELEASE ORAL
Qty: 90 CAPSULE | Refills: 3 | Status: SHIPPED | OUTPATIENT
Start: 2025-07-28

## 2025-07-28 RX ORDER — HYDROXYZINE HYDROCHLORIDE 25 MG/1
25 TABLET, FILM COATED ORAL DAILY PRN
Qty: 90 TABLET | Refills: 3 | Status: SHIPPED | OUTPATIENT
Start: 2025-07-28

## 2025-07-28 RX ORDER — LORAZEPAM 1 MG/1
1 TABLET ORAL DAILY PRN
Qty: 30 TABLET | Refills: 0 | Status: SHIPPED | OUTPATIENT
Start: 2025-07-28 | End: 2025-10-26

## 2025-07-28 RX ORDER — NORETHINDRONE ACETATE AND ETHINYL ESTRADIOL AND FERROUS FUMARATE 1.5-30(21)
KIT ORAL
Qty: 3 PACKET | Refills: 5 | Status: SHIPPED | OUTPATIENT
Start: 2025-07-28

## 2025-07-28 ASSESSMENT — ENCOUNTER SYMPTOMS
SHORTNESS OF BREATH: 0
ABDOMINAL PAIN: 0
COUGH: 0
EYE PAIN: 0
CONSTIPATION: 0
DIARRHEA: 0

## 2025-07-28 ASSESSMENT — PATIENT HEALTH QUESTIONNAIRE - PHQ9
9. THOUGHTS THAT YOU WOULD BE BETTER OFF DEAD, OR OF HURTING YOURSELF: NOT AT ALL
6. FEELING BAD ABOUT YOURSELF - OR THAT YOU ARE A FAILURE OR HAVE LET YOURSELF OR YOUR FAMILY DOWN: NOT AT ALL
7. TROUBLE CONCENTRATING ON THINGS, SUCH AS READING THE NEWSPAPER OR WATCHING TELEVISION: NOT AT ALL
3. TROUBLE FALLING OR STAYING ASLEEP: NOT AT ALL
SUM OF ALL RESPONSES TO PHQ QUESTIONS 1-9: 0
2. FEELING DOWN, DEPRESSED OR HOPELESS: NOT AT ALL
4. FEELING TIRED OR HAVING LITTLE ENERGY: NOT AT ALL
5. POOR APPETITE OR OVEREATING: NOT AT ALL
8. MOVING OR SPEAKING SO SLOWLY THAT OTHER PEOPLE COULD HAVE NOTICED. OR THE OPPOSITE, BEING SO FIGETY OR RESTLESS THAT YOU HAVE BEEN MOVING AROUND A LOT MORE THAN USUAL: NOT AT ALL
10. IF YOU CHECKED OFF ANY PROBLEMS, HOW DIFFICULT HAVE THESE PROBLEMS MADE IT FOR YOU TO DO YOUR WORK, TAKE CARE OF THINGS AT HOME, OR GET ALONG WITH OTHER PEOPLE: NOT DIFFICULT AT ALL

## 2025-07-28 NOTE — PROGRESS NOTES
Have you been to the ER, urgent care clinic since your last visit?  Hospitalized since your last visit?   YES - When: approximately 2 months ago.  Where and Why: [facility] Emergency Department-N/V/D.    Have you seen or consulted any other health care providers outside our system since your last visit?   NO         
      Past Surgical History:   Procedure Laterality Date    ADENOIDECTOMY Bilateral 10/2005    OVARY REMOVAL      lap left oophorectomy, later open right oophorectomy via low midline laparotomy (for bilateral germ cell ovarian cancer)    POLYPECTOMY  1993    rectal    SLEEVE GASTRECTOMY N/A 6/20/2024    Robotic assisted laparoscopic sleeve gastrectomy, liver wedge biopsy and hiatal hernia repair. performed by Merlin Mora MD at [facility] MAIN OR    UPPER GASTROINTESTINAL ENDOSCOPY N/A 1/15/2024    EGD ESOPHAGOGASTRODUODENOSCOPY w/ bxs performed by Merlin Mora MD at [facility] ENDOSCOPY       Social History     Socioeconomic History    Marital status:      Spouse name: Not on file    Number of children: Not on file    Years of education: Not on file    Highest education level: Not on file   Occupational History    Not on file   Tobacco Use    Smoking status: Never     Passive exposure: Never    Smokeless tobacco: Never   Vaping Use    Vaping status: Never Used   Substance and Sexual Activity    Alcohol use: Yes     Alcohol/week: 1.0 standard drink of alcohol     Types: 1 Glasses of wine per week     Comment: socially    Drug use: Yes     Types: Marijuana (Weed)     Comment: topical and edible THC use for anxiety    Sexual activity: Yes     Partners: Male     Birth control/protection: Pill   Other Topics Concern    Not on file   Social History Narrative    Not on file     Social Drivers of Health     Financial Resource Strain: Low Risk  (6/25/2024)    Overall Financial Resource Strain (CARDIA)     Difficulty of Paying Living Expenses: Not hard at all   Food Insecurity: Patient Declined (4/16/2025)    Hunger Vital Sign     Worried About Running Out of Food in the Last Year: Patient declined     Ran Out of Food in the Last Year: Patient declined   Transportation Needs: Patient Declined (4/16/2025)    PRAPARE - Transportation     Lack of Transportation (Medical): Patient declined     Lack of Transportation

## 2025-07-28 NOTE — PATIENT INSTRUCTIONS
Patient Education        A Healthy Lifestyle: Care Instructions  A healthy lifestyle can help you feel good, have more energy, and stay at a weight that's healthy for you. You can share a healthy lifestyle with your friends and family. And you can do it on your own.    Eat meals with your friends or family. You could try cooking together.   Plan activities with other people. Go for a walk with a friend, try a free online fitness class, or join a sports league.     Eat a variety of healthy foods. These include fruits, vegetables, whole grains, low-fat dairy, and lean protein.   Choose healthy portions of food. You can use the Nutrition Facts label on food packages as a guide.     Eat more fruits and vegetables. You could add vegetables to sandwiches or add fruit to cereal.   Drink water when you are thirsty. Limit soda, juice, and sports drinks.     Try to exercise most days. Aim for at least 2½ hours of exercise each week.   Keep moving. Work in the garden or take your dog on a walk. Use the stairs instead of the elevator.     If you use tobacco or nicotine, try to quit. Ask your doctor about programs and medicines to help you quit.   Limit alcohol. Men should have no more than 2 drinks a day. Women should have no more than 1. For some people, no alcohol is the best choice.   Follow-up care is a key part of your treatment and safety. Be sure to make and go to all appointments, and call your doctor if you are having problems. It's also a good idea to know your test results and keep a list of the medicines you take.  Where can you learn more?  Go to https://www.[employer].net/patientEd and enter U807 to learn more about \"A Healthy Lifestyle: Care Instructions.\"  Current as of: April 30, 2024  Content Version: 14.5  © 4732-0052 [employer] [employer].   Care instructions adapted under license by "[employer]. If you have questions about a medical condition or this instruction, always ask your healthcare professional.

## 2025-08-25 DIAGNOSIS — E66.812 CLASS 2 SEVERE OBESITY DUE TO EXCESS CALORIES WITH SERIOUS COMORBIDITY AND BODY MASS INDEX (BMI) OF 35.0 TO 35.9 IN ADULT (HCC): ICD-10-CM

## 2025-08-25 DIAGNOSIS — E66.01 CLASS 2 SEVERE OBESITY DUE TO EXCESS CALORIES WITH SERIOUS COMORBIDITY AND BODY MASS INDEX (BMI) OF 35.0 TO 35.9 IN ADULT (HCC): ICD-10-CM

## 2025-08-25 DIAGNOSIS — E11.65 TYPE 2 DIABETES MELLITUS WITH HYPERGLYCEMIA, WITHOUT LONG-TERM CURRENT USE OF INSULIN (HCC): ICD-10-CM

## 2025-08-27 ENCOUNTER — TELEMEDICINE (OUTPATIENT)
Facility: CLINIC | Age: 35
End: 2025-08-27
Payer: COMMERCIAL

## 2025-08-27 DIAGNOSIS — E66.01 CLASS 2 SEVERE OBESITY DUE TO EXCESS CALORIES WITH SERIOUS COMORBIDITY AND BODY MASS INDEX (BMI) OF 35.0 TO 35.9 IN ADULT (HCC): ICD-10-CM

## 2025-08-27 DIAGNOSIS — B37.31 YEAST VAGINITIS: Primary | ICD-10-CM

## 2025-08-27 DIAGNOSIS — E11.65 TYPE 2 DIABETES MELLITUS WITH HYPERGLYCEMIA, WITHOUT LONG-TERM CURRENT USE OF INSULIN (HCC): ICD-10-CM

## 2025-08-27 DIAGNOSIS — E66.812 CLASS 2 SEVERE OBESITY DUE TO EXCESS CALORIES WITH SERIOUS COMORBIDITY AND BODY MASS INDEX (BMI) OF 35.0 TO 35.9 IN ADULT (HCC): ICD-10-CM

## 2025-08-27 PROCEDURE — 3046F HEMOGLOBIN A1C LEVEL >9.0%: CPT | Performed by: FAMILY MEDICINE

## 2025-08-27 PROCEDURE — G8427 DOCREV CUR MEDS BY ELIG CLIN: HCPCS | Performed by: FAMILY MEDICINE

## 2025-08-27 PROCEDURE — 99214 OFFICE O/P EST MOD 30 MIN: CPT | Performed by: FAMILY MEDICINE

## 2025-08-27 PROCEDURE — 2022F DILAT RTA XM EVC RTNOPTHY: CPT | Performed by: FAMILY MEDICINE

## 2025-08-27 PROCEDURE — G8417 CALC BMI ABV UP PARAM F/U: HCPCS | Performed by: FAMILY MEDICINE

## 2025-08-27 PROCEDURE — 1036F TOBACCO NON-USER: CPT | Performed by: FAMILY MEDICINE

## 2025-08-27 RX ORDER — FLUCONAZOLE 150 MG/1
150 TABLET ORAL ONCE
Qty: 1 TABLET | Refills: 0 | Status: SHIPPED | OUTPATIENT
Start: 2025-08-27 | End: 2025-08-27

## 2025-08-27 ASSESSMENT — ENCOUNTER SYMPTOMS
ABDOMINAL PAIN: 0
CONSTIPATION: 0
SHORTNESS OF BREATH: 0
EYE PAIN: 0
COUGH: 0
DIARRHEA: 0

## (undated) DEVICE — SUTURE ETHIBOND EXCEL SZ 2-0 L30IN NONABSORBABLE GRN L26MM SH X833H

## (undated) DEVICE — APPLICATOR MEDICATED 26 CC SOLUTION HI LT ORNG CHLORAPREP

## (undated) DEVICE — CANNULA NSL AD TBNG L14FT STD PVC O2 CRV CONN NONFLARED NSL

## (undated) DEVICE — INTENDED FOR TISSUE SEPARATION, AND OTHER PROCEDURES THAT REQUIRE A SHARP SURGICAL BLADE TO PUNCTURE OR CUT.: Brand: BARD-PARKER ® STAINLESS STEEL BLADES

## (undated) DEVICE — SOLUTION ANTIFOG VIS SYS CLEARIFY LAPSCP

## (undated) DEVICE — LIQUIBAND RAPID ADHESIVE 36/CS 0.8ML: Brand: MEDLINE

## (undated) DEVICE — ARM DRAPE

## (undated) DEVICE — DISPOSABLE LAPAROSCOPIC CLIP APPLIER WITH 20 CLIPS.: Brand: EPIX® UNIVERSAL CLIP APPLIER

## (undated) DEVICE — BLADELESS OBTURATOR: Brand: WECK VISTA

## (undated) DEVICE — Device

## (undated) DEVICE — DRAIN SURG PENROSE 0.25X12 IN CLOSED WND DRAINAGE PREM SIL

## (undated) DEVICE — SOLUTION IRRIG 1000ML 0.9% SOD CHL USP POUR PLAS BTL

## (undated) DEVICE — MEDI-VAC NON-CONDUCTIVE SUCTION TUBING: Brand: CARDINAL HEALTH

## (undated) DEVICE — KIT OR TURNOVER

## (undated) DEVICE — SYRINGE MED 50ML LUERSLIP TIP

## (undated) DEVICE — SUTURE VICRYL SZ 2-0 L27IN ABSRB VLT L36MM CT-1 1/2 CIR J339H

## (undated) DEVICE — SEALANT TISS 10 ML FIBRIN VISTASEAL

## (undated) DEVICE — BITE BLOCK ENDOSCP UNIV AD 6 TO 9.4 MM

## (undated) DEVICE — KIT,ANTI FOG,W/SPONGE & FLUID,SOFT PACK: Brand: MEDLINE

## (undated) DEVICE — BASIN EMSIS 16OZ GRAPHITE PLAS KID SHP MOLD GRAD FOR ORAL

## (undated) DEVICE — DECANTER BAG 9": Brand: MEDLINE INDUSTRIES, INC.

## (undated) DEVICE — LINER SUCT CANSTR 3000CC PLAS SFT PRE ASSEMB W/OUT TBNG W/

## (undated) DEVICE — FORCEPS BX L240CM JAW DIA2.4MM ORNG L CAP W/ NDL DISP RAD

## (undated) DEVICE — STERILE POLYISOPRENE POWDER-FREE SURGICAL GLOVES: Brand: PROTEXIS

## (undated) DEVICE — GLOVE SURG SZ 65 CRM LTX FREE POLYISOPRENE POLYMER BEAD ANTI

## (undated) DEVICE — GAUZE,SPONGE,4"X4",16PLY,STRL,LF,10/TRAY: Brand: MEDLINE

## (undated) DEVICE — SYRINGE MED 25GA 3ML L5/8IN SUBQ PLAS W/ DETACH NDL SFTY

## (undated) DEVICE — SYRINGE MED 30ML STD CLR PLAS LUERLOCK TIP N CTRL DISP

## (undated) DEVICE — SOLUTION IRRIG 1000ML H2O STRL BLT

## (undated) DEVICE — TISSUE RETRIEVAL SYSTEM: Brand: INZII RETRIEVAL SYSTEM

## (undated) DEVICE — VISIGI 3D®  CALIBRATION SYSTEM  SIZE 40FR STD W/ BULB: Brand: BOEHRINGER® VISIGI 3D™ SLEEVE GASTRECTOMY CALIBRATION SYSTEM, SIZE 40FR W/BULB

## (undated) DEVICE — UNDERPAD INCONT W23XL36IN STD BLU POLYPR BK FLUF SFT

## (undated) DEVICE — ENDOSCOPY PUMP TUBING/ CAP SET: Brand: ERBE

## (undated) DEVICE — NEEDLE SPNL 20GA L3.5IN YEL HUB S STL REG WALL FIT STYL

## (undated) DEVICE — STAPLER 60 RELOAD BLUE: Brand: SUREFORM

## (undated) DEVICE — DRAPE TOWEL: Brand: CONVERTORS

## (undated) DEVICE — COLUMN DRAPE

## (undated) DEVICE — INSUFFLATION NEEDLE TO ESTABLISH PNEUMOPERITONEUM.: Brand: INSUFFLATION NEEDLE

## (undated) DEVICE — YANKAUER,SMOOTH HANDLE,HIGH CAPACITY: Brand: MEDLINE INDUSTRIES, INC.

## (undated) DEVICE — CATHETER SUCT TR FL TIP 14FR W/ O CTRL

## (undated) DEVICE — SEAL

## (undated) DEVICE — VESSEL SEALER EXTEND: Brand: ENDOWRIST

## (undated) DEVICE — KIT SUTURING DEVICE M-CLOSE

## (undated) DEVICE — APPLICATOR LAP 35 CM 2 RIGID VISTASEAL

## (undated) DEVICE — TAPE,CLOTH/SILK,CURAD,3"X10YD,LF,40/CS: Brand: CURAD

## (undated) DEVICE — TROCARS: Brand: KII® OPTICAL ACCESS SYSTEM

## (undated) DEVICE — DECANTER FLD 9IN ST BG FOR ASEP TRNSF OF FLD

## (undated) DEVICE — SYRINGE MED 10ML LUERLOCK TIP W/O SFTY DISP

## (undated) DEVICE — GOWN,PREVENTION PLUS,XLN/XL,ST,24/CS: Brand: MEDLINE

## (undated) DEVICE — SUTURE MONOCRYL SZ 4-0 L27IN ABSRB UD L24MM PS-1 3/8 CIR PRIM Y935H

## (undated) DEVICE — SHEARS LAP L45CM DIA5MM ULTRASONIC CRV TIP ADV HEMSTAS HARM

## (undated) DEVICE — AIRSEAL BIFURCATED SMOKE EVAC FILTERED TUBE SET: Brand: AIRSEAL

## (undated) DEVICE — BANDAGE,GAUZE,BULKEE II,4.5"X4.1YD,STRL: Brand: MEDLINE

## (undated) DEVICE — REDUCER: Brand: ENDOWRIST

## (undated) DEVICE — 3M™ STERI-DRAPE™ INSTRUMENT POUCH 1018L: Brand: STERI-DRAPE™

## (undated) DEVICE — STAPLER 60 RELOAD WHITE: Brand: SUREFORM

## (undated) DEVICE — STAPLER 60: Brand: SUREFORM

## (undated) DEVICE — SUTURE ETHIBOND EXCEL SZ 2-0 L36IN NONABSORBABLE GRN SH-1 X763H

## (undated) DEVICE — ELECTRODE PT RET AD L9FT HI MOIST COND ADH HYDRGEL CORDED